# Patient Record
Sex: MALE | Race: OTHER | NOT HISPANIC OR LATINO | URBAN - METROPOLITAN AREA
[De-identification: names, ages, dates, MRNs, and addresses within clinical notes are randomized per-mention and may not be internally consistent; named-entity substitution may affect disease eponyms.]

---

## 2017-03-16 ENCOUNTER — INPATIENT (INPATIENT)
Facility: HOSPITAL | Age: 82
LOS: 0 days | Discharge: HOME | DRG: 153 | End: 2017-03-17
Attending: INTERNAL MEDICINE | Admitting: INTERNAL MEDICINE
Payer: COMMERCIAL

## 2017-03-16 VITALS
SYSTOLIC BLOOD PRESSURE: 168 MMHG | TEMPERATURE: 100 F | RESPIRATION RATE: 19 BRPM | HEART RATE: 70 BPM | DIASTOLIC BLOOD PRESSURE: 50 MMHG

## 2017-03-16 DIAGNOSIS — R63.8 OTHER SYMPTOMS AND SIGNS CONCERNING FOOD AND FLUID INTAKE: ICD-10-CM

## 2017-03-16 DIAGNOSIS — J10.1 INFLUENZA DUE TO OTHER IDENTIFIED INFLUENZA VIRUS WITH OTHER RESPIRATORY MANIFESTATIONS: ICD-10-CM

## 2017-03-16 DIAGNOSIS — E87.1 HYPO-OSMOLALITY AND HYPONATREMIA: ICD-10-CM

## 2017-03-16 DIAGNOSIS — R55 SYNCOPE AND COLLAPSE: ICD-10-CM

## 2017-03-16 DIAGNOSIS — D50.9 IRON DEFICIENCY ANEMIA, UNSPECIFIED: ICD-10-CM

## 2017-03-16 DIAGNOSIS — J11.1 INFLUENZA DUE TO UNIDENTIFIED INFLUENZA VIRUS WITH OTHER RESPIRATORY MANIFESTATIONS: ICD-10-CM

## 2017-03-16 DIAGNOSIS — I25.10 ATHEROSCLEROTIC HEART DISEASE OF NATIVE CORONARY ARTERY WITHOUT ANGINA PECTORIS: ICD-10-CM

## 2017-03-16 DIAGNOSIS — N17.9 ACUTE KIDNEY FAILURE, UNSPECIFIED: ICD-10-CM

## 2017-03-16 DIAGNOSIS — Z29.9 ENCOUNTER FOR PROPHYLACTIC MEASURES, UNSPECIFIED: ICD-10-CM

## 2017-03-16 DIAGNOSIS — I10 ESSENTIAL (PRIMARY) HYPERTENSION: ICD-10-CM

## 2017-03-16 LAB
ALBUMIN SERPL ELPH-MCNC: 3.7 G/DL — SIGNIFICANT CHANGE UP (ref 3.3–5)
ALP SERPL-CCNC: 83 U/L — SIGNIFICANT CHANGE UP (ref 40–120)
ALT FLD-CCNC: 14 U/L RC — SIGNIFICANT CHANGE UP (ref 10–45)
ANION GAP SERPL CALC-SCNC: 14 MMOL/L — SIGNIFICANT CHANGE UP (ref 5–17)
AST SERPL-CCNC: 26 U/L — SIGNIFICANT CHANGE UP (ref 10–40)
BASE EXCESS BLDV CALC-SCNC: -0.1 MMOL/L — SIGNIFICANT CHANGE UP (ref -2–2)
BASOPHILS # BLD AUTO: 0 K/UL — SIGNIFICANT CHANGE UP (ref 0–0.2)
BASOPHILS NFR BLD AUTO: 0.3 % — SIGNIFICANT CHANGE UP (ref 0–2)
BILIRUB SERPL-MCNC: 0.6 MG/DL — SIGNIFICANT CHANGE UP (ref 0.2–1.2)
BUN SERPL-MCNC: 26 MG/DL — HIGH (ref 7–23)
CA-I SERPL-SCNC: 1.12 MMOL/L — SIGNIFICANT CHANGE UP (ref 1.12–1.3)
CALCIUM SERPL-MCNC: 8.5 MG/DL — SIGNIFICANT CHANGE UP (ref 8.4–10.5)
CHLORIDE BLDV-SCNC: 96 MMOL/L — SIGNIFICANT CHANGE UP (ref 96–108)
CHLORIDE SERPL-SCNC: 91 MMOL/L — LOW (ref 96–108)
CK MB BLD-MCNC: 1.4 % — SIGNIFICANT CHANGE UP (ref 0–3.5)
CK MB CFR SERPL CALC: 2.3 NG/ML — SIGNIFICANT CHANGE UP (ref 0–6.7)
CK SERPL-CCNC: 163 U/L — SIGNIFICANT CHANGE UP (ref 30–200)
CO2 BLDV-SCNC: 25 MMOL/L — SIGNIFICANT CHANGE UP (ref 22–30)
CO2 SERPL-SCNC: 21 MMOL/L — LOW (ref 22–31)
CREAT SERPL-MCNC: 1.45 MG/DL — HIGH (ref 0.5–1.3)
DACRYOCYTES BLD QL SMEAR: SLIGHT — SIGNIFICANT CHANGE UP
ELLIPTOCYTES BLD QL SMEAR: SLIGHT — SIGNIFICANT CHANGE UP
EOSINOPHIL # BLD AUTO: 0.1 K/UL — SIGNIFICANT CHANGE UP (ref 0–0.5)
EOSINOPHIL NFR BLD AUTO: 1.3 % — SIGNIFICANT CHANGE UP (ref 0–6)
FLUBV RNA SPEC QL NAA+PROBE: DETECTED
GAS PNL BLDV: 126 MMOL/L — LOW (ref 136–145)
GAS PNL BLDV: SIGNIFICANT CHANGE UP
GLUCOSE BLDV-MCNC: 131 MG/DL — HIGH (ref 70–99)
GLUCOSE SERPL-MCNC: 132 MG/DL — HIGH (ref 70–99)
HCO3 BLDV-SCNC: 24 MMOL/L — SIGNIFICANT CHANGE UP (ref 21–29)
HCT VFR BLD CALC: 28.4 % — LOW (ref 39–50)
HCT VFR BLDA CALC: 28 % — LOW (ref 39–50)
HGB BLD CALC-MCNC: 9.1 G/DL — LOW (ref 13–17)
HGB BLD-MCNC: 9.5 G/DL — LOW (ref 13–17)
INR BLD: 1.25 RATIO — HIGH (ref 0.88–1.16)
LACTATE BLDV-MCNC: 1.6 MMOL/L — SIGNIFICANT CHANGE UP (ref 0.7–2)
LG PLATELETS BLD QL AUTO: SLIGHT — SIGNIFICANT CHANGE UP
LIDOCAIN IGE QN: 94 U/L — HIGH (ref 7–60)
LYMPHOCYTES # BLD AUTO: 0.6 K/UL — LOW (ref 1–3.3)
LYMPHOCYTES # BLD AUTO: 12 % — LOW (ref 13–44)
MCHC RBC-ENTMCNC: 21.6 PG — LOW (ref 27–34)
MCHC RBC-ENTMCNC: 33.6 GM/DL — SIGNIFICANT CHANGE UP (ref 32–36)
MCV RBC AUTO: 64.4 FL — LOW (ref 80–100)
MICROCYTES BLD QL: SIGNIFICANT CHANGE UP
MONOCYTES # BLD AUTO: 1 K/UL — HIGH (ref 0–0.9)
MONOCYTES NFR BLD AUTO: 20.1 % — HIGH (ref 2–14)
NEUTROPHILS # BLD AUTO: 3.4 K/UL — SIGNIFICANT CHANGE UP (ref 1.8–7.4)
NEUTROPHILS NFR BLD AUTO: 66.3 % — SIGNIFICANT CHANGE UP (ref 43–77)
NT-PROBNP SERPL-SCNC: 3569 PG/ML — HIGH (ref 0–300)
OTHER CELLS CSF MANUAL: 5 ML/DL — LOW (ref 18–22)
OVALOCYTES BLD QL SMEAR: SLIGHT — SIGNIFICANT CHANGE UP
PCO2 BLDV: 37 MMHG — SIGNIFICANT CHANGE UP (ref 35–50)
PH BLDV: 7.42 — SIGNIFICANT CHANGE UP (ref 7.35–7.45)
PLAT MORPH BLD: NORMAL — SIGNIFICANT CHANGE UP
PLATELET # BLD AUTO: 108 K/UL — LOW (ref 150–400)
PO2 BLDV: 26 MMHG — SIGNIFICANT CHANGE UP (ref 25–45)
POTASSIUM BLDV-SCNC: 3.7 MMOL/L — SIGNIFICANT CHANGE UP (ref 3.5–5)
POTASSIUM SERPL-MCNC: 3.9 MMOL/L — SIGNIFICANT CHANGE UP (ref 3.5–5.3)
POTASSIUM SERPL-SCNC: 3.9 MMOL/L — SIGNIFICANT CHANGE UP (ref 3.5–5.3)
PROT SERPL-MCNC: 7.8 G/DL — SIGNIFICANT CHANGE UP (ref 6–8.3)
PROTHROM AB SERPL-ACNC: 13.7 SEC — HIGH (ref 10–13.1)
RAPID RVP RESULT: DETECTED
RBC # BLD: 4.41 M/UL — SIGNIFICANT CHANGE UP (ref 4.2–5.8)
RBC # FLD: 14.7 % — HIGH (ref 10.3–14.5)
RBC BLD AUTO: ABNORMAL
SAO2 % BLDV: 39 % — LOW (ref 67–88)
SCHISTOCYTES BLD QL AUTO: SLIGHT — SIGNIFICANT CHANGE UP
SODIUM SERPL-SCNC: 126 MMOL/L — LOW (ref 135–145)
TARGETS BLD QL SMEAR: SLIGHT — SIGNIFICANT CHANGE UP
TROPONIN T SERPL-MCNC: <0.01 NG/ML — SIGNIFICANT CHANGE UP (ref 0–0.06)
WBC # BLD: 5.1 K/UL — SIGNIFICANT CHANGE UP (ref 3.8–10.5)
WBC # FLD AUTO: 5.1 K/UL — SIGNIFICANT CHANGE UP (ref 3.8–10.5)

## 2017-03-16 PROCEDURE — 93308 TTE F-UP OR LMTD: CPT | Mod: 26

## 2017-03-16 PROCEDURE — 99223 1ST HOSP IP/OBS HIGH 75: CPT

## 2017-03-16 PROCEDURE — 99285 EMERGENCY DEPT VISIT HI MDM: CPT

## 2017-03-16 PROCEDURE — 70450 CT HEAD/BRAIN W/O DYE: CPT | Mod: 26

## 2017-03-16 PROCEDURE — 71010: CPT | Mod: 26

## 2017-03-16 RX ORDER — SENNA PLUS 8.6 MG/1
2 TABLET ORAL AT BEDTIME
Qty: 0 | Refills: 0 | Status: DISCONTINUED | OUTPATIENT
Start: 2017-03-16 | End: 2017-03-17

## 2017-03-16 RX ORDER — METOPROLOL TARTRATE 50 MG
100 TABLET ORAL DAILY
Qty: 0 | Refills: 0 | Status: DISCONTINUED | OUTPATIENT
Start: 2017-03-16 | End: 2017-03-17

## 2017-03-16 RX ORDER — ASPIRIN/CALCIUM CARB/MAGNESIUM 324 MG
81 TABLET ORAL DAILY
Qty: 0 | Refills: 0 | Status: DISCONTINUED | OUTPATIENT
Start: 2017-03-16 | End: 2017-03-17

## 2017-03-16 RX ORDER — SODIUM CHLORIDE 9 MG/ML
1000 INJECTION INTRAMUSCULAR; INTRAVENOUS; SUBCUTANEOUS ONCE
Qty: 0 | Refills: 0 | Status: COMPLETED | OUTPATIENT
Start: 2017-03-16 | End: 2017-03-16

## 2017-03-16 RX ORDER — ACETAMINOPHEN 500 MG
1000 TABLET ORAL ONCE
Qty: 0 | Refills: 0 | Status: COMPLETED | OUTPATIENT
Start: 2017-03-16 | End: 2017-03-16

## 2017-03-16 RX ORDER — ACETAMINOPHEN 500 MG
650 TABLET ORAL EVERY 6 HOURS
Qty: 0 | Refills: 0 | Status: DISCONTINUED | OUTPATIENT
Start: 2017-03-16 | End: 2017-03-17

## 2017-03-16 RX ORDER — ENOXAPARIN SODIUM 100 MG/ML
40 INJECTION SUBCUTANEOUS EVERY 24 HOURS
Qty: 0 | Refills: 0 | Status: DISCONTINUED | OUTPATIENT
Start: 2017-03-16 | End: 2017-03-17

## 2017-03-16 RX ORDER — DOCUSATE SODIUM 100 MG
100 CAPSULE ORAL THREE TIMES A DAY
Qty: 0 | Refills: 0 | Status: DISCONTINUED | OUTPATIENT
Start: 2017-03-16 | End: 2017-03-17

## 2017-03-16 RX ORDER — SIMVASTATIN 20 MG/1
20 TABLET, FILM COATED ORAL AT BEDTIME
Qty: 0 | Refills: 0 | Status: DISCONTINUED | OUTPATIENT
Start: 2017-03-16 | End: 2017-03-17

## 2017-03-16 RX ORDER — SODIUM CHLORIDE 9 MG/ML
3 INJECTION INTRAMUSCULAR; INTRAVENOUS; SUBCUTANEOUS ONCE
Qty: 0 | Refills: 0 | Status: COMPLETED | OUTPATIENT
Start: 2017-03-16 | End: 2017-03-16

## 2017-03-16 RX ORDER — SODIUM CHLORIDE 9 MG/ML
1000 INJECTION INTRAMUSCULAR; INTRAVENOUS; SUBCUTANEOUS ONCE
Qty: 0 | Refills: 0 | Status: COMPLETED | OUTPATIENT
Start: 2017-03-16 | End: 2017-03-17

## 2017-03-16 RX ADMIN — Medication 30 MILLIGRAM(S): at 20:51

## 2017-03-16 RX ADMIN — Medication 400 MILLIGRAM(S): at 18:08

## 2017-03-16 RX ADMIN — SODIUM CHLORIDE 3 MILLILITER(S): 9 INJECTION INTRAMUSCULAR; INTRAVENOUS; SUBCUTANEOUS at 17:30

## 2017-03-16 NOTE — ED PROVIDER NOTE - PROGRESS NOTE DETAILS
Mireya PGY2: febrile in ED, rvp and cultures ordered, 500 bolus ordered, cardiac echo with b lines and equivocal IVC. will monitor. Attending Rios: flu positive, ct head wnl, pt wtihotu focal neuro deficitis

## 2017-03-16 NOTE — H&P ADULT. - PROBLEM SELECTOR PLAN 5
--hypovolemic hyponatremia  --patient s/p 1L NS in ED  --will give another liter overnight  --trend Na

## 2017-03-16 NOTE — ED ADULT NURSE NOTE - OBJECTIVE STATEMENT
83 year old male alert and oriented x 4 came to the ED accompanied by grandson for fall.  Patient was getting up to go to the bathroom in the middle of the night and said his head feels heavy and is unsure of what happened and said he ended up on the bathroom floor.  Patient states he felt dizzy and hit his head, but does not remember falling.  Patient reports feeling a heaviness in his head, but would not quantify the pain.  Patient denies; chest pain, shortness of breath, nausea, vomiting, blurry vision.  Patient l/s clear and equal b/l, S1, S2 heard, pupils 2mm equal and reactive to light, patient able to move all 4 extremities and pulses present in all 4 extremities. Patient's grandson reports that patient had an episode of incontinence a few hours after the incident.  Patient has +1 swelling in b/l ankles which is his baseline.  Patient placed on CM, IV line established in the left AC #20.  Safety maintained.

## 2017-03-16 NOTE — ED ADULT TRIAGE NOTE - CHIEF COMPLAINT QUOTE
py fell ~1600 while urinating, +head injury, +episode of incontinence, pt takes daily asa. pt now having AMS episodes.

## 2017-03-16 NOTE — H&P ADULT. - ASSESSMENT
This is a 79 year old gentleman with with history of CAD s/p CABG 2004, chronic anemia (on procrit q4-6w, last Hgb 10.3 one week PTA), HTN, HLD presenting with syncope and collapse, found to be influenza B positive

## 2017-03-16 NOTE — H&P ADULT. - HISTORY OF PRESENT ILLNESS
This is a 79 year old gentleman with with history of CAD s/p CABG, HTN, HLD presenting with syncope and collapse. Per patient and family, he got up in the middle of the night to urinate,  woke up on bathroom floor and went back to bed. He notes a "heavy head" over the past day. mild ecchymosis to left occiput with no pain in area. patient not on AC.     In ED, HR 60-70, /70, satting well on RA, initially afebrile but spiked to 102.5.  Labs notable for WBC 5.1 with normal diff, microcytic anemia to 9.5 with MCV 65, INR 1.25, Na 126, BUN 26, Cr 1.45, negative troponin, BNP 3600, lactate 1.6, RVP positive for influenza B.  CXR clear, no effusions, no edema.  Received oseltamavir, tylenol and 1L NS. This is a 79 year old gentleman with with history of CAD s/p CABG 2004, chronic anemia (on procrit q4-6w, last Hgb 10.3 one week PTA), HTN, HLD presenting with syncope and collapse.  Patient notes that he has had mild HA, non-productive cough Per patient and family, he got up in the middle of the night to urinate,  woke up on bathroom floor and went back to bed. He notes a "heavy head" over the past day. mild ecchymosis to left occiput with no pain in area. patient not on AC.     In ED, HR 60-70, /70, satting well on RA, initially afebrile but spiked to 102.5.  Labs notable for WBC 5.1 with normal diff, microcytic anemia to 9.5 with MCV 65, INR 1.25, Na 126, BUN 26, Cr 1.45, negative troponin, BNP 3600, lactate 1.6, RVP positive for influenza B.  CXR clear, no effusions, no edema.  Received oseltamavir, tylenol and 1L NS. This is a 79 year old gentleman with with history of CAD s/p CABG 2004, chronic anemia (on procrit q4-6w, last Hgb 10.3 one week PTA), HTN, HLD presenting with syncope and collapse.  Patient notes that he has had mild HA, non-productive cough, throat pain, rhinorrhea, subjective fevers, generalized aches and pains x 2 days.  Overnight PTA, patient went to urinate at 4 AM.  He notes that he felt dizzy upon rising from bed, then fell while urinating.  He denies loss of consciousness, though is unable to give details regarding fall.  He remembers being on the floor with blood around his head, then rising on his own and getting back into bed.  He denies chest pain, palpitations.  Denies incontinence, tongue bite, post-ictal confusion.  Stable 1 pillow orthopnea, denies weight gain, SOB, increased edema, per family TTE 1 year PTA showed diastolic failure. Patient notes frequent urination x years, was previously on BPH meds but found no difference in frequency and discontinued.  Notes that he drinks 5-6 glasses of water daily, has continued to do so over the past 2 days.  No sick contacts, no recent travel.      In ED, HR 60-70, /70, satting well on RA, initially afebrile but spiked to 102.5.  Labs notable for WBC 5.1 with normal diff, microcytic anemia to 9.5 with MCV 65, INR 1.25, Na 126, BUN 26, Cr 1.45, negative troponin, BNP 3600, lactate 1.6, RVP positive for influenza B.  CXR clear, no effusions, no edema.  CT head without bleed.  Received oseltamavir, tylenol and 1L NS.

## 2017-03-16 NOTE — H&P ADULT. - LAB RESULTS AND INTERPRETATION
Labs personally reviewed.   Labs notable for WBC 5.1 with normal diff, microcytic anemia to 9.5 with MCV 65, INR 1.25, Na 126, BUN 26, Cr 1.45, negative troponin, BNP 3600, lactate 1.6, RVP positive for influenza B.

## 2017-03-16 NOTE — ED PROCEDURE NOTE - PROCEDURE ADDITIONAL DETAILS
POCUS: b/l b lines, no sig pericaridal effusion, ivc neither flat nor plethoric, no pleural effusions

## 2017-03-16 NOTE — H&P ADULT. - PROBLEM SELECTOR PLAN 2
--continue oseltamivir 75  --continue fluids as above  --robitussin PRN for cough  --tylenol PRN for fevers

## 2017-03-16 NOTE — H&P ADULT. - PROBLEM SELECTOR PLAN 1
--suspect orthostatic 2/2 infection, poor POs vs. vasovagal in setting of micturation  --fall precautions  --patient s/p 1L NS, will plan another liter  --doubt cardiogenic, seizure --suspect orthostatic 2/2 infection, poor POs vs. vasovagal in setting of micturation  --fall precautions  --patient s/p 1L NS, will plan another liter  --doubt cardiogenic, seizure, stroke

## 2017-03-16 NOTE — ED PROVIDER NOTE - MEDICAL DECISION MAKING DETAILS
Attending Rios: 82 y/o male h/o cardiac disease presenting after syncopal event with dizziness and closed head trauma. upon arrival pt found to be febrile. pt did report increased weakness and chills last few days. no recent travel. on exam abd soft and nontender. no focal neurologic defiicts on exam making acute intracreanial process less likely however with closed head injury ct head ordered which was negative for acute pathology. with fever pt pan cultured. RVP positive for influenza which is likely causing his fever. pt given cautious hydration with concern for cardiac history. plan to admit

## 2017-03-16 NOTE — ED PROVIDER NOTE - PHYSICAL EXAMINATION
Mireya: A & O x 3, NAD, HEENT with ecchymosis on left posterior occiput with no pain on palpation, otherwise WNL and no facial asymmetry; lungs CTAB, heart with irreg rhythm without murmur; abdomen soft NTND; extremities with mild bilateral 1+ edema; skin with no rashes, neuro exam non focal with no motor or sensory deficits Mireya: A & O x 3, NAD, HEENT with ecchymosis on left posterior occiput with no pain on palpation, otherwise WNL and no facial asymmetry; lungs CTAB, heart with irreg rhythm without murmur; abdomen soft NTND; extremities with mild bilateral 1+ edema; skin with no rashes, neuro exam non focal with no motor or sensory deficits  Attending Gabriel: exam above

## 2017-03-16 NOTE — H&P ADULT. - PROBLEM SELECTOR PLAN 3
--suspect that this is chronic, may be 2/2 CKD  --should get collateral for this in AM  --trend CBC  --iron studies in AM

## 2017-03-16 NOTE — H&P ADULT. - RADIOLOGY RESULTS AND INTERPRETATION
Imaging personally reviewed.  CXR clear, no effusions, no edema.  CT head without bleed.  Received oseltamavir, tylenol and 1L NS.

## 2017-03-16 NOTE — ED PROVIDER NOTE - OBJECTIVE STATEMENT
83 year old, presenting after fall/syncope while urinating in the middle of the night and woke up on bathroom floor and went back to bead. patient complaining of "heavy head" over the past day.     PMD: yonny parham 83 year old, history of CAD with CABG in 2004, presenting after fall/syncope while urinating in the middle of the night and woke up on bathroom floor and went back to bed. patient complaining of "heavy head" over the past day. had numerous episodes of incontinence today but that is unchanged from baseline. mild ecchymosis to left occiput with no pain in area    PMD: yonny parham (HIP) 83 year old, history of CAD with CABG in 2004, presenting after fall/syncope while urinating in the middle of the night and woke up on bathroom floor and went back to bed. patient complaining of "heavy head" over the past day. had numerous episodes of incontinence today but that is unchanged from baseline. mild ecchymosis to left occiput with no pain in area. patient not on AC.     PMD: yonny parham (HIP)

## 2017-03-17 VITALS
SYSTOLIC BLOOD PRESSURE: 134 MMHG | RESPIRATION RATE: 18 BRPM | OXYGEN SATURATION: 98 % | TEMPERATURE: 99 F | DIASTOLIC BLOOD PRESSURE: 71 MMHG | HEART RATE: 80 BPM

## 2017-03-17 LAB
ANION GAP SERPL CALC-SCNC: 12 MMOL/L — SIGNIFICANT CHANGE UP (ref 5–17)
APPEARANCE UR: CLEAR — SIGNIFICANT CHANGE UP
BASOPHILS # BLD AUTO: 0.04 K/UL — SIGNIFICANT CHANGE UP (ref 0–0.2)
BASOPHILS NFR BLD AUTO: 1 % — SIGNIFICANT CHANGE UP (ref 0–2)
BILIRUB UR-MCNC: NEGATIVE — SIGNIFICANT CHANGE UP
BUN SERPL-MCNC: 27 MG/DL — HIGH (ref 7–23)
CALCIUM SERPL-MCNC: 8.1 MG/DL — LOW (ref 8.4–10.5)
CHLORIDE SERPL-SCNC: 96 MMOL/L — SIGNIFICANT CHANGE UP (ref 96–108)
CO2 SERPL-SCNC: 22 MMOL/L — SIGNIFICANT CHANGE UP (ref 22–31)
COLOR SPEC: YELLOW — SIGNIFICANT CHANGE UP
CREAT ?TM UR-MCNC: 101 MG/DL — SIGNIFICANT CHANGE UP
CREAT SERPL-MCNC: 1.42 MG/DL — HIGH (ref 0.5–1.3)
DIFF PNL FLD: NEGATIVE — SIGNIFICANT CHANGE UP
EOSINOPHIL # BLD AUTO: 0.04 K/UL — SIGNIFICANT CHANGE UP (ref 0–0.5)
EOSINOPHIL NFR BLD AUTO: 1 % — SIGNIFICANT CHANGE UP (ref 0–6)
EPI CELLS # UR: SIGNIFICANT CHANGE UP /HPF
FERRITIN SERPL-MCNC: 145 NG/ML — SIGNIFICANT CHANGE UP (ref 30–400)
GLUCOSE SERPL-MCNC: 109 MG/DL — HIGH (ref 70–99)
GLUCOSE UR QL: NEGATIVE — SIGNIFICANT CHANGE UP
HCT VFR BLD CALC: 27.3 % — LOW (ref 39–50)
HGB BLD-MCNC: 8.9 G/DL — LOW (ref 13–17)
IRON SATN MFR SERPL: 38 UG/DL — LOW (ref 45–165)
KETONES UR-MCNC: NEGATIVE — SIGNIFICANT CHANGE UP
LEUKOCYTE ESTERASE UR-ACNC: NEGATIVE — SIGNIFICANT CHANGE UP
LYMPHOCYTES # BLD AUTO: 0.56 K/UL — LOW (ref 1–3.3)
LYMPHOCYTES # BLD AUTO: 14 % — SIGNIFICANT CHANGE UP (ref 13–44)
MCHC RBC-ENTMCNC: 20.5 PG — LOW (ref 27–34)
MCHC RBC-ENTMCNC: 32.6 GM/DL — SIGNIFICANT CHANGE UP (ref 32–36)
MCV RBC AUTO: 62.8 FL — LOW (ref 80–100)
MONOCYTES # BLD AUTO: 0.77 K/UL — SIGNIFICANT CHANGE UP (ref 0–0.9)
MONOCYTES NFR BLD AUTO: 19 % — HIGH (ref 2–14)
NEUTROPHILS # BLD AUTO: 2.62 K/UL — SIGNIFICANT CHANGE UP (ref 1.8–7.4)
NEUTROPHILS NFR BLD AUTO: 65 % — SIGNIFICANT CHANGE UP (ref 43–77)
NITRITE UR-MCNC: NEGATIVE — SIGNIFICANT CHANGE UP
OSMOLALITY UR: 484 MOS/KG — SIGNIFICANT CHANGE UP (ref 50–1200)
PH UR: 6 — SIGNIFICANT CHANGE UP (ref 4.8–8)
PLATELET # BLD AUTO: 95 K/UL — LOW (ref 150–400)
POTASSIUM SERPL-MCNC: 4.4 MMOL/L — SIGNIFICANT CHANGE UP (ref 3.5–5.3)
POTASSIUM SERPL-SCNC: 4.4 MMOL/L — SIGNIFICANT CHANGE UP (ref 3.5–5.3)
PROT UR-MCNC: SIGNIFICANT CHANGE UP
RBC # BLD: 4.35 M/UL — SIGNIFICANT CHANGE UP (ref 4.2–5.8)
RBC # FLD: 15.8 % — HIGH (ref 10.3–14.5)
RBC CASTS # UR COMP ASSIST: SIGNIFICANT CHANGE UP /HPF (ref 0–2)
SODIUM SERPL-SCNC: 130 MMOL/L — LOW (ref 135–145)
SODIUM UR-SCNC: 31 MMOL/L — SIGNIFICANT CHANGE UP
SP GR SPEC: 1.02 — SIGNIFICANT CHANGE UP (ref 1.01–1.02)
UROBILINOGEN FLD QL: NEGATIVE — SIGNIFICANT CHANGE UP
WBC # BLD: 4.03 K/UL — SIGNIFICANT CHANGE UP (ref 3.8–10.5)
WBC # FLD AUTO: 4.03 K/UL — SIGNIFICANT CHANGE UP (ref 3.8–10.5)
WBC UR QL: SIGNIFICANT CHANGE UP /HPF (ref 0–5)

## 2017-03-17 PROCEDURE — 83605 ASSAY OF LACTIC ACID: CPT

## 2017-03-17 PROCEDURE — 82435 ASSAY OF BLOOD CHLORIDE: CPT

## 2017-03-17 PROCEDURE — 71045 X-RAY EXAM CHEST 1 VIEW: CPT

## 2017-03-17 PROCEDURE — 87040 BLOOD CULTURE FOR BACTERIA: CPT

## 2017-03-17 PROCEDURE — 85014 HEMATOCRIT: CPT

## 2017-03-17 PROCEDURE — 82947 ASSAY GLUCOSE BLOOD QUANT: CPT

## 2017-03-17 PROCEDURE — 84295 ASSAY OF SERUM SODIUM: CPT

## 2017-03-17 PROCEDURE — 80048 BASIC METABOLIC PNL TOTAL CA: CPT

## 2017-03-17 PROCEDURE — 82330 ASSAY OF CALCIUM: CPT

## 2017-03-17 PROCEDURE — 70450 CT HEAD/BRAIN W/O DYE: CPT

## 2017-03-17 PROCEDURE — 82553 CREATINE MB FRACTION: CPT

## 2017-03-17 PROCEDURE — 82803 BLOOD GASES ANY COMBINATION: CPT

## 2017-03-17 PROCEDURE — 99285 EMERGENCY DEPT VISIT HI MDM: CPT | Mod: 25

## 2017-03-17 PROCEDURE — 82550 ASSAY OF CK (CPK): CPT

## 2017-03-17 PROCEDURE — 96374 THER/PROPH/DIAG INJ IV PUSH: CPT

## 2017-03-17 PROCEDURE — 87581 M.PNEUMON DNA AMP PROBE: CPT

## 2017-03-17 PROCEDURE — 85027 COMPLETE CBC AUTOMATED: CPT

## 2017-03-17 PROCEDURE — 93005 ELECTROCARDIOGRAM TRACING: CPT

## 2017-03-17 PROCEDURE — 83880 ASSAY OF NATRIURETIC PEPTIDE: CPT

## 2017-03-17 PROCEDURE — 83690 ASSAY OF LIPASE: CPT

## 2017-03-17 PROCEDURE — 84132 ASSAY OF SERUM POTASSIUM: CPT

## 2017-03-17 PROCEDURE — 84484 ASSAY OF TROPONIN QUANT: CPT

## 2017-03-17 PROCEDURE — 84300 ASSAY OF URINE SODIUM: CPT

## 2017-03-17 PROCEDURE — 87798 DETECT AGENT NOS DNA AMP: CPT

## 2017-03-17 PROCEDURE — 81001 URINALYSIS AUTO W/SCOPE: CPT

## 2017-03-17 PROCEDURE — 82728 ASSAY OF FERRITIN: CPT

## 2017-03-17 PROCEDURE — 83935 ASSAY OF URINE OSMOLALITY: CPT

## 2017-03-17 PROCEDURE — 93308 TTE F-UP OR LMTD: CPT

## 2017-03-17 PROCEDURE — 82570 ASSAY OF URINE CREATININE: CPT

## 2017-03-17 PROCEDURE — 80053 COMPREHEN METABOLIC PANEL: CPT

## 2017-03-17 PROCEDURE — 85610 PROTHROMBIN TIME: CPT

## 2017-03-17 PROCEDURE — 87633 RESP VIRUS 12-25 TARGETS: CPT

## 2017-03-17 PROCEDURE — 87486 CHLMYD PNEUM DNA AMP PROBE: CPT

## 2017-03-17 PROCEDURE — 83540 ASSAY OF IRON: CPT

## 2017-03-17 RX ORDER — FLUTICASONE PROPIONATE 50 MCG
1 SPRAY, SUSPENSION NASAL
Qty: 1 | Refills: 0 | OUTPATIENT
Start: 2017-03-17 | End: 2017-03-31

## 2017-03-17 RX ORDER — SIMVASTATIN 20 MG/1
1 TABLET, FILM COATED ORAL
Qty: 0 | Refills: 0 | COMMUNITY
Start: 2017-03-17

## 2017-03-17 RX ORDER — SIMVASTATIN 20 MG/1
1 TABLET, FILM COATED ORAL
Qty: 0 | Refills: 0 | COMMUNITY

## 2017-03-17 RX ORDER — ACETAMINOPHEN 500 MG
1 TABLET ORAL
Qty: 0 | Refills: 0 | COMMUNITY

## 2017-03-17 RX ADMIN — Medication 100 MILLIGRAM(S): at 06:20

## 2017-03-17 RX ADMIN — Medication 650 MILLIGRAM(S): at 12:12

## 2017-03-17 RX ADMIN — ENOXAPARIN SODIUM 40 MILLIGRAM(S): 100 INJECTION SUBCUTANEOUS at 06:20

## 2017-03-17 RX ADMIN — SIMVASTATIN 20 MILLIGRAM(S): 20 TABLET, FILM COATED ORAL at 00:00

## 2017-03-17 RX ADMIN — Medication 30 MILLIGRAM(S): at 16:49

## 2017-03-17 RX ADMIN — SODIUM CHLORIDE 125 MILLILITER(S): 9 INJECTION INTRAMUSCULAR; INTRAVENOUS; SUBCUTANEOUS at 00:02

## 2017-03-17 RX ADMIN — Medication 100 MILLIGRAM(S): at 00:01

## 2017-03-17 RX ADMIN — Medication 81 MILLIGRAM(S): at 12:13

## 2017-03-17 RX ADMIN — Medication 100 MILLIGRAM(S): at 14:54

## 2017-03-17 RX ADMIN — Medication 30 MILLIGRAM(S): at 06:20

## 2017-03-17 NOTE — DISCHARGE NOTE ADULT - PATIENT PORTAL LINK FT
“You can access the [facility] Patient Portal, offered by [facility], by registering with the following website: [email or web address]”

## 2017-03-17 NOTE — DISCHARGE NOTE ADULT - PLAN OF CARE
Completion of therapy, outpatient management Outpatient management Improvement in serum sodium You had a respiratory viral panel which showed Influenza B positive. You were started on Tamiflu, which you should continue for 4 additional days. Please follow up with your PMD after discharge. Please continue to take aspirin, Toprol, and zocor as prescribed by your PMD. Please continue to take Hydrochlorothiazide and Metoprolol a prescribed by your PMD. Your serum sodium was reduced at admission and improved to 130 over the course of admission after you received intravenous fluids. Please follow up with your PMD about continued management.

## 2017-03-17 NOTE — DISCHARGE NOTE ADULT - MEDICATION SUMMARY - MEDICATIONS TO TAKE
I will START or STAY ON the medications listed below when I get home from the hospital:    aspirin 81 mg oral tablet  -- 1 tab(s) by mouth once a day (at bedtime)  -- Indication: For CAD (coronary artery disease)    Zocor 20 mg oral tablet  -- 1 tab(s) by mouth once a day (at bedtime)  -- Indication: For CAD (coronary artery disease)    Tamiflu 30 mg oral capsule  -- 1 cap(s) by mouth 2 times a day  -- Indication: For Influenza    metoprolol succinate 100 mg oral tablet, extended release  -- 1 tab(s) by mouth once a day  -- Indication: For CAD (coronary artery disease)    hydroCHLOROthiazide 25 mg oral tablet  -- 1 tab(s) by mouth once a day  -- Indication: For HTN (hypertension)    guaiFENesin 100 mg/5 mL oral liquid  -- 5 milliliter(s) by mouth every 6 hours, As needed, Cough  -- Indication: For Influenza    Flonase 50 mcg/inh nasal spray  -- 1 spray(s) into nose once a day  -- Indication: For Influenza

## 2017-03-17 NOTE — DISCHARGE NOTE ADULT - CARE PROVIDER_API CALL
Judy Hoang (PIPER), Internal Medicine  [address] [place] [id number]  Phone: [phone]  Fax: [phone]

## 2017-03-17 NOTE — DISCHARGE NOTE ADULT - HOSPITAL COURSE
79 year old gentleman with with history of CAD s/p CABG 2004, chronic anemia (on procrit q4-6w, last Hgb 10.3 one week PTA), HTN, HLD presenting with syncope and collapse, found to be influenza B positive.   He was started on Tamiflu in the hospital. He was treated with intravenous fluids and home medications were resumed.  Lab work showed a stable anemia and chronic kidney disease. Chest xray was clear of focal opacities. Limited echo was nonrevealing. CT head was negative for any acute event.   The patient improved symptomatically during this admission. He was discharged on flonase and with instructions to complete a course of Tamiflu. He was advised to follow up closely with his PMD after discharge.   His vital signs. blood cell counts, and chemistries were monitored during this admission. He was discharged in stable medical condition to home.

## 2017-03-17 NOTE — DISCHARGE NOTE ADULT - CONDITIONS AT DISCHARGE
Alert and oriented X 4. Skin color good warm and dry to touch. Respirations regular and unlabored, occasional nonproductive cough noted.  Denies pain or discomfort. Appetite good at meals. Occasional incontinence of urine noted. Ambulating to bathroom with standby assist. No distress noted at time of discharge.

## 2017-03-17 NOTE — DISCHARGE NOTE ADULT - CARE PLAN
Principal Discharge DX:	Influenza  Goal:	Completion of therapy, outpatient management  Instructions for follow-up, activity and diet:	You had a respiratory viral panel which showed Influenza B positive. You were started on Tamiflu, which you should continue for 4 additional days. Please follow up with your PMD after discharge.  Secondary Diagnosis:	CAD (coronary artery disease)  Goal:	Outpatient management  Instructions for follow-up, activity and diet:	Please continue to take aspirin, Toprol, and zocor as prescribed by your PMD.  Secondary Diagnosis:	HTN (hypertension)  Goal:	Outpatient management  Instructions for follow-up, activity and diet:	Please continue to take Hydrochlorothiazide and Metoprolol a prescribed by your PMD.  Secondary Diagnosis:	Hyponatremia  Goal:	Improvement in serum sodium  Instructions for follow-up, activity and diet:	Your serum sodium was reduced at admission and improved to 130 over the course of admission after you received intravenous fluids.  Secondary Diagnosis:	CKD (chronic kidney disease)  Goal:	Outpatient management  Instructions for follow-up, activity and diet:	Please follow up with your PMD about continued management.

## 2017-03-18 ENCOUNTER — INPATIENT (INPATIENT)
Facility: HOSPITAL | Age: 82
LOS: 7 days | Discharge: HOME HEALTH | End: 2017-03-26
Attending: HOSPITALIST | Admitting: HOSPITALIST
Payer: MEDICARE

## 2017-03-18 VITALS
RESPIRATION RATE: 18 BRPM | HEART RATE: 64 BPM | SYSTOLIC BLOOD PRESSURE: 138 MMHG | TEMPERATURE: 98 F | OXYGEN SATURATION: 100 % | DIASTOLIC BLOOD PRESSURE: 45 MMHG

## 2017-03-18 DIAGNOSIS — J10.1 INFLUENZA DUE TO OTHER IDENTIFIED INFLUENZA VIRUS WITH OTHER RESPIRATORY MANIFESTATIONS: ICD-10-CM

## 2017-03-18 DIAGNOSIS — I10 ESSENTIAL (PRIMARY) HYPERTENSION: ICD-10-CM

## 2017-03-18 DIAGNOSIS — R55 SYNCOPE AND COLLAPSE: ICD-10-CM

## 2017-03-18 DIAGNOSIS — E87.1 HYPO-OSMOLALITY AND HYPONATREMIA: ICD-10-CM

## 2017-03-18 DIAGNOSIS — I25.10 ATHEROSCLEROTIC HEART DISEASE OF NATIVE CORONARY ARTERY WITHOUT ANGINA PECTORIS: ICD-10-CM

## 2017-03-18 LAB
ALBUMIN SERPL ELPH-MCNC: 3.9 G/DL — SIGNIFICANT CHANGE UP (ref 3.3–5)
ALP SERPL-CCNC: 62 U/L — SIGNIFICANT CHANGE UP (ref 40–120)
ALT FLD-CCNC: 18 U/L — SIGNIFICANT CHANGE UP (ref 4–41)
ANISOCYTOSIS BLD QL: SLIGHT — SIGNIFICANT CHANGE UP
APPEARANCE UR: CLEAR — SIGNIFICANT CHANGE UP
AST SERPL-CCNC: 39 U/L — SIGNIFICANT CHANGE UP (ref 4–40)
BASE EXCESS BLDV CALC-SCNC: 0.1 MMOL/L — SIGNIFICANT CHANGE UP
BASOPHILS # BLD AUTO: 0.02 K/UL — SIGNIFICANT CHANGE UP (ref 0–0.2)
BASOPHILS NFR BLD AUTO: 0.4 % — SIGNIFICANT CHANGE UP (ref 0–2)
BASOPHILS NFR SPEC: 0 % — SIGNIFICANT CHANGE UP (ref 0–2)
BILIRUB SERPL-MCNC: 0.7 MG/DL — SIGNIFICANT CHANGE UP (ref 0.2–1.2)
BILIRUB UR-MCNC: NEGATIVE — SIGNIFICANT CHANGE UP
BLASTS # FLD: 0 % — SIGNIFICANT CHANGE UP (ref 0–0)
BLOOD GAS VENOUS - CREATININE: 1.32 MG/DL — HIGH (ref 0.5–1.3)
BLOOD UR QL VISUAL: HIGH
BUN SERPL-MCNC: 28 MG/DL — HIGH (ref 7–23)
CALCIUM SERPL-MCNC: 8.8 MG/DL — SIGNIFICANT CHANGE UP (ref 8.4–10.5)
CHLORIDE BLDV-SCNC: 100 MMOL/L — SIGNIFICANT CHANGE UP (ref 96–108)
CHLORIDE SERPL-SCNC: 93 MMOL/L — LOW (ref 98–107)
CK MB BLD-MCNC: 1.5 — SIGNIFICANT CHANGE UP (ref 0–2.5)
CK MB BLD-MCNC: 4.41 NG/ML — SIGNIFICANT CHANGE UP (ref 1–6.6)
CK SERPL-CCNC: 283 U/L — HIGH (ref 30–200)
CK SERPL-CCNC: 302 U/L — HIGH (ref 30–200)
CO2 SERPL-SCNC: 21 MMOL/L — LOW (ref 22–31)
COLOR SPEC: SIGNIFICANT CHANGE UP
CREAT SERPL-MCNC: 1.35 MG/DL — HIGH (ref 0.5–1.3)
EOSINOPHIL # BLD AUTO: 0.01 K/UL — SIGNIFICANT CHANGE UP (ref 0–0.5)
EOSINOPHIL NFR BLD AUTO: 0.2 % — SIGNIFICANT CHANGE UP (ref 0–6)
EOSINOPHIL NFR FLD: 0 % — SIGNIFICANT CHANGE UP (ref 0–6)
GAS PNL BLDV: 128 MMOL/L — LOW (ref 136–146)
GLUCOSE BLDV-MCNC: 123 — HIGH (ref 70–99)
GLUCOSE SERPL-MCNC: 122 MG/DL — HIGH (ref 70–99)
GLUCOSE UR-MCNC: NEGATIVE — SIGNIFICANT CHANGE UP
HCO3 BLDV-SCNC: 23 MMOL/L — SIGNIFICANT CHANGE UP (ref 20–27)
HCT VFR BLD CALC: 29.8 % — LOW (ref 39–50)
HCT VFR BLDV CALC: 30.9 % — LOW (ref 39–51)
HGB BLD-MCNC: 10 G/DL — LOW (ref 13–17)
HGB BLDV-MCNC: 10 G/DL — LOW (ref 13–17)
HYPOCHROMIA BLD QL: SLIGHT — SIGNIFICANT CHANGE UP
IMM GRANULOCYTES NFR BLD AUTO: 0.4 % — SIGNIFICANT CHANGE UP (ref 0–1.5)
KETONES UR-MCNC: NEGATIVE — SIGNIFICANT CHANGE UP
LACTATE BLDV-MCNC: 3 MMOL/L — HIGH (ref 0.5–2)
LEUKOCYTE ESTERASE UR-ACNC: NEGATIVE — SIGNIFICANT CHANGE UP
LYMPHOCYTES # BLD AUTO: 0.55 K/UL — LOW (ref 1–3.3)
LYMPHOCYTES # BLD AUTO: 11.8 % — LOW (ref 13–44)
LYMPHOCYTES NFR SPEC AUTO: 10.9 % — LOW (ref 13–44)
MCHC RBC-ENTMCNC: 20.9 PG — LOW (ref 27–34)
MCHC RBC-ENTMCNC: 33.6 % — SIGNIFICANT CHANGE UP (ref 32–36)
MCV RBC AUTO: 62.2 FL — LOW (ref 80–100)
METAMYELOCYTES # FLD: 0 % — SIGNIFICANT CHANGE UP (ref 0–1)
MICROCYTES BLD QL: SLIGHT — SIGNIFICANT CHANGE UP
MONOCYTES # BLD AUTO: 0.42 K/UL — SIGNIFICANT CHANGE UP (ref 0–0.9)
MONOCYTES NFR BLD AUTO: 9 % — SIGNIFICANT CHANGE UP (ref 2–14)
MONOCYTES NFR BLD: 10.9 % — HIGH (ref 2–9)
MUCOUS THREADS # UR AUTO: SIGNIFICANT CHANGE UP
MYELOCYTES NFR BLD: 0 % — SIGNIFICANT CHANGE UP (ref 0–0)
NEUTROPHIL AB SER-ACNC: 68.9 % — SIGNIFICANT CHANGE UP (ref 43–77)
NEUTROPHILS # BLD AUTO: 3.66 K/UL — SIGNIFICANT CHANGE UP (ref 1.8–7.4)
NEUTROPHILS NFR BLD AUTO: 78.2 % — HIGH (ref 43–77)
NEUTS BAND # BLD: 6.7 % — HIGH (ref 0–6)
NITRITE UR-MCNC: NEGATIVE — SIGNIFICANT CHANGE UP
OTHER - HEMATOLOGY %: 0 — SIGNIFICANT CHANGE UP
OVALOCYTES BLD QL SMEAR: SLIGHT — SIGNIFICANT CHANGE UP
PCO2 BLDV: 47 MMHG — SIGNIFICANT CHANGE UP (ref 41–51)
PH BLDV: 7.34 PH — SIGNIFICANT CHANGE UP (ref 7.32–7.43)
PH UR: 6.5 — SIGNIFICANT CHANGE UP (ref 4.6–8)
PLATELET # BLD AUTO: 104 K/UL — LOW (ref 150–400)
PLATELET COUNT - ESTIMATE: SIGNIFICANT CHANGE UP
PMV BLD: SIGNIFICANT CHANGE UP FL (ref 7–13)
PO2 BLDV: < 24 MMHG — LOW (ref 35–40)
POIKILOCYTOSIS BLD QL AUTO: SLIGHT — SIGNIFICANT CHANGE UP
POTASSIUM BLDV-SCNC: 4 MMOL/L — SIGNIFICANT CHANGE UP (ref 3.4–4.5)
POTASSIUM SERPL-MCNC: 4.3 MMOL/L — SIGNIFICANT CHANGE UP (ref 3.5–5.3)
POTASSIUM SERPL-SCNC: 4.3 MMOL/L — SIGNIFICANT CHANGE UP (ref 3.5–5.3)
PROMYELOCYTES # FLD: 0 % — SIGNIFICANT CHANGE UP (ref 0–0)
PROT SERPL-MCNC: 8.1 G/DL — SIGNIFICANT CHANGE UP (ref 6–8.3)
PROT UR-MCNC: 30 — SIGNIFICANT CHANGE UP
RBC # BLD: 4.79 M/UL — SIGNIFICANT CHANGE UP (ref 4.2–5.8)
RBC # FLD: 15.7 % — HIGH (ref 10.3–14.5)
RBC CASTS # UR COMP ASSIST: SIGNIFICANT CHANGE UP (ref 0–?)
SAO2 % BLDV: 18.4 % — LOW (ref 60–85)
SODIUM SERPL-SCNC: 130 MMOL/L — LOW (ref 135–145)
SP GR SPEC: 1.01 — SIGNIFICANT CHANGE UP (ref 1–1.03)
SQUAMOUS # UR AUTO: SIGNIFICANT CHANGE UP
TROPONIN T SERPL-MCNC: < 0.06 NG/ML — SIGNIFICANT CHANGE UP (ref 0–0.06)
TROPONIN T SERPL-MCNC: < 0.06 NG/ML — SIGNIFICANT CHANGE UP (ref 0–0.06)
UROBILINOGEN FLD QL: NORMAL E.U. — SIGNIFICANT CHANGE UP (ref 0.1–0.2)
VARIANT LYMPHS # BLD: 2.5 % — SIGNIFICANT CHANGE UP
WBC # BLD: 4.68 K/UL — SIGNIFICANT CHANGE UP (ref 3.8–10.5)
WBC # FLD AUTO: 4.68 K/UL — SIGNIFICANT CHANGE UP (ref 3.8–10.5)

## 2017-03-18 PROCEDURE — 70450 CT HEAD/BRAIN W/O DYE: CPT | Mod: 26

## 2017-03-18 PROCEDURE — 71010: CPT | Mod: 26

## 2017-03-18 RX ORDER — SODIUM CHLORIDE 9 MG/ML
1000 INJECTION INTRAMUSCULAR; INTRAVENOUS; SUBCUTANEOUS ONCE
Qty: 0 | Refills: 0 | Status: COMPLETED | OUTPATIENT
Start: 2017-03-18 | End: 2017-03-18

## 2017-03-18 RX ORDER — FLUTICASONE PROPIONATE 50 MCG
1 SPRAY, SUSPENSION NASAL DAILY
Qty: 0 | Refills: 0 | Status: DISCONTINUED | OUTPATIENT
Start: 2017-03-18 | End: 2017-03-26

## 2017-03-18 RX ORDER — ACETAMINOPHEN 500 MG
650 TABLET ORAL ONCE
Qty: 0 | Refills: 0 | Status: COMPLETED | OUTPATIENT
Start: 2017-03-18 | End: 2017-03-18

## 2017-03-18 RX ORDER — SIMVASTATIN 20 MG/1
20 TABLET, FILM COATED ORAL AT BEDTIME
Qty: 0 | Refills: 0 | Status: DISCONTINUED | OUTPATIENT
Start: 2017-03-18 | End: 2017-03-26

## 2017-03-18 RX ORDER — TETANUS TOXOID, REDUCED DIPHTHERIA TOXOID AND ACELLULAR PERTUSSIS VACCINE, ADSORBED 5; 2.5; 8; 8; 2.5 [IU]/.5ML; [IU]/.5ML; UG/.5ML; UG/.5ML; UG/.5ML
0.5 SUSPENSION INTRAMUSCULAR ONCE
Qty: 0 | Refills: 0 | Status: COMPLETED | OUTPATIENT
Start: 2017-03-18 | End: 2017-03-18

## 2017-03-18 RX ORDER — SODIUM CHLORIDE 9 MG/ML
1000 INJECTION INTRAMUSCULAR; INTRAVENOUS; SUBCUTANEOUS ONCE
Qty: 0 | Refills: 0 | Status: DISCONTINUED | OUTPATIENT
Start: 2017-03-18 | End: 2017-03-18

## 2017-03-18 RX ORDER — METOPROLOL TARTRATE 50 MG
100 TABLET ORAL DAILY
Qty: 0 | Refills: 0 | Status: DISCONTINUED | OUTPATIENT
Start: 2017-03-18 | End: 2017-03-26

## 2017-03-18 RX ORDER — ACETAMINOPHEN 500 MG
650 TABLET ORAL EVERY 6 HOURS
Qty: 0 | Refills: 0 | Status: DISCONTINUED | OUTPATIENT
Start: 2017-03-18 | End: 2017-03-26

## 2017-03-18 RX ORDER — METOPROLOL TARTRATE 50 MG
50 TABLET ORAL ONCE
Qty: 0 | Refills: 0 | Status: COMPLETED | OUTPATIENT
Start: 2017-03-18 | End: 2017-03-18

## 2017-03-18 RX ORDER — TAMSULOSIN HYDROCHLORIDE 0.4 MG/1
0.4 CAPSULE ORAL AT BEDTIME
Qty: 0 | Refills: 0 | Status: DISCONTINUED | OUTPATIENT
Start: 2017-03-18 | End: 2017-03-26

## 2017-03-18 RX ORDER — METOPROLOL TARTRATE 50 MG
100 TABLET ORAL ONCE
Qty: 0 | Refills: 0 | Status: DISCONTINUED | OUTPATIENT
Start: 2017-03-18 | End: 2017-03-18

## 2017-03-18 RX ORDER — HEPARIN SODIUM 5000 [USP'U]/ML
5000 INJECTION INTRAVENOUS; SUBCUTANEOUS EVERY 8 HOURS
Qty: 0 | Refills: 0 | Status: DISCONTINUED | OUTPATIENT
Start: 2017-03-18 | End: 2017-03-26

## 2017-03-18 RX ORDER — ASPIRIN/CALCIUM CARB/MAGNESIUM 324 MG
81 TABLET ORAL DAILY
Qty: 0 | Refills: 0 | Status: DISCONTINUED | OUTPATIENT
Start: 2017-03-18 | End: 2017-03-26

## 2017-03-18 RX ORDER — SODIUM CHLORIDE 9 MG/ML
1000 INJECTION INTRAMUSCULAR; INTRAVENOUS; SUBCUTANEOUS
Qty: 0 | Refills: 0 | Status: DISCONTINUED | OUTPATIENT
Start: 2017-03-18 | End: 2017-03-20

## 2017-03-18 RX ADMIN — SODIUM CHLORIDE 75 MILLILITER(S): 9 INJECTION INTRAMUSCULAR; INTRAVENOUS; SUBCUTANEOUS at 19:15

## 2017-03-18 RX ADMIN — TETANUS TOXOID, REDUCED DIPHTHERIA TOXOID AND ACELLULAR PERTUSSIS VACCINE, ADSORBED 0.5 MILLILITER(S): 5; 2.5; 8; 8; 2.5 SUSPENSION INTRAMUSCULAR at 12:18

## 2017-03-18 RX ADMIN — Medication 650 MILLIGRAM(S): at 12:18

## 2017-03-18 RX ADMIN — SIMVASTATIN 20 MILLIGRAM(S): 20 TABLET, FILM COATED ORAL at 23:06

## 2017-03-18 RX ADMIN — SODIUM CHLORIDE 500 MILLILITER(S): 9 INJECTION INTRAMUSCULAR; INTRAVENOUS; SUBCUTANEOUS at 12:18

## 2017-03-18 RX ADMIN — Medication 50 MILLIGRAM(S): at 12:20

## 2017-03-18 RX ADMIN — TAMSULOSIN HYDROCHLORIDE 0.4 MILLIGRAM(S): 0.4 CAPSULE ORAL at 23:06

## 2017-03-18 RX ADMIN — Medication 650 MILLIGRAM(S): at 23:06

## 2017-03-18 RX ADMIN — Medication 30 MILLIGRAM(S): at 12:18

## 2017-03-18 RX ADMIN — Medication 650 MILLIGRAM(S): at 12:42

## 2017-03-18 RX ADMIN — HEPARIN SODIUM 5000 UNIT(S): 5000 INJECTION INTRAVENOUS; SUBCUTANEOUS at 23:06

## 2017-03-18 NOTE — H&P ADULT. - RS GEN PE MLT RESP DETAILS PC
breath sounds equal/airway patent/good air movement/clear to auscultation bilaterally/respirations non-labored

## 2017-03-18 NOTE — ED ADULT NURSE NOTE - CHIEF COMPLAINT QUOTE
pt brought in after falling in bathroom lac to occipital area blood noted thru dressing  no blood thinners other then asa/ pt has fallen two other times this week a nd seen at [facility]. dx as flu  denies cp or diaphoresis prior to falling. states got dizzy and fell after peeing  fs    dx flu na low 129 cat scan head thursday neg as per oren

## 2017-03-18 NOTE — ED ADULT TRIAGE NOTE - CHIEF COMPLAINT QUOTE
pt brought in after falling in bathroom lac to occiptal area no bleeding no blood thinners other than asa/ pt has fallen two other times this weeka nd seen at [facility]. dx as flu  denies cp or diarphoresis prior to falling. states got dizzy and fell after peeing pt brought in after falling in bathroom lac to occipital area blood noted thru dressing  no blood thinners other then asa/ pt has fallen two other times this week a nd seen at [facility]. dx as flu  denies cp or diaphoresis prior to falling. states got dizzy and fell after peeing pt brought in after falling in bathroom lac to occipital area blood noted thru dressing  no blood thinners other then asa/ pt has fallen two other times this week a nd seen at [facility]. dx as flu  denies cp or diaphoresis prior to falling. states got dizzy and fell after peeing  fs    dx flu na low 129 cat scan head thursday neg as per oren

## 2017-03-18 NOTE — ED PROVIDER NOTE - MEDICAL DECISION MAKING DETAILS
83M with recent admission for flu and hyponatremia, now with recurrent falls from home, possible syncope though denies LOC. Will need assessment for electrolyte abnormality, intracranial bleed given fall with hitting head and laceration, infection evaluation, cardiac evaluation, readmission.

## 2017-03-18 NOTE — ED ADULT NURSE NOTE - OBJECTIVE STATEMENT
Pt is axox3; states Pt is axox3; states that he fell today when he was going to the bathroom to urinate. Pt denies feeling dizzy before falling'; he stated that he fell on the right posterior side of his skull where there is currently a laceration showing mild active bleeding. On the posterior left side of the pt's skull is a healing scab and ecchymosis from a fall from 2 days ago his daughter reports. Pt states he has no dizziness, CP or SOB. Pt states he has a hx of a bypass and takes aspirin daily for it. Left AC # 20 inserted and labs sent. Normal sinus on cardiac monitor. 2/10 pain on left hip per pt but reports he is comfortable right now. Will continue to monitor.

## 2017-03-18 NOTE — H&P ADULT. - HISTORY OF PRESENT ILLNESS
84 yo M seen yesterday at [facility] after falling ? syncopizing at home pt found to be influenza B+ and hyponatremic. Pt states he fell down again last night, does not recall what happened but remembers being on the floor. Denies feeling any dizziness, CP, SOB, diaphoresis, or palp's. Pt was noted to have head trauma, lac to occipital area. Pt does admit to fevers of 101 at home, chills, sorethroat, rhinorrhea, and dry cough for the last 2-3 days was started on Tamiflu at [facility]. Pt denies any prior h/o falls or LOC prior to this weeks events.

## 2017-03-18 NOTE — ED PROVIDER NOTE - OBJECTIVE STATEMENT
83M PMH HTN, HLD, CAD, MI presents after unwitnessed fall at home. Pt reports he went to the bathroom, urinated, then fell. Denies LOC but unsure how he fell. Pt then called for his family to help him get up, when daughter turned around to put something down, pt fell again, again unsure how. This time pt hit his head, +large lac to posterior head. Denies LOC. No neck pain. No weakness. No NV. Pt d/c'd yesterday from [facility] after admission for fall with flu and hyponatremia. Prior to 2 days ago, pt did not have a hx of frequent falls, no hx of unsteadiness or difficulty ambulating. No further fever, cough, SOB or chest pain at home since discharge.

## 2017-03-18 NOTE — ED PROVIDER NOTE - ATTENDING CONTRIBUTION TO CARE
Dr. Smith:  I have personally performed a face to face bedside history and physical examination of this patient. I have discussed the history, examination, review of systems, assessment and plan of management with the resident. I have reviewed the electronic medical record and amended it to reflect my history, review of systems, physical exam, assessment and plan.    83M h/o CAD s/p discharge from hospital yesterday after a fall/syncope workup and found to have hyponatremia & influenza presents today after two falls at home in the setting of urination.  Per daughter, he does not have history of falls, only started a couple days ago.  Pt noted to have more unsteady gait over past couple days.  No other acute complaints.    Exam:  - NAD  - +U-shaped laceration to occiput  - PERRL  - RRR  - CTAB  - abd soft, NTND  - no focal neuro deficits, unsteady on feet when standing    A/P  - fall, head trauma, unsteady gait   - CT head  - tetanus vaccine  - cbc, cmp, trop  - urinalysis  - ekg  - admission

## 2017-03-19 LAB
ALBUMIN SERPL ELPH-MCNC: 2.9 G/DL — LOW (ref 3.3–5)
ALBUMIN SERPL ELPH-MCNC: 3.1 G/DL — LOW (ref 3.3–5)
ALP SERPL-CCNC: 49 U/L — SIGNIFICANT CHANGE UP (ref 40–120)
ALP SERPL-CCNC: 61 U/L — SIGNIFICANT CHANGE UP (ref 40–120)
ALT FLD-CCNC: 14 U/L — SIGNIFICANT CHANGE UP (ref 4–41)
ALT FLD-CCNC: 15 U/L — SIGNIFICANT CHANGE UP (ref 4–41)
AST SERPL-CCNC: 34 U/L — SIGNIFICANT CHANGE UP (ref 4–40)
AST SERPL-CCNC: 36 U/L — SIGNIFICANT CHANGE UP (ref 4–40)
B PERT DNA SPEC QL NAA+PROBE: SIGNIFICANT CHANGE UP
BASOPHILS # BLD AUTO: 0.01 K/UL — SIGNIFICANT CHANGE UP (ref 0–0.2)
BASOPHILS # BLD AUTO: 0.01 K/UL — SIGNIFICANT CHANGE UP (ref 0–0.2)
BASOPHILS NFR BLD AUTO: 0.3 % — SIGNIFICANT CHANGE UP (ref 0–2)
BASOPHILS NFR BLD AUTO: 0.3 % — SIGNIFICANT CHANGE UP (ref 0–2)
BILIRUB SERPL-MCNC: 0.6 MG/DL — SIGNIFICANT CHANGE UP (ref 0.2–1.2)
BILIRUB SERPL-MCNC: 0.6 MG/DL — SIGNIFICANT CHANGE UP (ref 0.2–1.2)
BUN SERPL-MCNC: 24 MG/DL — HIGH (ref 7–23)
BUN SERPL-MCNC: 24 MG/DL — HIGH (ref 7–23)
C PNEUM DNA SPEC QL NAA+PROBE: NOT DETECTED — SIGNIFICANT CHANGE UP
CALCIUM SERPL-MCNC: 7.7 MG/DL — LOW (ref 8.4–10.5)
CALCIUM SERPL-MCNC: 8.1 MG/DL — LOW (ref 8.4–10.5)
CHLORIDE SERPL-SCNC: 94 MMOL/L — LOW (ref 98–107)
CHLORIDE SERPL-SCNC: 96 MMOL/L — LOW (ref 98–107)
CHOLEST SERPL-MCNC: 106 MG/DL — LOW (ref 120–199)
CK SERPL-CCNC: 318 U/L — HIGH (ref 30–200)
CO2 SERPL-SCNC: 19 MMOL/L — LOW (ref 22–31)
CO2 SERPL-SCNC: 20 MMOL/L — LOW (ref 22–31)
CREAT SERPL-MCNC: 1.22 MG/DL — SIGNIFICANT CHANGE UP (ref 0.5–1.3)
CREAT SERPL-MCNC: 1.25 MG/DL — SIGNIFICANT CHANGE UP (ref 0.5–1.3)
EOSINOPHIL # BLD AUTO: 0.01 K/UL — SIGNIFICANT CHANGE UP (ref 0–0.5)
EOSINOPHIL # BLD AUTO: 0.01 K/UL — SIGNIFICANT CHANGE UP (ref 0–0.5)
EOSINOPHIL NFR BLD AUTO: 0.3 % — SIGNIFICANT CHANGE UP (ref 0–6)
EOSINOPHIL NFR BLD AUTO: 0.3 % — SIGNIFICANT CHANGE UP (ref 0–6)
FLUAV H1 2009 PAND RNA SPEC QL NAA+PROBE: NOT DETECTED — SIGNIFICANT CHANGE UP
FLUAV H1 RNA SPEC QL NAA+PROBE: NOT DETECTED — SIGNIFICANT CHANGE UP
FLUAV H3 RNA SPEC QL NAA+PROBE: NOT DETECTED — SIGNIFICANT CHANGE UP
FLUAV SUBTYP SPEC NAA+PROBE: SIGNIFICANT CHANGE UP
FLUBV RNA SPEC QL NAA+PROBE: POSITIVE — HIGH
GLUCOSE SERPL-MCNC: 111 MG/DL — HIGH (ref 70–99)
GLUCOSE SERPL-MCNC: 141 MG/DL — HIGH (ref 70–99)
HADV DNA SPEC QL NAA+PROBE: NOT DETECTED — SIGNIFICANT CHANGE UP
HCOV 229E RNA SPEC QL NAA+PROBE: NOT DETECTED — SIGNIFICANT CHANGE UP
HCOV HKU1 RNA SPEC QL NAA+PROBE: NOT DETECTED — SIGNIFICANT CHANGE UP
HCOV NL63 RNA SPEC QL NAA+PROBE: NOT DETECTED — SIGNIFICANT CHANGE UP
HCOV OC43 RNA SPEC QL NAA+PROBE: NOT DETECTED — SIGNIFICANT CHANGE UP
HCT VFR BLD CALC: 22.9 % — LOW (ref 39–50)
HCT VFR BLD CALC: 24.7 % — LOW (ref 39–50)
HDLC SERPL-MCNC: 34 MG/DL — LOW (ref 35–55)
HGB BLD-MCNC: 7.5 G/DL — LOW (ref 13–17)
HGB BLD-MCNC: 8.2 G/DL — LOW (ref 13–17)
HMPV RNA SPEC QL NAA+PROBE: NOT DETECTED — SIGNIFICANT CHANGE UP
HPIV1 RNA SPEC QL NAA+PROBE: NOT DETECTED — SIGNIFICANT CHANGE UP
HPIV2 RNA SPEC QL NAA+PROBE: NOT DETECTED — SIGNIFICANT CHANGE UP
HPIV3 RNA SPEC QL NAA+PROBE: NOT DETECTED — SIGNIFICANT CHANGE UP
HPIV4 RNA SPEC QL NAA+PROBE: NOT DETECTED — SIGNIFICANT CHANGE UP
IMM GRANULOCYTES NFR BLD AUTO: 0.3 % — SIGNIFICANT CHANGE UP (ref 0–1.5)
IMM GRANULOCYTES NFR BLD AUTO: 0.3 % — SIGNIFICANT CHANGE UP (ref 0–1.5)
LIPID PNL WITH DIRECT LDL SERPL: 51 MG/DL — SIGNIFICANT CHANGE UP
LYMPHOCYTES # BLD AUTO: 0.53 K/UL — LOW (ref 1–3.3)
LYMPHOCYTES # BLD AUTO: 0.73 K/UL — LOW (ref 1–3.3)
LYMPHOCYTES # BLD AUTO: 13.5 % — SIGNIFICANT CHANGE UP (ref 13–44)
LYMPHOCYTES # BLD AUTO: 21.5 % — SIGNIFICANT CHANGE UP (ref 13–44)
M PNEUMO DNA SPEC QL NAA+PROBE: NOT DETECTED — SIGNIFICANT CHANGE UP
MAGNESIUM SERPL-MCNC: 1.6 MG/DL — SIGNIFICANT CHANGE UP (ref 1.6–2.6)
MCHC RBC-ENTMCNC: 20.1 PG — LOW (ref 27–34)
MCHC RBC-ENTMCNC: 20.5 PG — LOW (ref 27–34)
MCHC RBC-ENTMCNC: 32.8 % — SIGNIFICANT CHANGE UP (ref 32–36)
MCHC RBC-ENTMCNC: 33.2 % — SIGNIFICANT CHANGE UP (ref 32–36)
MCV RBC AUTO: 61.2 FL — LOW (ref 80–100)
MCV RBC AUTO: 61.8 FL — LOW (ref 80–100)
MONOCYTES # BLD AUTO: 0.32 K/UL — SIGNIFICANT CHANGE UP (ref 0–0.9)
MONOCYTES # BLD AUTO: 0.4 K/UL — SIGNIFICANT CHANGE UP (ref 0–0.9)
MONOCYTES NFR BLD AUTO: 11.8 % — SIGNIFICANT CHANGE UP (ref 2–14)
MONOCYTES NFR BLD AUTO: 8.2 % — SIGNIFICANT CHANGE UP (ref 2–14)
NEUTROPHILS # BLD AUTO: 2.24 K/UL — SIGNIFICANT CHANGE UP (ref 1.8–7.4)
NEUTROPHILS # BLD AUTO: 3.04 K/UL — SIGNIFICANT CHANGE UP (ref 1.8–7.4)
NEUTROPHILS NFR BLD AUTO: 65.8 % — SIGNIFICANT CHANGE UP (ref 43–77)
NEUTROPHILS NFR BLD AUTO: 77.4 % — HIGH (ref 43–77)
OB PNL STL: NEGATIVE — SIGNIFICANT CHANGE UP
OSMOLALITY SERPL: 267 MOSMO/KG — LOW (ref 275–295)
PHOSPHATE SERPL-MCNC: 2.5 MG/DL — SIGNIFICANT CHANGE UP (ref 2.5–4.5)
PLATELET # BLD AUTO: 84 K/UL — LOW (ref 150–400)
PLATELET # BLD AUTO: 92 K/UL — LOW (ref 150–400)
PMV BLD: SIGNIFICANT CHANGE UP FL (ref 7–13)
PMV BLD: SIGNIFICANT CHANGE UP FL (ref 7–13)
POTASSIUM SERPL-MCNC: 3.7 MMOL/L — SIGNIFICANT CHANGE UP (ref 3.5–5.3)
POTASSIUM SERPL-MCNC: 3.7 MMOL/L — SIGNIFICANT CHANGE UP (ref 3.5–5.3)
POTASSIUM SERPL-SCNC: 3.7 MMOL/L — SIGNIFICANT CHANGE UP (ref 3.5–5.3)
POTASSIUM SERPL-SCNC: 3.7 MMOL/L — SIGNIFICANT CHANGE UP (ref 3.5–5.3)
PROT SERPL-MCNC: 6.2 G/DL — SIGNIFICANT CHANGE UP (ref 6–8.3)
PROT SERPL-MCNC: 6.8 G/DL — SIGNIFICANT CHANGE UP (ref 6–8.3)
RBC # BLD: 3.74 M/UL — LOW (ref 4.2–5.8)
RBC # BLD: 4 M/UL — LOW (ref 4.2–5.8)
RBC # FLD: 15.6 % — HIGH (ref 10.3–14.5)
RBC # FLD: 15.6 % — HIGH (ref 10.3–14.5)
RSV RNA SPEC QL NAA+PROBE: NOT DETECTED — SIGNIFICANT CHANGE UP
RV+EV RNA SPEC QL NAA+PROBE: NOT DETECTED — SIGNIFICANT CHANGE UP
SODIUM SERPL-SCNC: 132 MMOL/L — LOW (ref 135–145)
SODIUM SERPL-SCNC: 134 MMOL/L — LOW (ref 135–145)
SPECIMEN SOURCE: SIGNIFICANT CHANGE UP
TRIGL SERPL-MCNC: 90 MG/DL — SIGNIFICANT CHANGE UP (ref 10–149)
TROPONIN T SERPL-MCNC: < 0.06 NG/ML — SIGNIFICANT CHANGE UP (ref 0–0.06)
TSH SERPL-MCNC: 3.58 UIU/ML — SIGNIFICANT CHANGE UP (ref 0.27–4.2)
WBC # BLD: 3.4 K/UL — LOW (ref 3.8–10.5)
WBC # BLD: 3.92 K/UL — SIGNIFICANT CHANGE UP (ref 3.8–10.5)
WBC # FLD AUTO: 3.4 K/UL — LOW (ref 3.8–10.5)
WBC # FLD AUTO: 3.92 K/UL — SIGNIFICANT CHANGE UP (ref 3.8–10.5)

## 2017-03-19 PROCEDURE — 99232 SBSQ HOSP IP/OBS MODERATE 35: CPT

## 2017-03-19 RX ADMIN — Medication 650 MILLIGRAM(S): at 17:19

## 2017-03-19 RX ADMIN — TAMSULOSIN HYDROCHLORIDE 0.4 MILLIGRAM(S): 0.4 CAPSULE ORAL at 22:56

## 2017-03-19 RX ADMIN — Medication 30 MILLIGRAM(S): at 17:19

## 2017-03-19 RX ADMIN — HEPARIN SODIUM 5000 UNIT(S): 5000 INJECTION INTRAVENOUS; SUBCUTANEOUS at 15:00

## 2017-03-19 RX ADMIN — SIMVASTATIN 20 MILLIGRAM(S): 20 TABLET, FILM COATED ORAL at 22:56

## 2017-03-19 RX ADMIN — HEPARIN SODIUM 5000 UNIT(S): 5000 INJECTION INTRAVENOUS; SUBCUTANEOUS at 22:55

## 2017-03-19 RX ADMIN — Medication 200 MILLIGRAM(S): at 11:14

## 2017-03-19 RX ADMIN — Medication 200 MILLIGRAM(S): at 22:55

## 2017-03-19 RX ADMIN — Medication 81 MILLIGRAM(S): at 11:14

## 2017-03-19 RX ADMIN — Medication 30 MILLIGRAM(S): at 06:36

## 2017-03-19 RX ADMIN — HEPARIN SODIUM 5000 UNIT(S): 5000 INJECTION INTRAVENOUS; SUBCUTANEOUS at 06:36

## 2017-03-19 RX ADMIN — Medication 100 MILLIGRAM(S): at 06:36

## 2017-03-19 NOTE — PROVIDER CONTACT NOTE (CRITICAL VALUE NOTIFICATION) - ASSESSMENT
A&Ox4, VSS, denies chest pain or SOB. 101.5 degrees rectal temp. Tele DANIE Russell made aware. Pt asymptomatic.

## 2017-03-20 LAB
-  AMIKACIN: SIGNIFICANT CHANGE UP
-  AMPICILLIN/SULBACTAM: SIGNIFICANT CHANGE UP
-  AMPICILLIN: SIGNIFICANT CHANGE UP
-  AZTREONAM: SIGNIFICANT CHANGE UP
-  CEFAZOLIN: SIGNIFICANT CHANGE UP
-  CEFEPIME: SIGNIFICANT CHANGE UP
-  CEFOXITIN: SIGNIFICANT CHANGE UP
-  CEFTAZIDIME: SIGNIFICANT CHANGE UP
-  CEFTRIAXONE: SIGNIFICANT CHANGE UP
-  CIPROFLOXACIN: SIGNIFICANT CHANGE UP
-  ERTAPENEM: SIGNIFICANT CHANGE UP
-  GENTAMICIN: SIGNIFICANT CHANGE UP
-  IMIPENEM: SIGNIFICANT CHANGE UP
-  LEVOFLOXACIN: SIGNIFICANT CHANGE UP
-  MEROPENEM: SIGNIFICANT CHANGE UP
-  NITROFURANTOIN: SIGNIFICANT CHANGE UP
-  PIPERACILLIN/TAZOBACTAM: SIGNIFICANT CHANGE UP
-  TIGECYCLINE: SIGNIFICANT CHANGE UP
-  TOBRAMYCIN: SIGNIFICANT CHANGE UP
-  TRIMETHOPRIM/SULFAMETHOXAZOLE: SIGNIFICANT CHANGE UP
BACTERIA UR CULT: SIGNIFICANT CHANGE UP
BASOPHILS # BLD AUTO: 0.03 K/UL — SIGNIFICANT CHANGE UP (ref 0–0.2)
BASOPHILS NFR BLD AUTO: 0.9 % — SIGNIFICANT CHANGE UP (ref 0–2)
BLD GP AB SCN SERPL QL: NEGATIVE — SIGNIFICANT CHANGE UP
BUN SERPL-MCNC: 21 MG/DL — SIGNIFICANT CHANGE UP (ref 7–23)
CALCIUM SERPL-MCNC: 8.2 MG/DL — LOW (ref 8.4–10.5)
CHLORIDE SERPL-SCNC: 99 MMOL/L — SIGNIFICANT CHANGE UP (ref 98–107)
CO2 SERPL-SCNC: 20 MMOL/L — LOW (ref 22–31)
CREAT SERPL-MCNC: 1.31 MG/DL — HIGH (ref 0.5–1.3)
EOSINOPHIL # BLD AUTO: 0.03 K/UL — SIGNIFICANT CHANGE UP (ref 0–0.5)
EOSINOPHIL NFR BLD AUTO: 0.9 % — SIGNIFICANT CHANGE UP (ref 0–6)
FERRITIN SERPL-MCNC: 236.2 NG/ML — SIGNIFICANT CHANGE UP (ref 30–400)
GLUCOSE SERPL-MCNC: 119 MG/DL — HIGH (ref 70–99)
HAPTOGLOB SERPL-MCNC: 173 MG/DL — SIGNIFICANT CHANGE UP (ref 34–200)
HCT VFR BLD CALC: 22.4 % — LOW (ref 39–50)
HGB BLD-MCNC: 7.5 G/DL — LOW (ref 13–17)
IMM GRANULOCYTES NFR BLD AUTO: 0.3 % — SIGNIFICANT CHANGE UP (ref 0–1.5)
IRON SATN MFR SERPL: 17 UG/DL — LOW (ref 45–165)
IRON SATN MFR SERPL: 191 UG/DL — SIGNIFICANT CHANGE UP (ref 155–535)
LDH SERPL L TO P-CCNC: 266 U/L — HIGH (ref 135–225)
LYMPHOCYTES # BLD AUTO: 0.85 K/UL — LOW (ref 1–3.3)
LYMPHOCYTES # BLD AUTO: 24.4 % — SIGNIFICANT CHANGE UP (ref 13–44)
MCHC RBC-ENTMCNC: 20.7 PG — LOW (ref 27–34)
MCHC RBC-ENTMCNC: 33.5 % — SIGNIFICANT CHANGE UP (ref 32–36)
MCV RBC AUTO: 61.7 FL — LOW (ref 80–100)
METHOD TYPE: SIGNIFICANT CHANGE UP
MONOCYTES # BLD AUTO: 0.48 K/UL — SIGNIFICANT CHANGE UP (ref 0–0.9)
MONOCYTES NFR BLD AUTO: 13.8 % — SIGNIFICANT CHANGE UP (ref 2–14)
NEUTROPHILS # BLD AUTO: 2.09 K/UL — SIGNIFICANT CHANGE UP (ref 1.8–7.4)
NEUTROPHILS NFR BLD AUTO: 59.7 % — SIGNIFICANT CHANGE UP (ref 43–77)
ORGANISM # SPEC MICROSCOPIC CNT: SIGNIFICANT CHANGE UP
ORGANISM # SPEC MICROSCOPIC CNT: SIGNIFICANT CHANGE UP
PLATELET # BLD AUTO: 93 K/UL — LOW (ref 150–400)
PMV BLD: SIGNIFICANT CHANGE UP FL (ref 7–13)
POTASSIUM SERPL-MCNC: 4.1 MMOL/L — SIGNIFICANT CHANGE UP (ref 3.5–5.3)
POTASSIUM SERPL-SCNC: 4.1 MMOL/L — SIGNIFICANT CHANGE UP (ref 3.5–5.3)
RBC # BLD: 3.63 M/UL — LOW (ref 4.2–5.8)
RBC # FLD: 15.8 % — HIGH (ref 10.3–14.5)
RH IG SCN BLD-IMP: POSITIVE — SIGNIFICANT CHANGE UP
RHEUMATOID FACT SERPL-ACNC: 8.7 IU/ML — SIGNIFICANT CHANGE UP
SODIUM SERPL-SCNC: 134 MMOL/L — LOW (ref 135–145)
SPECIMEN SOURCE: SIGNIFICANT CHANGE UP
SPECIMEN SOURCE: SIGNIFICANT CHANGE UP
UIBC SERPL-MCNC: 174 UG/DL — SIGNIFICANT CHANGE UP (ref 110–370)
WBC # BLD: 3.49 K/UL — LOW (ref 3.8–10.5)
WBC # FLD AUTO: 3.49 K/UL — LOW (ref 3.8–10.5)

## 2017-03-20 PROCEDURE — 93306 TTE W/DOPPLER COMPLETE: CPT | Mod: 26

## 2017-03-20 PROCEDURE — 71250 CT THORAX DX C-: CPT | Mod: 26

## 2017-03-20 PROCEDURE — 71010: CPT | Mod: 26

## 2017-03-20 RX ORDER — SODIUM CHLORIDE 9 MG/ML
1000 INJECTION INTRAMUSCULAR; INTRAVENOUS; SUBCUTANEOUS
Qty: 0 | Refills: 0 | Status: DISCONTINUED | OUTPATIENT
Start: 2017-03-20 | End: 2017-03-22

## 2017-03-20 RX ORDER — PIPERACILLIN AND TAZOBACTAM 4; .5 G/20ML; G/20ML
3.38 INJECTION, POWDER, LYOPHILIZED, FOR SOLUTION INTRAVENOUS ONCE
Qty: 0 | Refills: 0 | Status: COMPLETED | OUTPATIENT
Start: 2017-03-20 | End: 2017-03-20

## 2017-03-20 RX ORDER — VANCOMYCIN HCL 1 G
1000 VIAL (EA) INTRAVENOUS ONCE
Qty: 0 | Refills: 0 | Status: COMPLETED | OUTPATIENT
Start: 2017-03-20 | End: 2017-03-20

## 2017-03-20 RX ORDER — VANCOMYCIN HCL 1 G
1000 VIAL (EA) INTRAVENOUS EVERY 12 HOURS
Qty: 0 | Refills: 0 | Status: DISCONTINUED | OUTPATIENT
Start: 2017-03-20 | End: 2017-03-20

## 2017-03-20 RX ORDER — ERYTHROPOIETIN 10000 [IU]/ML
10000 INJECTION, SOLUTION INTRAVENOUS; SUBCUTANEOUS
Qty: 0 | Refills: 0 | Status: DISCONTINUED | OUTPATIENT
Start: 2017-03-20 | End: 2017-03-26

## 2017-03-20 RX ORDER — VANCOMYCIN HCL 1 G
VIAL (EA) INTRAVENOUS
Qty: 0 | Refills: 0 | Status: DISCONTINUED | OUTPATIENT
Start: 2017-03-20 | End: 2017-03-22

## 2017-03-20 RX ORDER — VANCOMYCIN HCL 1 G
1000 VIAL (EA) INTRAVENOUS EVERY 24 HOURS
Qty: 0 | Refills: 0 | Status: DISCONTINUED | OUTPATIENT
Start: 2017-03-21 | End: 2017-03-22

## 2017-03-20 RX ORDER — PIPERACILLIN AND TAZOBACTAM 4; .5 G/20ML; G/20ML
3.38 INJECTION, POWDER, LYOPHILIZED, FOR SOLUTION INTRAVENOUS EVERY 8 HOURS
Qty: 0 | Refills: 0 | Status: DISCONTINUED | OUTPATIENT
Start: 2017-03-20 | End: 2017-03-22

## 2017-03-20 RX ADMIN — Medication 250 MILLIGRAM(S): at 16:28

## 2017-03-20 RX ADMIN — Medication 100 MILLIGRAM(S): at 06:23

## 2017-03-20 RX ADMIN — Medication 650 MILLIGRAM(S): at 06:23

## 2017-03-20 RX ADMIN — HEPARIN SODIUM 5000 UNIT(S): 5000 INJECTION INTRAVENOUS; SUBCUTANEOUS at 13:55

## 2017-03-20 RX ADMIN — SODIUM CHLORIDE 75 MILLILITER(S): 9 INJECTION INTRAMUSCULAR; INTRAVENOUS; SUBCUTANEOUS at 20:08

## 2017-03-20 RX ADMIN — PIPERACILLIN AND TAZOBACTAM 25 GRAM(S): 4; .5 INJECTION, POWDER, LYOPHILIZED, FOR SOLUTION INTRAVENOUS at 21:40

## 2017-03-20 RX ADMIN — SIMVASTATIN 20 MILLIGRAM(S): 20 TABLET, FILM COATED ORAL at 21:41

## 2017-03-20 RX ADMIN — Medication 30 MILLIGRAM(S): at 06:23

## 2017-03-20 RX ADMIN — Medication 81 MILLIGRAM(S): at 13:55

## 2017-03-20 RX ADMIN — TAMSULOSIN HYDROCHLORIDE 0.4 MILLIGRAM(S): 0.4 CAPSULE ORAL at 21:41

## 2017-03-20 RX ADMIN — HEPARIN SODIUM 5000 UNIT(S): 5000 INJECTION INTRAVENOUS; SUBCUTANEOUS at 21:40

## 2017-03-20 RX ADMIN — HEPARIN SODIUM 5000 UNIT(S): 5000 INJECTION INTRAVENOUS; SUBCUTANEOUS at 06:23

## 2017-03-20 RX ADMIN — Medication 650 MILLIGRAM(S): at 20:07

## 2017-03-20 RX ADMIN — Medication 100 MILLIGRAM(S): at 20:07

## 2017-03-20 RX ADMIN — PIPERACILLIN AND TAZOBACTAM 200 GRAM(S): 4; .5 INJECTION, POWDER, LYOPHILIZED, FOR SOLUTION INTRAVENOUS at 18:16

## 2017-03-20 RX ADMIN — Medication 30 MILLIGRAM(S): at 16:28

## 2017-03-21 LAB
ANA TITR SER: NEGATIVE — SIGNIFICANT CHANGE UP
BUN SERPL-MCNC: 21 MG/DL — SIGNIFICANT CHANGE UP (ref 7–23)
CALCIUM SERPL-MCNC: 7.9 MG/DL — LOW (ref 8.4–10.5)
CHLORIDE SERPL-SCNC: 95 MMOL/L — LOW (ref 98–107)
CO2 SERPL-SCNC: 19 MMOL/L — LOW (ref 22–31)
CREAT SERPL-MCNC: 1.27 MG/DL — SIGNIFICANT CHANGE UP (ref 0.5–1.3)
CULTURE RESULTS: SIGNIFICANT CHANGE UP
CULTURE RESULTS: SIGNIFICANT CHANGE UP
GAS PNL BLDMV: SIGNIFICANT CHANGE UP
GAS PNL BLDMV: SIGNIFICANT CHANGE UP
GLUCOSE SERPL-MCNC: 121 MG/DL — HIGH (ref 70–99)
HAV IGM SER-ACNC: NONREACTIVE — SIGNIFICANT CHANGE UP
HBV CORE IGM SER-ACNC: NONREACTIVE — SIGNIFICANT CHANGE UP
HBV SURFACE AG SER-ACNC: NONREACTIVE — SIGNIFICANT CHANGE UP
HCT VFR BLD CALC: 23.2 % — LOW (ref 39–50)
HCV AB S/CO SERPL IA: 0.3 S/CO — SIGNIFICANT CHANGE UP
HCV AB SERPL-IMP: SIGNIFICANT CHANGE UP
HGB BLD-MCNC: 7.6 G/DL — LOW (ref 13–17)
IGA FLD-MCNC: 248 MG/DL — SIGNIFICANT CHANGE UP (ref 70–400)
IGG FLD-MCNC: 1478 MG/DL — SIGNIFICANT CHANGE UP (ref 700–1600)
IGM SERPL-MCNC: 38 MG/DL — LOW (ref 40–230)
KAPPA FREE LIGHT CHAINS, SERUM: 18.4 MG/DL — HIGH (ref 0.33–1.94)
L PNEUMO AG UR QL: NEGATIVE — SIGNIFICANT CHANGE UP
LAMBDA FREE LIGHT CHAINS, SERUM: 7.61 MG/DL — HIGH (ref 0.57–2.63)
MAGNESIUM SERPL-MCNC: 1.5 MG/DL — LOW (ref 1.6–2.6)
MCHC RBC-ENTMCNC: 20 PG — LOW (ref 27–34)
MCHC RBC-ENTMCNC: 32.8 % — SIGNIFICANT CHANGE UP (ref 32–36)
MCV RBC AUTO: 61.1 FL — LOW (ref 80–100)
PLATELET # BLD AUTO: 87 K/UL — LOW (ref 150–400)
PMV BLD: SIGNIFICANT CHANGE UP FL (ref 7–13)
POTASSIUM SERPL-MCNC: 3.9 MMOL/L — SIGNIFICANT CHANGE UP (ref 3.5–5.3)
POTASSIUM SERPL-SCNC: 3.9 MMOL/L — SIGNIFICANT CHANGE UP (ref 3.5–5.3)
RBC # BLD: 3.8 M/UL — LOW (ref 4.2–5.8)
RBC # FLD: 15.5 % — HIGH (ref 10.3–14.5)
RH IG SCN BLD-IMP: POSITIVE — SIGNIFICANT CHANGE UP
SODIUM SERPL-SCNC: 130 MMOL/L — LOW (ref 135–145)
SPECIMEN SOURCE: SIGNIFICANT CHANGE UP
SPECIMEN SOURCE: SIGNIFICANT CHANGE UP
WBC # BLD: 4.17 K/UL — SIGNIFICANT CHANGE UP (ref 3.8–10.5)
WBC # FLD AUTO: 4.17 K/UL — SIGNIFICANT CHANGE UP (ref 3.8–10.5)

## 2017-03-21 PROCEDURE — 86334 IMMUNOFIX E-PHORESIS SERUM: CPT | Mod: 26

## 2017-03-21 PROCEDURE — 86335 IMMUNFIX E-PHORSIS/URINE/CSF: CPT | Mod: 26

## 2017-03-21 RX ORDER — SODIUM CHLORIDE 9 MG/ML
1 INJECTION INTRAMUSCULAR; INTRAVENOUS; SUBCUTANEOUS
Qty: 0 | Refills: 0 | Status: DISCONTINUED | OUTPATIENT
Start: 2017-03-21 | End: 2017-03-26

## 2017-03-21 RX ORDER — MAGNESIUM SULFATE 500 MG/ML
1 VIAL (ML) INJECTION ONCE
Qty: 0 | Refills: 0 | Status: COMPLETED | OUTPATIENT
Start: 2017-03-21 | End: 2017-03-21

## 2017-03-21 RX ORDER — BUDESONIDE AND FORMOTEROL FUMARATE DIHYDRATE 160; 4.5 UG/1; UG/1
1 AEROSOL RESPIRATORY (INHALATION)
Qty: 0 | Refills: 0 | Status: DISCONTINUED | OUTPATIENT
Start: 2017-03-21 | End: 2017-03-21

## 2017-03-21 RX ORDER — BUDESONIDE AND FORMOTEROL FUMARATE DIHYDRATE 160; 4.5 UG/1; UG/1
2 AEROSOL RESPIRATORY (INHALATION)
Qty: 0 | Refills: 0 | Status: DISCONTINUED | OUTPATIENT
Start: 2017-03-21 | End: 2017-03-26

## 2017-03-21 RX ORDER — SODIUM CHLORIDE 9 MG/ML
500 INJECTION INTRAMUSCULAR; INTRAVENOUS; SUBCUTANEOUS ONCE
Qty: 0 | Refills: 0 | Status: COMPLETED | OUTPATIENT
Start: 2017-03-21 | End: 2017-03-21

## 2017-03-21 RX ORDER — DIPHENHYDRAMINE HCL 50 MG
25 CAPSULE ORAL ONCE
Qty: 0 | Refills: 0 | Status: COMPLETED | OUTPATIENT
Start: 2017-03-21 | End: 2017-03-21

## 2017-03-21 RX ADMIN — Medication 200 MILLIGRAM(S): at 21:58

## 2017-03-21 RX ADMIN — ERYTHROPOIETIN 10000 UNIT(S): 10000 INJECTION, SOLUTION INTRAVENOUS; SUBCUTANEOUS at 12:39

## 2017-03-21 RX ADMIN — PIPERACILLIN AND TAZOBACTAM 25 GRAM(S): 4; .5 INJECTION, POWDER, LYOPHILIZED, FOR SOLUTION INTRAVENOUS at 15:25

## 2017-03-21 RX ADMIN — Medication 650 MILLIGRAM(S): at 12:39

## 2017-03-21 RX ADMIN — SODIUM CHLORIDE 1000 MILLILITER(S): 9 INJECTION INTRAMUSCULAR; INTRAVENOUS; SUBCUTANEOUS at 06:10

## 2017-03-21 RX ADMIN — HEPARIN SODIUM 5000 UNIT(S): 5000 INJECTION INTRAVENOUS; SUBCUTANEOUS at 21:58

## 2017-03-21 RX ADMIN — Medication 81 MILLIGRAM(S): at 11:34

## 2017-03-21 RX ADMIN — Medication 250 MILLIGRAM(S): at 15:30

## 2017-03-21 RX ADMIN — SODIUM CHLORIDE 1 GRAM(S): 9 INJECTION INTRAMUSCULAR; INTRAVENOUS; SUBCUTANEOUS at 19:18

## 2017-03-21 RX ADMIN — PIPERACILLIN AND TAZOBACTAM 25 GRAM(S): 4; .5 INJECTION, POWDER, LYOPHILIZED, FOR SOLUTION INTRAVENOUS at 06:08

## 2017-03-21 RX ADMIN — Medication 100 MILLIGRAM(S): at 11:34

## 2017-03-21 RX ADMIN — BUDESONIDE AND FORMOTEROL FUMARATE DIHYDRATE 2 PUFF(S): 160; 4.5 AEROSOL RESPIRATORY (INHALATION) at 23:41

## 2017-03-21 RX ADMIN — Medication 100 MILLIGRAM(S): at 06:09

## 2017-03-21 RX ADMIN — Medication 25 MILLIGRAM(S): at 12:39

## 2017-03-21 RX ADMIN — Medication 100 GRAM(S): at 12:02

## 2017-03-21 RX ADMIN — SIMVASTATIN 20 MILLIGRAM(S): 20 TABLET, FILM COATED ORAL at 21:58

## 2017-03-21 RX ADMIN — HEPARIN SODIUM 5000 UNIT(S): 5000 INJECTION INTRAVENOUS; SUBCUTANEOUS at 15:25

## 2017-03-21 RX ADMIN — HEPARIN SODIUM 5000 UNIT(S): 5000 INJECTION INTRAVENOUS; SUBCUTANEOUS at 05:47

## 2017-03-21 RX ADMIN — TAMSULOSIN HYDROCHLORIDE 0.4 MILLIGRAM(S): 0.4 CAPSULE ORAL at 21:58

## 2017-03-21 RX ADMIN — Medication 30 MILLIGRAM(S): at 19:18

## 2017-03-21 RX ADMIN — Medication 30 MILLIGRAM(S): at 05:47

## 2017-03-21 RX ADMIN — PIPERACILLIN AND TAZOBACTAM 25 GRAM(S): 4; .5 INJECTION, POWDER, LYOPHILIZED, FOR SOLUTION INTRAVENOUS at 23:41

## 2017-03-22 LAB
ANISOCYTOSIS BLD QL: SLIGHT — SIGNIFICANT CHANGE UP
BASOPHILS # BLD AUTO: 0.02 K/UL — SIGNIFICANT CHANGE UP (ref 0–0.2)
BASOPHILS NFR BLD AUTO: 0.3 % — SIGNIFICANT CHANGE UP (ref 0–2)
BASOPHILS NFR SPEC: 0 % — SIGNIFICANT CHANGE UP (ref 0–2)
BILIRUB SERPL-MCNC: 1.2 MG/DL — SIGNIFICANT CHANGE UP (ref 0.2–1.2)
BLASTS # FLD: 0 % — SIGNIFICANT CHANGE UP (ref 0–0)
BUN SERPL-MCNC: 15 MG/DL — SIGNIFICANT CHANGE UP (ref 7–23)
CALCIUM SERPL-MCNC: 7.8 MG/DL — LOW (ref 8.4–10.5)
CHLORIDE SERPL-SCNC: 100 MMOL/L — SIGNIFICANT CHANGE UP (ref 98–107)
CO2 SERPL-SCNC: 19 MMOL/L — LOW (ref 22–31)
CREAT SERPL-MCNC: 1.21 MG/DL — SIGNIFICANT CHANGE UP (ref 0.5–1.3)
EOSINOPHIL # BLD AUTO: 0.17 K/UL — SIGNIFICANT CHANGE UP (ref 0–0.5)
EOSINOPHIL NFR BLD AUTO: 2.7 % — SIGNIFICANT CHANGE UP (ref 0–6)
EOSINOPHIL NFR FLD: 2.6 % — SIGNIFICANT CHANGE UP (ref 0–6)
GIANT PLATELETS BLD QL SMEAR: PRESENT — SIGNIFICANT CHANGE UP
GLUCOSE SERPL-MCNC: 116 MG/DL — HIGH (ref 70–99)
HAPTOGLOB SERPL-MCNC: 168 MG/DL — SIGNIFICANT CHANGE UP (ref 34–200)
HCT VFR BLD CALC: 22.4 % — LOW (ref 39–50)
HCT VFR BLD CALC: 23.7 % — LOW (ref 39–50)
HGB BLD-MCNC: 7.6 G/DL — LOW (ref 13–17)
HGB BLD-MCNC: 7.9 G/DL — LOW (ref 13–17)
HYPOCHROMIA BLD QL: SLIGHT — SIGNIFICANT CHANGE UP
IMM GRANULOCYTES NFR BLD AUTO: 1 % — SIGNIFICANT CHANGE UP (ref 0–1.5)
KAPPA/LAMBDA FREE LIGHT CHAIN RATIO, SERUM: SIGNIFICANT CHANGE UP
LDH SERPL L TO P-CCNC: 456 U/L — HIGH (ref 135–225)
LYMPHOCYTES # BLD AUTO: 1.18 K/UL — SIGNIFICANT CHANGE UP (ref 1–3.3)
LYMPHOCYTES # BLD AUTO: 18.8 % — SIGNIFICANT CHANGE UP (ref 13–44)
LYMPHOCYTES NFR SPEC AUTO: 10.4 % — LOW (ref 13–44)
MAGNESIUM SERPL-MCNC: 1.6 MG/DL — SIGNIFICANT CHANGE UP (ref 1.6–2.6)
MCHC RBC-ENTMCNC: 21.5 PG — LOW (ref 27–34)
MCHC RBC-ENTMCNC: 21.7 PG — LOW (ref 27–34)
MCHC RBC-ENTMCNC: 33.3 % — SIGNIFICANT CHANGE UP (ref 32–36)
MCHC RBC-ENTMCNC: 33.9 % — SIGNIFICANT CHANGE UP (ref 32–36)
MCV RBC AUTO: 63.8 FL — LOW (ref 80–100)
MCV RBC AUTO: 64.4 FL — LOW (ref 80–100)
METAMYELOCYTES # FLD: 0 % — SIGNIFICANT CHANGE UP (ref 0–1)
MICROCYTES BLD QL: SLIGHT — SIGNIFICANT CHANGE UP
MONOCYTES # BLD AUTO: 0.61 K/UL — SIGNIFICANT CHANGE UP (ref 0–0.9)
MONOCYTES NFR BLD AUTO: 9.7 % — SIGNIFICANT CHANGE UP (ref 2–14)
MONOCYTES NFR BLD: 7.8 % — SIGNIFICANT CHANGE UP (ref 2–9)
MYELOCYTES NFR BLD: 0 % — SIGNIFICANT CHANGE UP (ref 0–0)
NEUTROPHIL AB SER-ACNC: 77.4 % — HIGH (ref 43–77)
NEUTROPHILS # BLD AUTO: 4.24 K/UL — SIGNIFICANT CHANGE UP (ref 1.8–7.4)
NEUTROPHILS NFR BLD AUTO: 67.5 % — SIGNIFICANT CHANGE UP (ref 43–77)
NEUTS BAND # BLD: 1.7 % — SIGNIFICANT CHANGE UP (ref 0–6)
OTHER - HEMATOLOGY %: 0 — SIGNIFICANT CHANGE UP
OVALOCYTES BLD QL SMEAR: SIGNIFICANT CHANGE UP
PLATELET # BLD AUTO: 101 K/UL — LOW (ref 150–400)
PLATELET # BLD AUTO: 115 K/UL — LOW (ref 150–400)
PLATELET COUNT - ESTIMATE: SIGNIFICANT CHANGE UP
PMV BLD: 10.4 FL — SIGNIFICANT CHANGE UP (ref 7–13)
PMV BLD: 9.7 FL — SIGNIFICANT CHANGE UP (ref 7–13)
POIKILOCYTOSIS BLD QL AUTO: SIGNIFICANT CHANGE UP
POTASSIUM SERPL-MCNC: 4.3 MMOL/L — SIGNIFICANT CHANGE UP (ref 3.5–5.3)
POTASSIUM SERPL-SCNC: 4.3 MMOL/L — SIGNIFICANT CHANGE UP (ref 3.5–5.3)
PROMYELOCYTES # FLD: 0 % — SIGNIFICANT CHANGE UP (ref 0–0)
RBC # BLD: 3.51 M/UL — LOW (ref 4.2–5.8)
RBC # BLD: 3.68 M/UL — LOW (ref 4.2–5.8)
RBC # FLD: 17.4 % — HIGH (ref 10.3–14.5)
RBC # FLD: 17.7 % — HIGH (ref 10.3–14.5)
RETICS #: 44.5 10X3/UL — SIGNIFICANT CHANGE UP (ref 17–73)
RETICS/RBC NFR: 1.2 % — SIGNIFICANT CHANGE UP (ref 0.5–2.5)
SODIUM SERPL-SCNC: 132 MMOL/L — LOW (ref 135–145)
SPECIMEN SOURCE: SIGNIFICANT CHANGE UP
VARIANT LYMPHS # BLD: 0 % — SIGNIFICANT CHANGE UP
WBC # BLD: 6.28 K/UL — SIGNIFICANT CHANGE UP (ref 3.8–10.5)
WBC # BLD: 6.31 K/UL — SIGNIFICANT CHANGE UP (ref 3.8–10.5)
WBC # FLD AUTO: 6.28 K/UL — SIGNIFICANT CHANGE UP (ref 3.8–10.5)
WBC # FLD AUTO: 6.31 K/UL — SIGNIFICANT CHANGE UP (ref 3.8–10.5)

## 2017-03-22 PROCEDURE — 76770 US EXAM ABDO BACK WALL COMP: CPT | Mod: 26

## 2017-03-22 PROCEDURE — 76705 ECHO EXAM OF ABDOMEN: CPT | Mod: 26

## 2017-03-22 PROCEDURE — 76775 US EXAM ABDO BACK WALL LIM: CPT | Mod: 26

## 2017-03-22 RX ORDER — ALBUTEROL 90 UG/1
2.5 AEROSOL, METERED ORAL ONCE
Qty: 0 | Refills: 0 | Status: COMPLETED | OUTPATIENT
Start: 2017-03-22 | End: 2017-03-22

## 2017-03-22 RX ORDER — SODIUM CHLORIDE 0.65 %
1 AEROSOL, SPRAY (ML) NASAL THREE TIMES A DAY
Qty: 0 | Refills: 0 | Status: DISCONTINUED | OUTPATIENT
Start: 2017-03-22 | End: 2017-03-26

## 2017-03-22 RX ORDER — PETROLATUM,WHITE
1 JELLY (GRAM) TOPICAL DAILY
Qty: 0 | Refills: 0 | Status: DISCONTINUED | OUTPATIENT
Start: 2017-03-22 | End: 2017-03-26

## 2017-03-22 RX ORDER — SODIUM CHLORIDE 9 MG/ML
1000 INJECTION INTRAMUSCULAR; INTRAVENOUS; SUBCUTANEOUS
Qty: 0 | Refills: 0 | Status: DISCONTINUED | OUTPATIENT
Start: 2017-03-22 | End: 2017-03-23

## 2017-03-22 RX ADMIN — Medication 200 MILLIGRAM(S): at 05:28

## 2017-03-22 RX ADMIN — Medication 30 MILLIGRAM(S): at 18:44

## 2017-03-22 RX ADMIN — BUDESONIDE AND FORMOTEROL FUMARATE DIHYDRATE 2 PUFF(S): 160; 4.5 AEROSOL RESPIRATORY (INHALATION) at 21:49

## 2017-03-22 RX ADMIN — Medication 1 SPRAY(S): at 02:10

## 2017-03-22 RX ADMIN — Medication 200 MILLIGRAM(S): at 21:48

## 2017-03-22 RX ADMIN — SIMVASTATIN 20 MILLIGRAM(S): 20 TABLET, FILM COATED ORAL at 21:48

## 2017-03-22 RX ADMIN — HEPARIN SODIUM 5000 UNIT(S): 5000 INJECTION INTRAVENOUS; SUBCUTANEOUS at 05:27

## 2017-03-22 RX ADMIN — PIPERACILLIN AND TAZOBACTAM 25 GRAM(S): 4; .5 INJECTION, POWDER, LYOPHILIZED, FOR SOLUTION INTRAVENOUS at 05:27

## 2017-03-22 RX ADMIN — Medication 1 SPRAY(S): at 05:28

## 2017-03-22 RX ADMIN — HEPARIN SODIUM 5000 UNIT(S): 5000 INJECTION INTRAVENOUS; SUBCUTANEOUS at 21:48

## 2017-03-22 RX ADMIN — Medication 100 MILLIGRAM(S): at 05:27

## 2017-03-22 RX ADMIN — SODIUM CHLORIDE 50 MILLILITER(S): 9 INJECTION INTRAMUSCULAR; INTRAVENOUS; SUBCUTANEOUS at 13:35

## 2017-03-22 RX ADMIN — ALBUTEROL 2.5 MILLIGRAM(S): 90 AEROSOL, METERED ORAL at 17:33

## 2017-03-22 RX ADMIN — PIPERACILLIN AND TAZOBACTAM 25 GRAM(S): 4; .5 INJECTION, POWDER, LYOPHILIZED, FOR SOLUTION INTRAVENOUS at 13:34

## 2017-03-22 RX ADMIN — BUDESONIDE AND FORMOTEROL FUMARATE DIHYDRATE 2 PUFF(S): 160; 4.5 AEROSOL RESPIRATORY (INHALATION) at 10:53

## 2017-03-22 RX ADMIN — Medication 100 MILLIGRAM(S): at 10:53

## 2017-03-22 RX ADMIN — Medication 200 MILLIGRAM(S): at 13:35

## 2017-03-22 RX ADMIN — SODIUM CHLORIDE 75 MILLILITER(S): 9 INJECTION INTRAMUSCULAR; INTRAVENOUS; SUBCUTANEOUS at 05:28

## 2017-03-22 RX ADMIN — Medication 81 MILLIGRAM(S): at 13:34

## 2017-03-22 RX ADMIN — HEPARIN SODIUM 5000 UNIT(S): 5000 INJECTION INTRAVENOUS; SUBCUTANEOUS at 13:34

## 2017-03-22 RX ADMIN — Medication 30 MILLIGRAM(S): at 05:27

## 2017-03-22 RX ADMIN — Medication 1 SPRAY(S): at 21:48

## 2017-03-22 RX ADMIN — SODIUM CHLORIDE 1 GRAM(S): 9 INJECTION INTRAMUSCULAR; INTRAVENOUS; SUBCUTANEOUS at 10:05

## 2017-03-22 RX ADMIN — TAMSULOSIN HYDROCHLORIDE 0.4 MILLIGRAM(S): 0.4 CAPSULE ORAL at 21:48

## 2017-03-22 RX ADMIN — SODIUM CHLORIDE 1 GRAM(S): 9 INJECTION INTRAMUSCULAR; INTRAVENOUS; SUBCUTANEOUS at 18:44

## 2017-03-23 LAB
BASOPHILS # BLD AUTO: 0.02 K/UL — SIGNIFICANT CHANGE UP (ref 0–0.2)
BASOPHILS NFR BLD AUTO: 0.3 % — SIGNIFICANT CHANGE UP (ref 0–2)
BUN SERPL-MCNC: 12 MG/DL — SIGNIFICANT CHANGE UP (ref 7–23)
CALCIUM SERPL-MCNC: 8.3 MG/DL — LOW (ref 8.4–10.5)
CHLORIDE SERPL-SCNC: 100 MMOL/L — SIGNIFICANT CHANGE UP (ref 98–107)
CO2 SERPL-SCNC: 21 MMOL/L — LOW (ref 22–31)
CREAT SERPL-MCNC: 1.02 MG/DL — SIGNIFICANT CHANGE UP (ref 0.5–1.3)
DEPRECATED KAPPA LC FREE/LAMBDA SER: 12.67 — HIGH (ref 2.04–10.37)
EOSINOPHIL # BLD AUTO: 0.13 K/UL — SIGNIFICANT CHANGE UP (ref 0–0.5)
EOSINOPHIL # BLD AUTO: 0.16 K/UL — SIGNIFICANT CHANGE UP (ref 0–0.5)
EOSINOPHIL # BLD AUTO: 0.19 K/UL — SIGNIFICANT CHANGE UP (ref 0–0.5)
EOSINOPHIL NFR BLD AUTO: 1.9 % — SIGNIFICANT CHANGE UP (ref 0–6)
EOSINOPHIL NFR BLD AUTO: 2.6 % — SIGNIFICANT CHANGE UP (ref 0–6)
EOSINOPHIL NFR BLD AUTO: 2.9 % — SIGNIFICANT CHANGE UP (ref 0–6)
GLUCOSE SERPL-MCNC: 124 MG/DL — HIGH (ref 70–99)
HCT VFR BLD CALC: 20.6 % — CRITICAL LOW (ref 39–50)
HCT VFR BLD CALC: 21.2 % — LOW (ref 39–50)
HCT VFR BLD CALC: 26.4 % — LOW (ref 39–50)
HGB BLD-MCNC: 6.9 G/DL — CRITICAL LOW (ref 13–17)
HGB BLD-MCNC: 7.1 G/DL — LOW (ref 13–17)
HGB BLD-MCNC: 9 G/DL — LOW (ref 13–17)
IMM GRANULOCYTES NFR BLD AUTO: 1.3 % — SIGNIFICANT CHANGE UP (ref 0–1.5)
IMM GRANULOCYTES NFR BLD AUTO: 1.5 % — SIGNIFICANT CHANGE UP (ref 0–1.5)
IMM GRANULOCYTES NFR BLD AUTO: 2.1 % — HIGH (ref 0–1.5)
KAPPA LC UR-MCNC: 659 MG/L — HIGH (ref 1.35–24.19)
LAMBDA LC UR-MCNC: 52 MG/L — HIGH (ref 0.24–6.66)
LYMPHOCYTES # BLD AUTO: 1.05 K/UL — SIGNIFICANT CHANGE UP (ref 1–3.3)
LYMPHOCYTES # BLD AUTO: 1.05 K/UL — SIGNIFICANT CHANGE UP (ref 1–3.3)
LYMPHOCYTES # BLD AUTO: 1.11 K/UL — SIGNIFICANT CHANGE UP (ref 1–3.3)
LYMPHOCYTES # BLD AUTO: 15.4 % — SIGNIFICANT CHANGE UP (ref 13–44)
LYMPHOCYTES # BLD AUTO: 17 % — SIGNIFICANT CHANGE UP (ref 13–44)
LYMPHOCYTES # BLD AUTO: 17 % — SIGNIFICANT CHANGE UP (ref 13–44)
MAGNESIUM SERPL-MCNC: 1.5 MG/DL — LOW (ref 1.6–2.6)
MANUAL SMEAR VERIFICATION: SIGNIFICANT CHANGE UP
MANUAL SMEAR VERIFICATION: SIGNIFICANT CHANGE UP
MCHC RBC-ENTMCNC: 21.3 PG — LOW (ref 27–34)
MCHC RBC-ENTMCNC: 21.4 PG — LOW (ref 27–34)
MCHC RBC-ENTMCNC: 22.6 PG — LOW (ref 27–34)
MCHC RBC-ENTMCNC: 33.5 % — SIGNIFICANT CHANGE UP (ref 32–36)
MCHC RBC-ENTMCNC: 33.5 % — SIGNIFICANT CHANGE UP (ref 32–36)
MCHC RBC-ENTMCNC: 34.1 % — SIGNIFICANT CHANGE UP (ref 32–36)
MCV RBC AUTO: 63.5 FL — LOW (ref 80–100)
MCV RBC AUTO: 63.8 FL — LOW (ref 80–100)
MCV RBC AUTO: 66.2 FL — LOW (ref 80–100)
MONOCYTES # BLD AUTO: 0.49 K/UL — SIGNIFICANT CHANGE UP (ref 0–0.9)
MONOCYTES # BLD AUTO: 0.55 K/UL — SIGNIFICANT CHANGE UP (ref 0–0.9)
MONOCYTES # BLD AUTO: 0.7 K/UL — SIGNIFICANT CHANGE UP (ref 0–0.9)
MONOCYTES NFR BLD AUTO: 10.3 % — SIGNIFICANT CHANGE UP (ref 2–14)
MONOCYTES NFR BLD AUTO: 7.9 % — SIGNIFICANT CHANGE UP (ref 2–14)
MONOCYTES NFR BLD AUTO: 8.4 % — SIGNIFICANT CHANGE UP (ref 2–14)
NEUTROPHILS # BLD AUTO: 4.38 K/UL — SIGNIFICANT CHANGE UP (ref 1.8–7.4)
NEUTROPHILS # BLD AUTO: 4.53 K/UL — SIGNIFICANT CHANGE UP (ref 1.8–7.4)
NEUTROPHILS # BLD AUTO: 4.82 K/UL — SIGNIFICANT CHANGE UP (ref 1.8–7.4)
NEUTROPHILS NFR BLD AUTO: 69.3 % — SIGNIFICANT CHANGE UP (ref 43–77)
NEUTROPHILS NFR BLD AUTO: 70.7 % — SIGNIFICANT CHANGE UP (ref 43–77)
NEUTROPHILS NFR BLD AUTO: 70.8 % — SIGNIFICANT CHANGE UP (ref 43–77)
PLATELET # BLD AUTO: 112 K/UL — LOW (ref 150–400)
PLATELET # BLD AUTO: 118 K/UL — LOW (ref 150–400)
PLATELET # BLD AUTO: 133 K/UL — LOW (ref 150–400)
PMV BLD: 9.2 FL — SIGNIFICANT CHANGE UP (ref 7–13)
PMV BLD: 9.5 FL — SIGNIFICANT CHANGE UP (ref 7–13)
PMV BLD: 9.8 FL — SIGNIFICANT CHANGE UP (ref 7–13)
POTASSIUM SERPL-MCNC: 3.8 MMOL/L — SIGNIFICANT CHANGE UP (ref 3.5–5.3)
POTASSIUM SERPL-SCNC: 3.8 MMOL/L — SIGNIFICANT CHANGE UP (ref 3.5–5.3)
RBC # BLD: 3.23 M/UL — LOW (ref 4.2–5.8)
RBC # BLD: 3.34 M/UL — LOW (ref 4.2–5.8)
RBC # BLD: 3.99 M/UL — LOW (ref 4.2–5.8)
RBC # FLD: 17.6 % — HIGH (ref 10.3–14.5)
RBC # FLD: 17.8 % — HIGH (ref 10.3–14.5)
RBC # FLD: 19.6 % — HIGH (ref 10.3–14.5)
SODIUM SERPL-SCNC: 134 MMOL/L — LOW (ref 135–145)
WBC # BLD: 6.19 K/UL — SIGNIFICANT CHANGE UP (ref 3.8–10.5)
WBC # BLD: 6.54 K/UL — SIGNIFICANT CHANGE UP (ref 3.8–10.5)
WBC # BLD: 6.81 K/UL — SIGNIFICANT CHANGE UP (ref 3.8–10.5)
WBC # FLD AUTO: 6.19 K/UL — SIGNIFICANT CHANGE UP (ref 3.8–10.5)
WBC # FLD AUTO: 6.54 K/UL — SIGNIFICANT CHANGE UP (ref 3.8–10.5)
WBC # FLD AUTO: 6.81 K/UL — SIGNIFICANT CHANGE UP (ref 3.8–10.5)

## 2017-03-23 PROCEDURE — 74176 CT ABD & PELVIS W/O CONTRAST: CPT | Mod: 26

## 2017-03-23 RX ORDER — MAGNESIUM SULFATE 500 MG/ML
1 VIAL (ML) INJECTION ONCE
Qty: 0 | Refills: 0 | Status: COMPLETED | OUTPATIENT
Start: 2017-03-23 | End: 2017-03-23

## 2017-03-23 RX ORDER — PANTOPRAZOLE SODIUM 20 MG/1
40 TABLET, DELAYED RELEASE ORAL
Qty: 0 | Refills: 0 | Status: DISCONTINUED | OUTPATIENT
Start: 2017-03-23 | End: 2017-03-26

## 2017-03-23 RX ADMIN — Medication 30 MILLIGRAM(S): at 07:05

## 2017-03-23 RX ADMIN — Medication 100 GRAM(S): at 10:03

## 2017-03-23 RX ADMIN — Medication 650 MILLIGRAM(S): at 18:00

## 2017-03-23 RX ADMIN — SODIUM CHLORIDE 1 GRAM(S): 9 INJECTION INTRAMUSCULAR; INTRAVENOUS; SUBCUTANEOUS at 17:09

## 2017-03-23 RX ADMIN — BUDESONIDE AND FORMOTEROL FUMARATE DIHYDRATE 2 PUFF(S): 160; 4.5 AEROSOL RESPIRATORY (INHALATION) at 21:37

## 2017-03-23 RX ADMIN — SODIUM CHLORIDE 1 GRAM(S): 9 INJECTION INTRAMUSCULAR; INTRAVENOUS; SUBCUTANEOUS at 07:05

## 2017-03-23 RX ADMIN — Medication 200 MILLIGRAM(S): at 21:37

## 2017-03-23 RX ADMIN — Medication 81 MILLIGRAM(S): at 12:31

## 2017-03-23 RX ADMIN — HEPARIN SODIUM 5000 UNIT(S): 5000 INJECTION INTRAVENOUS; SUBCUTANEOUS at 21:37

## 2017-03-23 RX ADMIN — TAMSULOSIN HYDROCHLORIDE 0.4 MILLIGRAM(S): 0.4 CAPSULE ORAL at 21:37

## 2017-03-23 RX ADMIN — HEPARIN SODIUM 5000 UNIT(S): 5000 INJECTION INTRAVENOUS; SUBCUTANEOUS at 13:55

## 2017-03-23 RX ADMIN — BUDESONIDE AND FORMOTEROL FUMARATE DIHYDRATE 2 PUFF(S): 160; 4.5 AEROSOL RESPIRATORY (INHALATION) at 10:03

## 2017-03-23 RX ADMIN — Medication 100 MILLIGRAM(S): at 07:05

## 2017-03-23 RX ADMIN — HEPARIN SODIUM 5000 UNIT(S): 5000 INJECTION INTRAVENOUS; SUBCUTANEOUS at 07:05

## 2017-03-23 RX ADMIN — Medication 650 MILLIGRAM(S): at 17:10

## 2017-03-23 RX ADMIN — Medication 1 SPRAY(S): at 07:05

## 2017-03-23 RX ADMIN — Medication 200 MILLIGRAM(S): at 07:05

## 2017-03-23 RX ADMIN — Medication 1 APPLICATION(S): at 13:29

## 2017-03-23 RX ADMIN — SIMVASTATIN 20 MILLIGRAM(S): 20 TABLET, FILM COATED ORAL at 21:37

## 2017-03-24 LAB
BACTERIA BLD CULT: SIGNIFICANT CHANGE UP
BACTERIA BLD CULT: SIGNIFICANT CHANGE UP
BASOPHILS # BLD AUTO: 0.04 K/UL — SIGNIFICANT CHANGE UP (ref 0–0.2)
BASOPHILS NFR BLD AUTO: 0.5 % — SIGNIFICANT CHANGE UP (ref 0–2)
BUN SERPL-MCNC: 14 MG/DL — SIGNIFICANT CHANGE UP (ref 7–23)
CALCIUM SERPL-MCNC: 8.6 MG/DL — SIGNIFICANT CHANGE UP (ref 8.4–10.5)
CHLORIDE SERPL-SCNC: 99 MMOL/L — SIGNIFICANT CHANGE UP (ref 98–107)
CO2 SERPL-SCNC: 21 MMOL/L — LOW (ref 22–31)
CREAT SERPL-MCNC: 0.94 MG/DL — SIGNIFICANT CHANGE UP (ref 0.5–1.3)
CREAT UR-MCNC: 49 MG/DL — SIGNIFICANT CHANGE UP
EOSINOPHIL # BLD AUTO: 0.37 K/UL — SIGNIFICANT CHANGE UP (ref 0–0.5)
EOSINOPHIL NFR BLD AUTO: 4.9 % — SIGNIFICANT CHANGE UP (ref 0–6)
GLUCOSE SERPL-MCNC: 111 MG/DL — HIGH (ref 70–99)
HBA1C BLD-MCNC: 6.5 % — HIGH (ref 4–5.6)
HCT VFR BLD CALC: 22.6 % — LOW (ref 39–50)
HCT VFR BLD CALC: 24.1 % — LOW (ref 39–50)
HGB BLD-MCNC: 7.8 G/DL — LOW (ref 13–17)
HGB BLD-MCNC: 8.1 G/DL — LOW (ref 13–17)
IMM GRANULOCYTES NFR BLD AUTO: 2.5 % — HIGH (ref 0–1.5)
LYMPHOCYTES # BLD AUTO: 1.15 K/UL — SIGNIFICANT CHANGE UP (ref 1–3.3)
LYMPHOCYTES # BLD AUTO: 15.3 % — SIGNIFICANT CHANGE UP (ref 13–44)
MAGNESIUM SERPL-MCNC: 1.7 MG/DL — SIGNIFICANT CHANGE UP (ref 1.6–2.6)
MANUAL SMEAR VERIFICATION: SIGNIFICANT CHANGE UP
MCHC RBC-ENTMCNC: 22 PG — LOW (ref 27–34)
MCHC RBC-ENTMCNC: 22.9 PG — LOW (ref 27–34)
MCHC RBC-ENTMCNC: 33.6 % — SIGNIFICANT CHANGE UP (ref 32–36)
MCHC RBC-ENTMCNC: 34.5 % — SIGNIFICANT CHANGE UP (ref 32–36)
MCV RBC AUTO: 65.5 FL — LOW (ref 80–100)
MCV RBC AUTO: 66.3 FL — LOW (ref 80–100)
MICROALBUMIN UR-MCNC: 18.9 MG/DL — SIGNIFICANT CHANGE UP
MICROALBUMIN/CREAT UR-RTO: 386 UG/MG — HIGH (ref 0–30)
MONOCYTES # BLD AUTO: 0.81 K/UL — SIGNIFICANT CHANGE UP (ref 0–0.9)
MONOCYTES NFR BLD AUTO: 10.7 % — SIGNIFICANT CHANGE UP (ref 2–14)
NEUTROPHILS # BLD AUTO: 4.98 K/UL — SIGNIFICANT CHANGE UP (ref 1.8–7.4)
NEUTROPHILS NFR BLD AUTO: 66.1 % — SIGNIFICANT CHANGE UP (ref 43–77)
OB PNL STL: NEGATIVE — SIGNIFICANT CHANGE UP
PLATELET # BLD AUTO: 139 K/UL — LOW (ref 150–400)
PLATELET # BLD AUTO: 149 K/UL — LOW (ref 150–400)
PMV BLD: 9.4 FL — SIGNIFICANT CHANGE UP (ref 7–13)
PMV BLD: 9.6 FL — SIGNIFICANT CHANGE UP (ref 7–13)
POTASSIUM SERPL-MCNC: 4.5 MMOL/L — SIGNIFICANT CHANGE UP (ref 3.5–5.3)
POTASSIUM SERPL-SCNC: 4.5 MMOL/L — SIGNIFICANT CHANGE UP (ref 3.5–5.3)
RBC # BLD: 3.41 M/UL — LOW (ref 4.2–5.8)
RBC # BLD: 3.68 M/UL — LOW (ref 4.2–5.8)
RBC # FLD: 19.6 % — HIGH (ref 10.3–14.5)
RBC # FLD: 19.6 % — HIGH (ref 10.3–14.5)
SODIUM SERPL-SCNC: 136 MMOL/L — SIGNIFICANT CHANGE UP (ref 135–145)
WBC # BLD: 7.24 K/UL — SIGNIFICANT CHANGE UP (ref 3.8–10.5)
WBC # BLD: 7.54 K/UL — SIGNIFICANT CHANGE UP (ref 3.8–10.5)
WBC # FLD AUTO: 7.24 K/UL — SIGNIFICANT CHANGE UP (ref 3.8–10.5)
WBC # FLD AUTO: 7.54 K/UL — SIGNIFICANT CHANGE UP (ref 3.8–10.5)

## 2017-03-24 RX ADMIN — Medication 650 MILLIGRAM(S): at 18:22

## 2017-03-24 RX ADMIN — Medication 650 MILLIGRAM(S): at 17:35

## 2017-03-24 RX ADMIN — Medication 1 APPLICATION(S): at 12:35

## 2017-03-24 RX ADMIN — HEPARIN SODIUM 5000 UNIT(S): 5000 INJECTION INTRAVENOUS; SUBCUTANEOUS at 21:52

## 2017-03-24 RX ADMIN — BUDESONIDE AND FORMOTEROL FUMARATE DIHYDRATE 2 PUFF(S): 160; 4.5 AEROSOL RESPIRATORY (INHALATION) at 09:25

## 2017-03-24 RX ADMIN — Medication 100 MILLIGRAM(S): at 06:12

## 2017-03-24 RX ADMIN — SODIUM CHLORIDE 1 GRAM(S): 9 INJECTION INTRAMUSCULAR; INTRAVENOUS; SUBCUTANEOUS at 06:12

## 2017-03-24 RX ADMIN — Medication 200 MILLIGRAM(S): at 21:53

## 2017-03-24 RX ADMIN — SIMVASTATIN 20 MILLIGRAM(S): 20 TABLET, FILM COATED ORAL at 21:52

## 2017-03-24 RX ADMIN — HEPARIN SODIUM 5000 UNIT(S): 5000 INJECTION INTRAVENOUS; SUBCUTANEOUS at 12:35

## 2017-03-24 RX ADMIN — TAMSULOSIN HYDROCHLORIDE 0.4 MILLIGRAM(S): 0.4 CAPSULE ORAL at 21:52

## 2017-03-24 RX ADMIN — ERYTHROPOIETIN 10000 UNIT(S): 10000 INJECTION, SOLUTION INTRAVENOUS; SUBCUTANEOUS at 12:34

## 2017-03-24 RX ADMIN — Medication 81 MILLIGRAM(S): at 12:34

## 2017-03-24 RX ADMIN — BUDESONIDE AND FORMOTEROL FUMARATE DIHYDRATE 2 PUFF(S): 160; 4.5 AEROSOL RESPIRATORY (INHALATION) at 21:52

## 2017-03-24 RX ADMIN — PANTOPRAZOLE SODIUM 40 MILLIGRAM(S): 20 TABLET, DELAYED RELEASE ORAL at 06:12

## 2017-03-24 RX ADMIN — HEPARIN SODIUM 5000 UNIT(S): 5000 INJECTION INTRAVENOUS; SUBCUTANEOUS at 06:12

## 2017-03-24 NOTE — DISCHARGE NOTE ADULT - MEDICATION SUMMARY - MEDICATIONS TO TAKE
I will START or STAY ON the medications listed below when I get home from the hospital:    aspirin 81 mg oral tablet  -- 1 tab(s) by mouth once a day (at bedtime)  -- Indication: For CAD (coronary artery disease)    tamsulosin 0.4 mg oral capsule  -- 1 cap(s) by mouth once a day (at bedtime)  -- Indication: For BPH    Zocor 20 mg oral tablet  -- 1 tab(s) by mouth once a day (at bedtime)  -- Indication: For HLD    metoprolol succinate 100 mg oral tablet, extended release  -- 1 tab(s) by mouth once a day  -- Indication: For CAD (coronary artery disease)    budesonide-formoterol 160 mcg-4.5 mcg/inh inhalation aerosol  -- 2 puff(s) inhaled 2 times a day  -- Indication: For  pulmonary fibrosis     guaiFENesin 100 mg/5 mL oral liquid  -- 5 milliliter(s) by mouth every 6 hours, As needed, Cough  -- Indication: For Couph    ferrous sulfate 325 mg (65 mg elemental iron) oral tablet  -- 1 tab(s) by mouth 3 times a day  -- Indication: For anemia     sodium chloride 1 g oral tablet  -- 1 tab(s) by mouth 2 times a day (before meals)  -- Indication: For Hyponatremia    Flonase 50 mcg/inh nasal spray  -- 1 spray(s) into nose once a day  -- Indication: For nasal congestion     pantoprazole 40 mg oral delayed release tablet  -- 1 tab(s) by mouth once a day (before a meal)  -- Indication: For prophylaxis I will START or STAY ON the medications listed below when I get home from the hospital:    aspirin 81 mg oral tablet  -- 1 tab(s) by mouth once a day (at bedtime)  -- Indication: For CAD (coronary artery disease)    lisinopril 2.5 mg oral tablet  -- 1 tab(s) by mouth once a day  -- Indication: For HTN (hypertension)    tamsulosin 0.4 mg oral capsule  -- 1 cap(s) by mouth once a day (at bedtime)  -- Indication: For BPH    Zocor 20 mg oral tablet  -- 1 tab(s) by mouth once a day (at bedtime)  -- Indication: For HLD    metoprolol succinate 100 mg oral tablet, extended release  -- 1 tab(s) by mouth once a day  -- Indication: For CAD (coronary artery disease)    budesonide-formoterol 160 mcg-4.5 mcg/inh inhalation aerosol  -- 2 puff(s) inhaled 2 times a day  -- Indication: For Pneumonia    guaiFENesin 100 mg/5 mL oral liquid  -- 5 milliliter(s) by mouth every 6 hours, As needed, Cough  -- Indication: For Cough     ferrous sulfate 325 mg (65 mg elemental iron) oral tablet  -- 1 tab(s) by mouth 3 times a day  -- Indication: For anemia     sodium chloride 1 g oral tablet  -- 1 tab(s) by mouth 2 times a day (before meals)  -- Indication: For Hyponatremia    Flonase 50 mcg/inh nasal spray  -- 1 spray(s) into nose once a day  -- Indication: For nasal congestion     pantoprazole 40 mg oral delayed release tablet  -- 1 tab(s) by mouth once a day (before a meal)  -- Indication: For Prophylaxis I will START or STAY ON the medications listed below when I get home from the hospital:    Rolling walker, Felice R55, E87.1, J10.1  -- Indication: For Safety     aspirin 81 mg oral tablet  -- 1 tab(s) by mouth once a day (at bedtime)  -- Indication: For CAD (coronary artery disease)    lisinopril 2.5 mg oral tablet  -- 1 tab(s) by mouth once a day  -- Indication: For HTN (hypertension)    tamsulosin 0.4 mg oral capsule  -- 1 cap(s) by mouth once a day (at bedtime)  -- Indication: For BPH    Zocor 20 mg oral tablet  -- 1 tab(s) by mouth once a day (at bedtime)  -- Indication: For HLD    metoprolol succinate 100 mg oral tablet, extended release  -- 1 tab(s) by mouth once a day  -- Indication: For CAD (coronary artery disease)    budesonide-formoterol 160 mcg-4.5 mcg/inh inhalation aerosol  -- 2 puff(s) inhaled 2 times a day  -- Indication: For Pneumonia    guaiFENesin 100 mg/5 mL oral liquid  -- 5 milliliter(s) by mouth every 6 hours, As needed, Cough  -- Indication: For Cough     ferrous sulfate 325 mg (65 mg elemental iron) oral tablet  -- 1 tab(s) by mouth 3 times a day  -- Indication: For anemia     sodium chloride 1 g oral tablet  -- 1 tab(s) by mouth 2 times a day (before meals)  -- Indication: For Hyponatremia    Flonase 50 mcg/inh nasal spray  -- 1 spray(s) into nose once a day  -- Indication: For nasal congestion     pantoprazole 40 mg oral delayed release tablet  -- 1 tab(s) by mouth once a day (before a meal)  -- Indication: For Prophylaxis

## 2017-03-24 NOTE — DISCHARGE NOTE ADULT - CARE PLAN
Principal Discharge DX:	Influenza  Goal:	resolved  Instructions for follow-up, activity and diet:	Completed Tamiflu  Secondary Diagnosis:	Pneumonia  Goal:	resolved  Instructions for follow-up, activity and diet:	Completed IV antibiotics Zosyn/Vanco. CT showed  pulmonary fibrosis, continue Symbicort and follow up with PCP  Secondary Diagnosis:	Fall  Goal:	safety/fall precautions  Instructions for follow-up, activity and diet:	CT Head showed Left parietal scalp soft tissue laceration is demonstrated with increasing hyperdense soft tissue compared with the prior exam suggesting  an enlarging subgaleal hematoma. There is no evidence for acute intracranial hemorrhage or territorial infarct.  Secondary Diagnosis:	CAD (coronary artery disease)  Goal:	symptoms control  Instructions for follow-up, activity and diet:	Echo showed Left ventricular remodeling.  Normal left ventricular systolic function. moderate pulmonary hypertension.  Secondary Diagnosis:	HTN (hypertension)  Goal:	BP control  Instructions for follow-up, activity and diet:	hold hydrochlorothiazide for now and follow up with PCP in 1 week. Continue Metoprolol  Secondary Diagnosis:	Hyponatremia  Goal:	resolved  Instructions for follow-up, activity and diet:	Follow up with PCP in 1 week to repeat lab work (BMP/sodium level). Continue salt tablets and follow up with PCP in 1 week for further management  Secondary Diagnosis:	Anemia  Goal:	stable H/H  Instructions for follow-up, activity and diet:	s/p total of 2 units of PRBC transfusion, occult negative. Please call your PCP to refer to GI for outpatient follow up and evaluation for EGD/colonoscopy Principal Discharge DX:	Influenza  Goal:	resolved  Instructions for follow-up, activity and diet:	Completed Tamiflu  Secondary Diagnosis:	Pneumonia  Goal:	resolved  Instructions for follow-up, activity and diet:	Completed IV antibiotics Zosyn/Vanco. CT showed  pulmonary fibrosis, continue Symbicort and follow up with PCP  Secondary Diagnosis:	Fall  Goal:	safety/fall precautions  Instructions for follow-up, activity and diet:	CT Head showed Left parietal scalp soft tissue laceration is demonstrated with increasing hyperdense soft tissue compared with the prior exam suggesting  an enlarging subgaleal hematoma. There is no evidence for acute intracranial hemorrhage or territorial infarct.  DO NOT CHANGE POSITIONS QUICKLY FROM THE BED TO CHAIR OR FROM CHAIR TO STANDING  Secondary Diagnosis:	CAD (coronary artery disease)  Goal:	symptoms control  Instructions for follow-up, activity and diet:	Echo showed Left ventricular remodeling.  Normal left ventricular systolic function. moderate pulmonary hypertension.  Secondary Diagnosis:	HTN (hypertension)  Goal:	BP control  Instructions for follow-up, activity and diet:	hold hydrochlorothiazide for now and follow up with PCP in 1 week. Continue Metoprolol  Secondary Diagnosis:	Hyponatremia  Goal:	resolved  Instructions for follow-up, activity and diet:	Follow up with PCP in 1 week to repeat lab work (BMP/sodium level). Continue salt tablets and follow up with PCP in 1 week for further management  Secondary Diagnosis:	Anemia  Goal:	stable H/H  Instructions for follow-up, activity and diet:	s/p total of 3 units of PRBC transfusion, occult negative. Please call your PCP to refer to GI for outpatient follow up and evaluation for EGD/colonoscopy

## 2017-03-24 NOTE — DISCHARGE NOTE ADULT - HOSPITAL COURSE
82 yo M p/w Syncopal episode (third one), recently at [facility] for falls where he was found to be hyponatremic    + Influenza B s/p  Tamiflu   + Orthostatic s/p  IVF (Improving)   + hyponatremia--resolved   +Neutropenic fever /  S/P PNA on Zosyn/Vanco  + Had mild epistaxis on 3/21 overnight  + Anemia Likely Bone Marrow suppression due to  Viral/Vs Underlying suspected MDS,     3/23 s/p One more units of PRBC [total of 2 units of PRBC]  3/24- HGB 7.8- No transfusion tonight  3/24- rectal exam is negative  EKG- NSR @ 66 PAC's old RBBB, TWI v1-4, 1mm ST depression v2-4  CXR- clear lungs  CT Head- Left parietal scalp soft tissue laceration is demonstrated with increasing hyperdense soft tissue compared with the prior exam suggesting  an enlarging subgaleal hematoma. There is no evidence for acute intracranial hemorrhage or territorial infarct.  CE neg x1  Na 130  UA neg leuks/ nitrite, small blood  Influenza B +  ED Echo- No Pericardial Effusion.  Cardiology c/s in chart  RVP + Influenza B  CXR: clear lungs   3/20 repeat CXR:IMPRESSION:  Previous interstitial pulmonary edema is improved.Patchy opacity in both lower lungs, greater on the left which could be due to residual alveolar pulmonary edema, subsegmental atelectasis and/or pneumonia in the appropriate clinical context.  echo: 1. Increased relative wall thickness with normal left  ventricular mass index, consistent with concentric left  ventricular remodeling.2. Endocardium not well visualized; grossly normal leftventricular systolic function. Endocardial visualization  enhanced with intravenous injection of echo contrast  (Definity).3. Mild right atrial enlargement.4. The right ventricle is not well visualized. Unable toevaluate size or systolic function.5. Estimated pulmonary artery systolic pressure equals 52mm Hg, assuming right atrial pressure equals 10  mm Hg,consistent with moderate pulmonary hypertension.  Heme cs- Dr. Schwartz - following for pancytopenia, procrit & labs ordered     ID c/s Dr. Singh - Obtain a sputum culture, MRSA PCR, continue Tamiflu X days, add Vanco & Zosyn, Keep Vanco level Between 15-20,     3/20 CT chest: Pulmonary fibrosis.  3/20 urine legionella negative    3/21 Attending: check Blood culture,  Renal US to r/o hydronephrosis.  Syncopal events likely 2/2 orthostatics, c/w IVF, no diuretics.  Hyponatremia, salt tabs.  3/21 Cardio: would not further diurese, needs to be encouraged PO intake with treatment and optimization of pulmonary fibrosis, which can explain many of his symptoms.  3/21 ID: f/u cultures, cont Tamiflu.  Zosyn and Vanco, if all culures negative can d/c Abx.  REnal US : NormalSpleen US: Normal  3/22 Med: Hold Off on antibiotics, Monitor, F/U cards, May need TILT table test, epistaxis likely due to dryness, anemia: Unclear etiology, Bili, Retic Haptog, Retic Ct, Repeat CBC  3/23 Med:  anemia s/p Transfusion, GI imput appreciated, Plan for Scope as outpt, Likely Bone Marrow suppression due to  Viral/Vs Underlying suspected MDS, Suspect automonic dysfunction, Check A1C, B 12  3/23 Hem: kep hgb above 8, stool guiaic again, protonix 40  3/23 GI: outpatient egd, colon if needed. PPIQD,  3/23 ID: continue tamiflu until 3/23. pulm follow outpatient.  3/24- hgb 7.7- No transfusion tonight. 82 yo M p/w Syncopal episode (third one), recently at [facility] for falls where he was found to be hyponatremic  CT Head- Left parietal scalp soft tissue laceration->an enlarging subgaleal hematoma. No evidence for acute intracranial hemorrhage   Echo -Normal LV. Moderate pulmonary hypertension. No Pericardial Effusion. CE neg    Influenza B + completed Tamiflu 3/23  CT chest: Pulmonary fibrosis- pulm outpt follow up  -Legionella negative  - sputum culture, MRSA , ->Vanco & Zosyn.  Blood Cx Neg X 48 hrs ->  Hold Off on antibiotics as of 3/23    Pancytopenia/ anemia   s/p Transfusions  - GI- Plan for Scope as outpt, Likely Bone Marrow suppression d/t  Viral/suspected MDS,     Hyponatremia, started on NaCl tabs, diuretics on hold   Renal US : Normal     3/21 Syncopal events likely 2/2 orthostatics, treated with IVF, no diuretics.  Hyponatremia, salt tabs.  - Cardio: would not further diurese, needs to be encouraged PO intake with treatment and optimization of pulmonary fibrosis which can explain many of his symptoms.    Pt eval rec home w/ home PT 84 yo M p/w Syncopal episode (third one), recently at [facility] for falls where he was found to be hyponatremic  CT Head showed Left parietal scalp soft tissue laceration->an enlarging subgaleal hematoma. No evidence for acute intracranial hemorrhage   Echo -Normal LV. Moderate pulmonary hypertension. No Pericardial Effusion. CE neg  Pt also found to have Influenza B complicated w/ PNA. Pt completed Tamiflu last day 3/23. Sputum culture, MRSA, treated w/Vanco & Zosyn.  Blood Cx Neg X 48 hrs ->   antibiotics stopped as of 3/23. Legionella negative. CT chest: Pulmonary fibrosis- pulm outpt follow up  Pancytopenia/ anemia, s/p Transfusions. GI evaluated Plan for Scope as outpt, Likely Bone Marrow suppression d/t  Viral/suspected MDS,   Hyponatremia, started on NaCl tabs, diuretics on hold. Renal US : Normal   Pt eval rec home w/ home PT

## 2017-03-24 NOTE — DISCHARGE NOTE ADULT - PLAN OF CARE
resolved safety/fall precautions symptoms control BP control CT Head showed Left parietal scalp soft tissue laceration is demonstrated with increasing hyperdense soft tissue compared with the prior exam suggesting  an enlarging subgaleal hematoma. There is no evidence for acute intracranial hemorrhage or territorial infarct. Completed IV antibiotics Zosyn/Vanco. CT showed  pulmonary fibrosis, continue Symbicort and follow up with PCP Completed Tamiflu stable H/H s/p total of 2 units of PRBC transfusion, occult negative. Please call your PCP to refer to GI for outpatient follow up and evaluation for EGD/colonoscopy Echo showed Left ventricular remodeling.  Normal left ventricular systolic function. moderate pulmonary hypertension. Follow up with PCP in 1 week to repeat lab work (BMP/sodium level). Continue salt tablets and follow up with PCP in 1 week for further management hold hydrochlorothiazide for now and follow up with PCP in 1 week. Continue Metoprolol CT Head showed Left parietal scalp soft tissue laceration is demonstrated with increasing hyperdense soft tissue compared with the prior exam suggesting  an enlarging subgaleal hematoma. There is no evidence for acute intracranial hemorrhage or territorial infarct.  DO NOT CHANGE POSITIONS QUICKLY FROM THE BED TO CHAIR OR FROM CHAIR TO STANDING s/p total of 3 units of PRBC transfusion, occult negative. Please call your PCP to refer to GI for outpatient follow up and evaluation for EGD/colonoscopy

## 2017-03-24 NOTE — DISCHARGE NOTE ADULT - HOME CARE AGENCY
[facility] [phone]. Initial visit will be day after discharge home. A nurse will call prior to home visit

## 2017-03-24 NOTE — DISCHARGE NOTE ADULT - MEDICATION SUMMARY - MEDICATIONS TO STOP TAKING
I will STOP taking the medications listed below when I get home from the hospital:    hydroCHLOROthiazide 25 mg oral tablet  -- 1 tab(s) by mouth once a day    Tamiflu 30 mg oral capsule  -- 1 cap(s) by mouth 2 times a day

## 2017-03-24 NOTE — DISCHARGE NOTE ADULT - PATIENT PORTAL LINK FT
“You can access the Follow[employer] Patient Portal, offered by [facility], by registering with the following website: [email or web address]”

## 2017-03-25 LAB
ANISOCYTOSIS BLD QL: SIGNIFICANT CHANGE UP
BASOPHILS # BLD AUTO: 0.03 K/UL — SIGNIFICANT CHANGE UP (ref 0–0.2)
BASOPHILS NFR BLD AUTO: 0.5 % — SIGNIFICANT CHANGE UP (ref 0–2)
BASOPHILS NFR SPEC: 0 % — SIGNIFICANT CHANGE UP (ref 0–2)
BLASTS # FLD: 0 % — SIGNIFICANT CHANGE UP (ref 0–0)
BUN SERPL-MCNC: 17 MG/DL — SIGNIFICANT CHANGE UP (ref 7–23)
CALCIUM SERPL-MCNC: 8.8 MG/DL — SIGNIFICANT CHANGE UP (ref 8.4–10.5)
CHLORIDE SERPL-SCNC: 98 MMOL/L — SIGNIFICANT CHANGE UP (ref 98–107)
CO2 SERPL-SCNC: 23 MMOL/L — SIGNIFICANT CHANGE UP (ref 22–31)
CREAT SERPL-MCNC: 1.08 MG/DL — SIGNIFICANT CHANGE UP (ref 0.5–1.3)
ELLIPTOCYTES BLD QL SMEAR: SLIGHT — SIGNIFICANT CHANGE UP
EOSINOPHIL # BLD AUTO: 0.39 K/UL — SIGNIFICANT CHANGE UP (ref 0–0.5)
EOSINOPHIL NFR BLD AUTO: 5.9 % — SIGNIFICANT CHANGE UP (ref 0–6)
EOSINOPHIL NFR FLD: 5.2 % — SIGNIFICANT CHANGE UP (ref 0–6)
GIANT PLATELETS BLD QL SMEAR: PRESENT — SIGNIFICANT CHANGE UP
GLUCOSE SERPL-MCNC: 100 MG/DL — HIGH (ref 70–99)
HCT VFR BLD CALC: 23.2 % — LOW (ref 39–50)
HGB BLD-MCNC: 7.9 G/DL — LOW (ref 13–17)
IMM GRANULOCYTES NFR BLD AUTO: 3.9 % — HIGH (ref 0–1.5)
LYMPHOCYTES # BLD AUTO: 1.28 K/UL — SIGNIFICANT CHANGE UP (ref 1–3.3)
LYMPHOCYTES # BLD AUTO: 19.4 % — SIGNIFICANT CHANGE UP (ref 13–44)
LYMPHOCYTES NFR SPEC AUTO: 8.7 % — LOW (ref 13–44)
MAGNESIUM SERPL-MCNC: 1.6 MG/DL — SIGNIFICANT CHANGE UP (ref 1.6–2.6)
MCHC RBC-ENTMCNC: 22.6 PG — LOW (ref 27–34)
MCHC RBC-ENTMCNC: 34.1 % — SIGNIFICANT CHANGE UP (ref 32–36)
MCV RBC AUTO: 66.5 FL — LOW (ref 80–100)
METAMYELOCYTES # FLD: 1.7 % — HIGH (ref 0–1)
MICROCYTES BLD QL: SIGNIFICANT CHANGE UP
MONOCYTES # BLD AUTO: 0.56 K/UL — SIGNIFICANT CHANGE UP (ref 0–0.9)
MONOCYTES NFR BLD AUTO: 8.5 % — SIGNIFICANT CHANGE UP (ref 2–14)
MONOCYTES NFR BLD: 8.7 % — SIGNIFICANT CHANGE UP (ref 2–9)
MYELOCYTES NFR BLD: 1.7 % — HIGH (ref 0–0)
NEUTROPHIL AB SER-ACNC: 70.4 % — SIGNIFICANT CHANGE UP (ref 43–77)
NEUTROPHILS # BLD AUTO: 4.09 K/UL — SIGNIFICANT CHANGE UP (ref 1.8–7.4)
NEUTROPHILS NFR BLD AUTO: 61.8 % — SIGNIFICANT CHANGE UP (ref 43–77)
NEUTS BAND # BLD: 2.6 % — SIGNIFICANT CHANGE UP (ref 0–6)
OTHER - HEMATOLOGY %: 0 — SIGNIFICANT CHANGE UP
PLATELET # BLD AUTO: 149 K/UL — LOW (ref 150–400)
PLATELET COUNT - ESTIMATE: NORMAL — SIGNIFICANT CHANGE UP
PMV BLD: 10 FL — SIGNIFICANT CHANGE UP (ref 7–13)
POIKILOCYTOSIS BLD QL AUTO: SLIGHT — SIGNIFICANT CHANGE UP
POLYCHROMASIA BLD QL SMEAR: SLIGHT — SIGNIFICANT CHANGE UP
POTASSIUM SERPL-MCNC: 4.4 MMOL/L — SIGNIFICANT CHANGE UP (ref 3.5–5.3)
POTASSIUM SERPL-SCNC: 4.4 MMOL/L — SIGNIFICANT CHANGE UP (ref 3.5–5.3)
PROMYELOCYTES # FLD: 0 % — SIGNIFICANT CHANGE UP (ref 0–0)
RBC # BLD: 3.49 M/UL — LOW (ref 4.2–5.8)
RBC # FLD: 20.5 % — HIGH (ref 10.3–14.5)
SODIUM SERPL-SCNC: 134 MMOL/L — LOW (ref 135–145)
VARIANT LYMPHS # BLD: 0 % — SIGNIFICANT CHANGE UP
WBC # BLD: 6.61 K/UL — SIGNIFICANT CHANGE UP (ref 3.8–10.5)
WBC # FLD AUTO: 6.61 K/UL — SIGNIFICANT CHANGE UP (ref 3.8–10.5)

## 2017-03-25 RX ORDER — FERROUS SULFATE 325(65) MG
325 TABLET ORAL
Qty: 0 | Refills: 0 | Status: DISCONTINUED | OUTPATIENT
Start: 2017-03-25 | End: 2017-03-26

## 2017-03-25 RX ADMIN — Medication 325 MILLIGRAM(S): at 17:00

## 2017-03-25 RX ADMIN — SODIUM CHLORIDE 1 GRAM(S): 9 INJECTION INTRAMUSCULAR; INTRAVENOUS; SUBCUTANEOUS at 15:58

## 2017-03-25 RX ADMIN — Medication 650 MILLIGRAM(S): at 16:00

## 2017-03-25 RX ADMIN — BUDESONIDE AND FORMOTEROL FUMARATE DIHYDRATE 2 PUFF(S): 160; 4.5 AEROSOL RESPIRATORY (INHALATION) at 08:56

## 2017-03-25 RX ADMIN — Medication 200 MILLIGRAM(S): at 14:13

## 2017-03-25 RX ADMIN — TAMSULOSIN HYDROCHLORIDE 0.4 MILLIGRAM(S): 0.4 CAPSULE ORAL at 21:52

## 2017-03-25 RX ADMIN — Medication 1 APPLICATION(S): at 11:39

## 2017-03-25 RX ADMIN — Medication 650 MILLIGRAM(S): at 22:00

## 2017-03-25 RX ADMIN — HEPARIN SODIUM 5000 UNIT(S): 5000 INJECTION INTRAVENOUS; SUBCUTANEOUS at 06:11

## 2017-03-25 RX ADMIN — Medication 650 MILLIGRAM(S): at 22:45

## 2017-03-25 RX ADMIN — PANTOPRAZOLE SODIUM 40 MILLIGRAM(S): 20 TABLET, DELAYED RELEASE ORAL at 06:12

## 2017-03-25 RX ADMIN — Medication 200 MILLIGRAM(S): at 21:52

## 2017-03-25 RX ADMIN — Medication 650 MILLIGRAM(S): at 17:00

## 2017-03-25 RX ADMIN — SIMVASTATIN 20 MILLIGRAM(S): 20 TABLET, FILM COATED ORAL at 21:52

## 2017-03-25 RX ADMIN — BUDESONIDE AND FORMOTEROL FUMARATE DIHYDRATE 2 PUFF(S): 160; 4.5 AEROSOL RESPIRATORY (INHALATION) at 21:52

## 2017-03-25 RX ADMIN — HEPARIN SODIUM 5000 UNIT(S): 5000 INJECTION INTRAVENOUS; SUBCUTANEOUS at 21:52

## 2017-03-25 RX ADMIN — Medication 100 MILLIGRAM(S): at 06:12

## 2017-03-26 VITALS
TEMPERATURE: 98 F | DIASTOLIC BLOOD PRESSURE: 74 MMHG | RESPIRATION RATE: 18 BRPM | OXYGEN SATURATION: 100 % | HEART RATE: 89 BPM | SYSTOLIC BLOOD PRESSURE: 169 MMHG

## 2017-03-26 LAB
BACTERIA BLD CULT: SIGNIFICANT CHANGE UP
BLD GP AB SCN SERPL QL: NEGATIVE — SIGNIFICANT CHANGE UP
HCT VFR BLD CALC: 23.3 % — LOW (ref 39–50)
HGB BLD-MCNC: 7.7 G/DL — LOW (ref 13–17)
MCHC RBC-ENTMCNC: 22 PG — LOW (ref 27–34)
MCHC RBC-ENTMCNC: 33 % — SIGNIFICANT CHANGE UP (ref 32–36)
MCV RBC AUTO: 66.6 FL — LOW (ref 80–100)
PLATELET # BLD AUTO: 193 K/UL — SIGNIFICANT CHANGE UP (ref 150–400)
PMV BLD: 9.7 FL — SIGNIFICANT CHANGE UP (ref 7–13)
RBC # BLD: 3.5 M/UL — LOW (ref 4.2–5.8)
RBC # FLD: 20.8 % — HIGH (ref 10.3–14.5)
RETICS #: 122.5 10X3/UL — HIGH (ref 17–73)
RETICS/RBC NFR: 3.1 % — HIGH (ref 0.5–2.5)
RH IG SCN BLD-IMP: POSITIVE — SIGNIFICANT CHANGE UP
WBC # BLD: 6.86 K/UL — SIGNIFICANT CHANGE UP (ref 3.8–10.5)
WBC # FLD AUTO: 6.86 K/UL — SIGNIFICANT CHANGE UP (ref 3.8–10.5)

## 2017-03-26 RX ORDER — PANTOPRAZOLE SODIUM 20 MG/1
1 TABLET, DELAYED RELEASE ORAL
Qty: 30 | Refills: 0 | OUTPATIENT
Start: 2017-03-26 | End: 2017-04-25

## 2017-03-26 RX ORDER — LISINOPRIL 2.5 MG/1
1 TABLET ORAL
Qty: 30 | Refills: 0 | OUTPATIENT
Start: 2017-03-26 | End: 2017-04-25

## 2017-03-26 RX ORDER — LISINOPRIL 2.5 MG/1
2.5 TABLET ORAL
Qty: 0 | Refills: 0 | Status: DISCONTINUED | OUTPATIENT
Start: 2017-03-26 | End: 2017-03-26

## 2017-03-26 RX ORDER — BUDESONIDE AND FORMOTEROL FUMARATE DIHYDRATE 160; 4.5 UG/1; UG/1
2 AEROSOL RESPIRATORY (INHALATION)
Qty: 1 | Refills: 0 | OUTPATIENT
Start: 2017-03-26 | End: 2017-04-25

## 2017-03-26 RX ORDER — FERROUS SULFATE 325(65) MG
1 TABLET ORAL
Qty: 90 | Refills: 0 | OUTPATIENT
Start: 2017-03-26 | End: 2017-04-25

## 2017-03-26 RX ORDER — BUDESONIDE AND FORMOTEROL FUMARATE DIHYDRATE 160; 4.5 UG/1; UG/1
2 AEROSOL RESPIRATORY (INHALATION)
Qty: 0 | Refills: 0 | COMMUNITY
Start: 2017-03-26

## 2017-03-26 RX ORDER — SODIUM CHLORIDE 9 MG/ML
1 INJECTION INTRAMUSCULAR; INTRAVENOUS; SUBCUTANEOUS
Qty: 0 | Refills: 0 | COMMUNITY
Start: 2017-03-26

## 2017-03-26 RX ORDER — TAMSULOSIN HYDROCHLORIDE 0.4 MG/1
1 CAPSULE ORAL
Qty: 0 | Refills: 0 | COMMUNITY
Start: 2017-03-26

## 2017-03-26 RX ORDER — PANTOPRAZOLE SODIUM 20 MG/1
1 TABLET, DELAYED RELEASE ORAL
Qty: 0 | Refills: 0 | COMMUNITY
Start: 2017-03-26

## 2017-03-26 RX ADMIN — HEPARIN SODIUM 5000 UNIT(S): 5000 INJECTION INTRAVENOUS; SUBCUTANEOUS at 05:20

## 2017-03-26 RX ADMIN — PANTOPRAZOLE SODIUM 40 MILLIGRAM(S): 20 TABLET, DELAYED RELEASE ORAL at 05:20

## 2017-03-26 RX ADMIN — Medication 650 MILLIGRAM(S): at 17:41

## 2017-03-26 RX ADMIN — Medication 200 MILLIGRAM(S): at 13:00

## 2017-03-26 RX ADMIN — SODIUM CHLORIDE 1 GRAM(S): 9 INJECTION INTRAMUSCULAR; INTRAVENOUS; SUBCUTANEOUS at 05:20

## 2017-03-26 RX ADMIN — HEPARIN SODIUM 5000 UNIT(S): 5000 INJECTION INTRAVENOUS; SUBCUTANEOUS at 13:06

## 2017-03-26 RX ADMIN — Medication 100 MILLIGRAM(S): at 08:33

## 2017-03-26 RX ADMIN — Medication 325 MILLIGRAM(S): at 17:31

## 2017-03-26 RX ADMIN — BUDESONIDE AND FORMOTEROL FUMARATE DIHYDRATE 2 PUFF(S): 160; 4.5 AEROSOL RESPIRATORY (INHALATION) at 08:33

## 2017-03-26 RX ADMIN — Medication 81 MILLIGRAM(S): at 13:00

## 2017-03-26 RX ADMIN — Medication 100 MILLIGRAM(S): at 05:20

## 2017-03-26 RX ADMIN — SODIUM CHLORIDE 1 GRAM(S): 9 INJECTION INTRAMUSCULAR; INTRAVENOUS; SUBCUTANEOUS at 17:31

## 2017-03-26 RX ADMIN — Medication 200 MILLIGRAM(S): at 05:20

## 2017-03-26 RX ADMIN — Medication 325 MILLIGRAM(S): at 08:39

## 2017-03-27 ENCOUNTER — INPATIENT (INPATIENT)
Facility: HOSPITAL | Age: 82
LOS: 6 days | Discharge: NURSING, REHAB OR LONG-TERM CARE | End: 2017-04-03
Attending: LEGAL MEDICINE | Admitting: LEGAL MEDICINE
Payer: MEDICARE

## 2017-03-27 VITALS
OXYGEN SATURATION: 99 % | SYSTOLIC BLOOD PRESSURE: 189 MMHG | HEART RATE: 104 BPM | DIASTOLIC BLOOD PRESSURE: 80 MMHG | RESPIRATION RATE: 16 BRPM | TEMPERATURE: 99 F

## 2017-03-27 DIAGNOSIS — E87.1 HYPO-OSMOLALITY AND HYPONATREMIA: ICD-10-CM

## 2017-03-27 DIAGNOSIS — Z29.9 ENCOUNTER FOR PROPHYLACTIC MEASURES, UNSPECIFIED: ICD-10-CM

## 2017-03-27 DIAGNOSIS — N40.1 BENIGN PROSTATIC HYPERPLASIA WITH LOWER URINARY TRACT SYMPTOMS: ICD-10-CM

## 2017-03-27 DIAGNOSIS — E78.5 HYPERLIPIDEMIA, UNSPECIFIED: ICD-10-CM

## 2017-03-27 DIAGNOSIS — D64.9 ANEMIA, UNSPECIFIED: ICD-10-CM

## 2017-03-27 DIAGNOSIS — M25.552 PAIN IN LEFT HIP: ICD-10-CM

## 2017-03-27 DIAGNOSIS — J84.10 PULMONARY FIBROSIS, UNSPECIFIED: ICD-10-CM

## 2017-03-27 DIAGNOSIS — M25.559 PAIN IN UNSPECIFIED HIP: ICD-10-CM

## 2017-03-27 DIAGNOSIS — I10 ESSENTIAL (PRIMARY) HYPERTENSION: ICD-10-CM

## 2017-03-27 DIAGNOSIS — R58 HEMORRHAGE, NOT ELSEWHERE CLASSIFIED: ICD-10-CM

## 2017-03-27 DIAGNOSIS — I25.810 ATHEROSCLEROSIS OF CORONARY ARTERY BYPASS GRAFT(S) WITHOUT ANGINA PECTORIS: ICD-10-CM

## 2017-03-27 LAB
ALBUMIN SERPL ELPH-MCNC: 3.3 G/DL — SIGNIFICANT CHANGE UP (ref 3.3–5)
ALP SERPL-CCNC: 68 U/L — SIGNIFICANT CHANGE UP (ref 40–120)
ALT FLD-CCNC: 31 U/L — SIGNIFICANT CHANGE UP (ref 4–41)
ANISOCYTOSIS BLD QL: SLIGHT — SIGNIFICANT CHANGE UP
APPEARANCE UR: CLEAR — SIGNIFICANT CHANGE UP
AST SERPL-CCNC: 38 U/L — SIGNIFICANT CHANGE UP (ref 4–40)
BACTERIA # UR AUTO: SIGNIFICANT CHANGE UP
BASOPHILS # BLD AUTO: 0.02 K/UL — SIGNIFICANT CHANGE UP (ref 0–0.2)
BASOPHILS NFR BLD AUTO: 0.2 % — SIGNIFICANT CHANGE UP (ref 0–2)
BASOPHILS NFR SPEC: 0 % — SIGNIFICANT CHANGE UP (ref 0–2)
BILIRUB SERPL-MCNC: 1.8 MG/DL — HIGH (ref 0.2–1.2)
BILIRUB UR-MCNC: NEGATIVE — SIGNIFICANT CHANGE UP
BLASTS # FLD: 0 % — SIGNIFICANT CHANGE UP (ref 0–0)
BLOOD UR QL VISUAL: HIGH
BUN SERPL-MCNC: 23 MG/DL — SIGNIFICANT CHANGE UP (ref 7–23)
CALCIUM SERPL-MCNC: 9.3 MG/DL — SIGNIFICANT CHANGE UP (ref 8.4–10.5)
CHLORIDE SERPL-SCNC: 94 MMOL/L — LOW (ref 98–107)
CO2 SERPL-SCNC: 19 MMOL/L — LOW (ref 22–31)
COLOR SPEC: YELLOW — SIGNIFICANT CHANGE UP
CREAT SERPL-MCNC: 1.08 MG/DL — SIGNIFICANT CHANGE UP (ref 0.5–1.3)
CRP SERPL-MCNC: 38.1 MG/L — HIGH (ref 0.3–5)
EOSINOPHIL # BLD AUTO: 0.01 K/UL — SIGNIFICANT CHANGE UP (ref 0–0.5)
EOSINOPHIL NFR BLD AUTO: 0.1 % — SIGNIFICANT CHANGE UP (ref 0–6)
EOSINOPHIL NFR FLD: 0 % — SIGNIFICANT CHANGE UP (ref 0–6)
GIANT PLATELETS BLD QL SMEAR: PRESENT — SIGNIFICANT CHANGE UP
GLUCOSE SERPL-MCNC: 237 MG/DL — HIGH (ref 70–99)
GLUCOSE UR-MCNC: 250 — SIGNIFICANT CHANGE UP
HCT VFR BLD CALC: 25.3 % — LOW (ref 39–50)
HCT VFR BLD CALC: 26.9 % — LOW (ref 39–50)
HGB BLD-MCNC: 8.4 G/DL — LOW (ref 13–17)
HGB BLD-MCNC: 9 G/DL — LOW (ref 13–17)
IMM GRANULOCYTES NFR BLD AUTO: 5.3 % — HIGH (ref 0–1.5)
KETONES UR-MCNC: SIGNIFICANT CHANGE UP
LEUKOCYTE ESTERASE UR-ACNC: NEGATIVE — SIGNIFICANT CHANGE UP
LYMPHOCYTES # BLD AUTO: 0.42 K/UL — LOW (ref 1–3.3)
LYMPHOCYTES # BLD AUTO: 4.3 % — LOW (ref 13–44)
LYMPHOCYTES NFR SPEC AUTO: 0 % — LOW (ref 13–44)
MACROCYTES BLD QL: SLIGHT — SIGNIFICANT CHANGE UP
MCHC RBC-ENTMCNC: 22.6 PG — LOW (ref 27–34)
MCHC RBC-ENTMCNC: 22.6 PG — LOW (ref 27–34)
MCHC RBC-ENTMCNC: 33.2 % — SIGNIFICANT CHANGE UP (ref 32–36)
MCHC RBC-ENTMCNC: 33.5 % — SIGNIFICANT CHANGE UP (ref 32–36)
MCV RBC AUTO: 67.4 FL — LOW (ref 80–100)
MCV RBC AUTO: 68.2 FL — LOW (ref 80–100)
METAMYELOCYTES # FLD: 0 % — SIGNIFICANT CHANGE UP (ref 0–1)
MONOCYTES # BLD AUTO: 0.18 K/UL — SIGNIFICANT CHANGE UP (ref 0–0.9)
MONOCYTES NFR BLD AUTO: 1.9 % — LOW (ref 2–14)
MONOCYTES NFR BLD: 0.9 % — LOW (ref 2–9)
MUCOUS THREADS # UR AUTO: SIGNIFICANT CHANGE UP
MYELOCYTES NFR BLD: 5.3 % — HIGH (ref 0–0)
NEUTROPHIL AB SER-ACNC: 92.1 % — HIGH (ref 43–77)
NEUTROPHILS # BLD AUTO: 8.57 K/UL — HIGH (ref 1.8–7.4)
NEUTROPHILS NFR BLD AUTO: 88.2 % — HIGH (ref 43–77)
NEUTS BAND # BLD: 1.8 % — SIGNIFICANT CHANGE UP (ref 0–6)
NITRITE UR-MCNC: NEGATIVE — SIGNIFICANT CHANGE UP
NRBC FLD-RTO: 1 — SIGNIFICANT CHANGE UP
OTHER - HEMATOLOGY %: 0 — SIGNIFICANT CHANGE UP
OVALOCYTES BLD QL SMEAR: SLIGHT — SIGNIFICANT CHANGE UP
PH UR: 6 — SIGNIFICANT CHANGE UP (ref 4.6–8)
PLATELET # BLD AUTO: 203 K/UL — SIGNIFICANT CHANGE UP (ref 150–400)
PLATELET # BLD AUTO: 235 K/UL — SIGNIFICANT CHANGE UP (ref 150–400)
PLATELET COUNT - ESTIMATE: NORMAL — SIGNIFICANT CHANGE UP
PMV BLD: 9.1 FL — SIGNIFICANT CHANGE UP (ref 7–13)
PMV BLD: 9.6 FL — SIGNIFICANT CHANGE UP (ref 7–13)
POIKILOCYTOSIS BLD QL AUTO: SLIGHT — SIGNIFICANT CHANGE UP
POLYCHROMASIA BLD QL SMEAR: SLIGHT — SIGNIFICANT CHANGE UP
POTASSIUM SERPL-MCNC: 4.8 MMOL/L — SIGNIFICANT CHANGE UP (ref 3.5–5.3)
POTASSIUM SERPL-SCNC: 4.8 MMOL/L — SIGNIFICANT CHANGE UP (ref 3.5–5.3)
PROMYELOCYTES # FLD: 0 % — SIGNIFICANT CHANGE UP (ref 0–0)
PROT SERPL-MCNC: 8.1 G/DL — SIGNIFICANT CHANGE UP (ref 6–8.3)
PROT UR-MCNC: 100 — SIGNIFICANT CHANGE UP
RBC # BLD: 3.71 M/UL — LOW (ref 4.2–5.8)
RBC # BLD: 3.99 M/UL — LOW (ref 4.2–5.8)
RBC # FLD: 20.7 % — HIGH (ref 10.3–14.5)
RBC # FLD: 21.1 % — HIGH (ref 10.3–14.5)
RBC CASTS # UR COMP ASSIST: SIGNIFICANT CHANGE UP (ref 0–?)
SODIUM SERPL-SCNC: 133 MMOL/L — LOW (ref 135–145)
SP GR SPEC: 1.02 — SIGNIFICANT CHANGE UP (ref 1–1.03)
SQUAMOUS # UR AUTO: SIGNIFICANT CHANGE UP
UROBILINOGEN FLD QL: NORMAL E.U. — SIGNIFICANT CHANGE UP (ref 0.1–0.2)
VARIANT LYMPHS # BLD: 0 % — SIGNIFICANT CHANGE UP
WBC # BLD: 8.01 K/UL — SIGNIFICANT CHANGE UP (ref 3.8–10.5)
WBC # BLD: 9.71 K/UL — SIGNIFICANT CHANGE UP (ref 3.8–10.5)
WBC # FLD AUTO: 8.01 K/UL — SIGNIFICANT CHANGE UP (ref 3.8–10.5)
WBC # FLD AUTO: 9.71 K/UL — SIGNIFICANT CHANGE UP (ref 3.8–10.5)
WBC UR QL: SIGNIFICANT CHANGE UP (ref 0–?)

## 2017-03-27 PROCEDURE — 73700 CT LOWER EXTREMITY W/O DYE: CPT | Mod: 26,LT

## 2017-03-27 PROCEDURE — 72192 CT PELVIS W/O DYE: CPT | Mod: 26

## 2017-03-27 PROCEDURE — 73502 X-RAY EXAM HIP UNI 2-3 VIEWS: CPT | Mod: 26,LT

## 2017-03-27 RX ORDER — TAMSULOSIN HYDROCHLORIDE 0.4 MG/1
0.4 CAPSULE ORAL AT BEDTIME
Qty: 0 | Refills: 0 | Status: DISCONTINUED | OUTPATIENT
Start: 2017-03-27 | End: 2017-04-03

## 2017-03-27 RX ORDER — TRAMADOL HYDROCHLORIDE 50 MG/1
50 TABLET ORAL EVERY 6 HOURS
Qty: 0 | Refills: 0 | Status: DISCONTINUED | OUTPATIENT
Start: 2017-03-27 | End: 2017-03-27

## 2017-03-27 RX ORDER — MORPHINE SULFATE 50 MG/1
4 CAPSULE, EXTENDED RELEASE ORAL ONCE
Qty: 0 | Refills: 0 | Status: DISCONTINUED | OUTPATIENT
Start: 2017-03-27 | End: 2017-03-27

## 2017-03-27 RX ORDER — BUDESONIDE AND FORMOTEROL FUMARATE DIHYDRATE 160; 4.5 UG/1; UG/1
1 AEROSOL RESPIRATORY (INHALATION)
Qty: 0 | Refills: 0 | Status: DISCONTINUED | OUTPATIENT
Start: 2017-03-27 | End: 2017-04-03

## 2017-03-27 RX ORDER — FERROUS SULFATE 325(65) MG
325 TABLET ORAL
Qty: 0 | Refills: 0 | Status: DISCONTINUED | OUTPATIENT
Start: 2017-03-27 | End: 2017-04-03

## 2017-03-27 RX ORDER — METOPROLOL TARTRATE 50 MG
100 TABLET ORAL DAILY
Qty: 0 | Refills: 0 | Status: DISCONTINUED | OUTPATIENT
Start: 2017-03-27 | End: 2017-04-03

## 2017-03-27 RX ORDER — SIMVASTATIN 20 MG/1
20 TABLET, FILM COATED ORAL AT BEDTIME
Qty: 0 | Refills: 0 | Status: DISCONTINUED | OUTPATIENT
Start: 2017-03-27 | End: 2017-04-03

## 2017-03-27 RX ORDER — MORPHINE SULFATE 50 MG/1
1 CAPSULE, EXTENDED RELEASE ORAL ONCE
Qty: 0 | Refills: 0 | Status: DISCONTINUED | OUTPATIENT
Start: 2017-03-27 | End: 2017-03-27

## 2017-03-27 RX ORDER — LISINOPRIL 2.5 MG/1
2.5 TABLET ORAL DAILY
Qty: 0 | Refills: 0 | Status: DISCONTINUED | OUTPATIENT
Start: 2017-03-27 | End: 2017-04-03

## 2017-03-27 RX ORDER — MORPHINE SULFATE 50 MG/1
2 CAPSULE, EXTENDED RELEASE ORAL EVERY 6 HOURS
Qty: 0 | Refills: 0 | Status: DISCONTINUED | OUTPATIENT
Start: 2017-03-27 | End: 2017-03-27

## 2017-03-27 RX ORDER — PANTOPRAZOLE SODIUM 20 MG/1
40 TABLET, DELAYED RELEASE ORAL
Qty: 0 | Refills: 0 | Status: DISCONTINUED | OUTPATIENT
Start: 2017-03-27 | End: 2017-04-03

## 2017-03-27 RX ORDER — ACETAMINOPHEN 500 MG
650 TABLET ORAL EVERY 6 HOURS
Qty: 0 | Refills: 0 | Status: DISCONTINUED | OUTPATIENT
Start: 2017-03-27 | End: 2017-04-03

## 2017-03-27 RX ADMIN — SIMVASTATIN 20 MILLIGRAM(S): 20 TABLET, FILM COATED ORAL at 21:05

## 2017-03-27 RX ADMIN — TAMSULOSIN HYDROCHLORIDE 0.4 MILLIGRAM(S): 0.4 CAPSULE ORAL at 21:05

## 2017-03-27 RX ADMIN — MORPHINE SULFATE 4 MILLIGRAM(S): 50 CAPSULE, EXTENDED RELEASE ORAL at 05:07

## 2017-03-27 RX ADMIN — Medication 325 MILLIGRAM(S): at 18:53

## 2017-03-27 RX ADMIN — MORPHINE SULFATE 4 MILLIGRAM(S): 50 CAPSULE, EXTENDED RELEASE ORAL at 04:44

## 2017-03-27 RX ADMIN — MORPHINE SULFATE 4 MILLIGRAM(S): 50 CAPSULE, EXTENDED RELEASE ORAL at 08:03

## 2017-03-27 RX ADMIN — Medication 325 MILLIGRAM(S): at 13:29

## 2017-03-27 RX ADMIN — Medication 100 MILLIGRAM(S): at 13:29

## 2017-03-27 RX ADMIN — MORPHINE SULFATE 4 MILLIGRAM(S): 50 CAPSULE, EXTENDED RELEASE ORAL at 07:41

## 2017-03-27 NOTE — OCCUPATIONAL THERAPY INITIAL EVALUATION ADULT - PERTINENT HX OF CURRENT PROBLEM, REHAB EVAL
84 y/o M with CAD s/p MI and CABG in 2004, HTN, HLD, BPH, recent admission for 3 falls in the setting of influenza B infection who p/w severe L hip pain.Pt had 3 recent falls at home prior to his previous admission. He was found to have hyponatremia as well as influenza B positive. Pt was D/C on 3/24. Prior to D/C, he mentioned to his daughter that he was having some mild pain in his L hip that was resolved with Tylenol. (see below)

## 2017-03-27 NOTE — PHYSICAL THERAPY INITIAL EVALUATION ADULT - PERTINENT HX OF CURRENT PROBLEM, REHAB EVAL
Pt is a 82 y/o male  with CAD with recent admission for 3 falls in the setting of influenza B infection who presented now  with severe L hip pain.

## 2017-03-27 NOTE — OCCUPATIONAL THERAPY INITIAL EVALUATION ADULT - PLANNED THERAPY INTERVENTIONS, OT EVAL
bed mobility training/ADL retraining/IADL retraining/transfer training/strengthening/balance training

## 2017-03-27 NOTE — OCCUPATIONAL THERAPY INITIAL EVALUATION ADULT - ANTICIPATED DISCHARGE DISPOSITION, OT EVAL
Pt would benefit from inpatient rehabilitation at this time. Please continue to follow progress notes closely.

## 2017-03-27 NOTE — H&P ADULT. - NEUROLOGICAL DETAILS
normal strength/cranial nerves intact/alert and oriented x 3/sensation intact/responds to pain/responds to verbal commands

## 2017-03-27 NOTE — H&P ADULT. - HISTORY OF PRESENT ILLNESS
84 y/o M with CAD s/p MI and CABG in 2004, HTN, HLD, BPH, recent admission for 3 falls in the setting of influenza B infection who presents with severe L hip pain.    Patient had 3 recent falls at home prior to his previous admission. He was found to have hyponatremia as well as influenza B positive. He was treated with tamiflu. He was also noted to have a dropping Hgb while hospitalized. CT abd/pelvis negative for bleed, FOBT negative. Thought to be likely bone marrow suppression due to viral illness. He received 3 units of PRBC. He was scheduled to f/u with [facility] as an outpatient.    Patient was discharged on 3/24. Prior to discharge, he mentioned to his daughter that he was having some mild pain in his L hip that was resolved with tylenol. He was discharged home and was able to walk to the car and into his home. About an hour later, he developed progressive severe L hip pain. He states the pain is constant, sharp. Does not radiate. Worse with movement, but not with palpation. Patient took 1000mg of tylenol and was given as well as decadron at home without improvement so he presented back to the hospital.     On admission, vitals were 97.3, 102, 170/83, 16, 98% on RA. Labs notable for Hgb 9 (from 7.7 on discharge), Na 133, Cr 1.08, Tbili 1.8. UA negative. XR L hip without any acute fracture or dislocation. Patient was given morphine IV with improvement in his pain.

## 2017-03-27 NOTE — H&P ADULT. - MUSCULOSKELETAL
details… detailed exam no joint warmth/no joint swelling/normal strength/ROM intact/no joint erythema

## 2017-03-27 NOTE — ED PROVIDER NOTE - OBJECTIVE STATEMENT
84 yo male h/o htn, cad, discharged yesterday after admission for multiple falls, during admission found to have flu and anemia, had workup which included negative fobt, ct scan negaative for retroperitoneal hematoma. Pt discharged last night and since discharge had severe left hip pain, atraumatic. Pain is nonradiating, no numbness/tingling or weakness in leg, no back pain. took percocet and IM decadron, with no relief in pain. No fevers, chills. No leg pain or swelling

## 2017-03-27 NOTE — OCCUPATIONAL THERAPY INITIAL EVALUATION ADULT - ADDITIONAL COMMENTS
History continued: He was D/C home and was able to walk to the car and into his home. About an hour later, he developed progressive severe L hip pain. XR Left hip (-).

## 2017-03-27 NOTE — H&P ADULT. - PROBLEM SELECTOR PLAN 2
Patient with new ecchymosis over his lower back, band like pattern. Patient had recent CT that ruled out a RP bleed, but this ecchymosis was not present then.  - will trend CBC q12h, if dropping, will repeat CT to eval for RP bleed

## 2017-03-27 NOTE — H&P ADULT. - PROBLEM SELECTOR PLAN 7
Stable  - continue home meds but will hold ASA for now given new ecchymosis, restart if Hgb joey Stable  - continue home meds

## 2017-03-27 NOTE — H&P ADULT. - ASSESSMENT
84 y/o M with CAD s/p MI and CABG in 2004, HTN, HLD, recent admission for 3 falls in the setting of influenza B infection who presents with severe L hip pain.

## 2017-03-27 NOTE — ED ADULT NURSE REASSESSMENT NOTE - NS ED NURSE REASSESS COMMENT FT1
Report received from previous RN. Patient resting in stretcher. Denies pain or any complaints at this time. Resps even and unlabored. VS as noted, patient hypertensive, denies any headache or blurry vision. Awaits bed assignmnet and further orders.

## 2017-03-27 NOTE — H&P ADULT. - PROBLEM SELECTOR PROBLEM 8
Pulmonary fibrosis Coronary artery disease involving coronary bypass graft of native heart without angina pectoris

## 2017-03-27 NOTE — H&P ADULT. - PROBLEM SELECTOR PLAN 4
Likely hypovolemic.  - Encourage PO intake Patient with thalassemia. Hgb stable today from discharge. Had recent worsening of anemia thought to be secondary to bone marrow suppression from viral illness.  - monitor CBC q12h

## 2017-03-27 NOTE — H&P ADULT. - PROBLEM SELECTOR PLAN 1
Patient w/ new onset L posterior hip pain without any trauma. No evidence of fracture on the XR.   - pain management with tylenol, tramadol, percocet, morphine IV (mild, mod, sev, breakthrough)  - PT/OT

## 2017-03-27 NOTE — H&P ADULT. - PROBLEM SELECTOR PLAN 8
Stable  - outpt pulm f/u Stable  - continue home meds but will hold ASA for now given new ecchymosis, restart if Hgb joey

## 2017-03-27 NOTE — H&P ADULT. - PROBLEM SELECTOR PROBLEM 3
Anemia, unspecified type Benign prostatic hyperplasia with lower urinary tract symptoms, unspecified morphology

## 2017-03-27 NOTE — ED PROVIDER NOTE - ATTENDING CONTRIBUTION TO CARE
I, Ariadna Ramirez M.D. have examined the patient and confirmed the essential components of the history, physical examination, diagnosis, and treatment plan. I agree with the patient's care as documented by the resident and amended herein by me. See note above for complete details of service.  82 yo M multiple PMHx including CAD, HLD, HTN, Anemia (2/2 ?Thalassemia, on Procrit), recently admitted  for Syncope, Influenza and PNA (discharged home yesterday) who pw atraumatic L hip pain. Pt had CT abdomen pelvis during last admission which did not show pelvic injury. Denies injury/fall since dc. Pt states symptoms began in hospital but he did not mention them prior to dc. Pt took Oxycodone at home and was administered Dexamethasone without relief. Exam reveals pain with internal rotation of hip and L lateral hip ttp. Distal NVI. + Ecchymosis to lumbar region with no overlying or spinal ttp. Pelvis stable. Plan x-ray eval for bony injury, labs, pain control. Admit for inability to ambulate, pain control.

## 2017-03-27 NOTE — ED ADULT TRIAGE NOTE - CHIEF COMPLAINT QUOTE
Pt BIBA c/o atraumatic L hip pain, unable to ambulate 2/2 pain. Pt took 1 percocet and 8mg Dexamethasone IM at 11:30pm last night with no relief.  Pt discharged at 6pm last night after being hospitalized for 10days s/p fall. PMH anemia, HTN

## 2017-03-27 NOTE — H&P ADULT. - PROBLEM SELECTOR PLAN 3
Patient with thalassemia. Hgb stable today from discharge. Had recent worsening of anemia thought to be secondary to bone marrow suppression from viral illness.  - monitor CBC q12h Urinates every 1-1.5 hours, seen by outpatient uro, had been on flomax but hadn't been taking it.  - restart flomax

## 2017-03-27 NOTE — OCCUPATIONAL THERAPY INITIAL EVALUATION ADULT - LIVES WITH, PROFILE
Pt lives in private house with 2 steps to enter and full flight to manage to bedroom/bathroom. Bathroom includes bathtub./alone

## 2017-03-27 NOTE — ED PROVIDER NOTE - MUSCULOSKELETAL, MLM
Spine appears normal, range of motion is not limited, no muscle or joint tenderness - no tenderness, FROM at hip and knee,

## 2017-03-27 NOTE — H&P ADULT. - PROBLEM SELECTOR PROBLEM 7
Coronary artery disease involving coronary bypass graft of native heart without angina pectoris Dyslipidemia

## 2017-03-28 LAB
ALBUMIN SERPL ELPH-MCNC: 2.7 G/DL — LOW (ref 3.3–5)
ALP SERPL-CCNC: 48 U/L — SIGNIFICANT CHANGE UP (ref 40–120)
ALT FLD-CCNC: 28 U/L — SIGNIFICANT CHANGE UP (ref 4–41)
AST SERPL-CCNC: 55 U/L — HIGH (ref 4–40)
B19V DNA FLD QL NAA+PROBE: SIGNIFICANT CHANGE UP
B19V IGG SER QL: POSITIVE — SIGNIFICANT CHANGE UP
B19V IGG SER-ACNC: 1.2 INDEX — HIGH (ref 0–0.8)
B19V IGM FLD-ACNC: 0.1 INDEX — SIGNIFICANT CHANGE UP (ref 0–0.8)
B19V IGM SER-ACNC: NEGATIVE — SIGNIFICANT CHANGE UP
BASOPHILS # BLD AUTO: 0.01 K/UL — SIGNIFICANT CHANGE UP (ref 0–0.2)
BASOPHILS NFR BLD AUTO: 0.1 % — SIGNIFICANT CHANGE UP (ref 0–2)
BILIRUB SERPL-MCNC: 1.1 MG/DL — SIGNIFICANT CHANGE UP (ref 0.2–1.2)
BLD GP AB SCN SERPL QL: NEGATIVE — SIGNIFICANT CHANGE UP
BUN SERPL-MCNC: 32 MG/DL — HIGH (ref 7–23)
CALCIUM SERPL-MCNC: 8.7 MG/DL — SIGNIFICANT CHANGE UP (ref 8.4–10.5)
CHLORIDE SERPL-SCNC: 98 MMOL/L — SIGNIFICANT CHANGE UP (ref 98–107)
CO2 SERPL-SCNC: 21 MMOL/L — LOW (ref 22–31)
CREAT SERPL-MCNC: 1.09 MG/DL — SIGNIFICANT CHANGE UP (ref 0.5–1.3)
DAT C3-SP REAG RBC QL: NEGATIVE — SIGNIFICANT CHANGE UP
DAT POLY-SP REAG RBC QL: NEGATIVE — SIGNIFICANT CHANGE UP
DIRECT COOMBS IGG: NEGATIVE — SIGNIFICANT CHANGE UP
EOSINOPHIL # BLD AUTO: 0 K/UL — SIGNIFICANT CHANGE UP (ref 0–0.5)
EOSINOPHIL NFR BLD AUTO: 0 % — SIGNIFICANT CHANGE UP (ref 0–6)
FERRITIN SERPL-MCNC: 256.8 NG/ML — SIGNIFICANT CHANGE UP (ref 30–400)
GLUCOSE SERPL-MCNC: 130 MG/DL — HIGH (ref 70–99)
HAPTOGLOB SERPL-MCNC: 89 MG/DL — SIGNIFICANT CHANGE UP (ref 34–200)
HCT VFR BLD CALC: 21.8 % — LOW (ref 39–50)
HCT VFR BLD CALC: 22.3 % — LOW (ref 39–50)
HGB BLD-MCNC: 7.1 G/DL — LOW (ref 13–17)
HGB BLD-MCNC: 7.4 G/DL — LOW (ref 13–17)
IMM GRANULOCYTES NFR BLD AUTO: 1.5 % — SIGNIFICANT CHANGE UP (ref 0–1.5)
LDH SERPL L TO P-CCNC: 350 U/L — HIGH (ref 135–225)
LYMPHOCYTES # BLD AUTO: 1.1 K/UL — SIGNIFICANT CHANGE UP (ref 1–3.3)
LYMPHOCYTES # BLD AUTO: 10.5 % — LOW (ref 13–44)
MCHC RBC-ENTMCNC: 22.6 PG — LOW (ref 27–34)
MCHC RBC-ENTMCNC: 22.8 PG — LOW (ref 27–34)
MCHC RBC-ENTMCNC: 32.6 % — SIGNIFICANT CHANGE UP (ref 32–36)
MCHC RBC-ENTMCNC: 33.2 % — SIGNIFICANT CHANGE UP (ref 32–36)
MCV RBC AUTO: 68.6 FL — LOW (ref 80–100)
MCV RBC AUTO: 69.4 FL — LOW (ref 80–100)
MONOCYTES # BLD AUTO: 0.82 K/UL — SIGNIFICANT CHANGE UP (ref 0–0.9)
MONOCYTES NFR BLD AUTO: 7.8 % — SIGNIFICANT CHANGE UP (ref 2–14)
NEUTROPHILS # BLD AUTO: 8.41 K/UL — HIGH (ref 1.8–7.4)
NEUTROPHILS NFR BLD AUTO: 80.1 % — HIGH (ref 43–77)
NRBC FLD-RTO: 1 — SIGNIFICANT CHANGE UP
PLATELET # BLD AUTO: 243 K/UL — SIGNIFICANT CHANGE UP (ref 150–400)
PLATELET # BLD AUTO: 247 K/UL — SIGNIFICANT CHANGE UP (ref 150–400)
PMV BLD: 9.1 FL — SIGNIFICANT CHANGE UP (ref 7–13)
PMV BLD: 9.8 FL — SIGNIFICANT CHANGE UP (ref 7–13)
POTASSIUM SERPL-MCNC: 5.1 MMOL/L — SIGNIFICANT CHANGE UP (ref 3.5–5.3)
POTASSIUM SERPL-SCNC: 5.1 MMOL/L — SIGNIFICANT CHANGE UP (ref 3.5–5.3)
PROT SERPL-MCNC: 6.6 G/DL — SIGNIFICANT CHANGE UP (ref 6–8.3)
RBC # BLD: 3.14 M/UL — LOW (ref 4.2–5.8)
RBC # BLD: 3.25 M/UL — LOW (ref 4.2–5.8)
RBC # FLD: 21.8 % — HIGH (ref 10.3–14.5)
RBC # FLD: 22.2 % — HIGH (ref 10.3–14.5)
RH IG SCN BLD-IMP: POSITIVE — SIGNIFICANT CHANGE UP
SODIUM SERPL-SCNC: 134 MMOL/L — LOW (ref 135–145)
WBC # BLD: 10.5 K/UL — SIGNIFICANT CHANGE UP (ref 3.8–10.5)
WBC # BLD: 8.91 K/UL — SIGNIFICANT CHANGE UP (ref 3.8–10.5)
WBC # FLD AUTO: 10.5 K/UL — SIGNIFICANT CHANGE UP (ref 3.8–10.5)
WBC # FLD AUTO: 8.91 K/UL — SIGNIFICANT CHANGE UP (ref 3.8–10.5)

## 2017-03-28 PROCEDURE — 99223 1ST HOSP IP/OBS HIGH 75: CPT

## 2017-03-28 RX ORDER — FOLIC ACID 0.8 MG
1 TABLET ORAL DAILY
Qty: 0 | Refills: 0 | Status: DISCONTINUED | OUTPATIENT
Start: 2017-03-28 | End: 2017-04-03

## 2017-03-28 RX ORDER — HEPARIN SODIUM 5000 [USP'U]/ML
5000 INJECTION INTRAVENOUS; SUBCUTANEOUS ONCE
Qty: 0 | Refills: 0 | Status: DISCONTINUED | OUTPATIENT
Start: 2017-03-28 | End: 2017-03-29

## 2017-03-28 RX ORDER — ACETAMINOPHEN 500 MG
650 TABLET ORAL ONCE
Qty: 0 | Refills: 0 | Status: COMPLETED | OUTPATIENT
Start: 2017-03-28 | End: 2017-03-28

## 2017-03-28 RX ORDER — LIDOCAINE HCL 20 MG/ML
20 VIAL (ML) INJECTION ONCE
Qty: 0 | Refills: 0 | Status: DISCONTINUED | OUTPATIENT
Start: 2017-03-28 | End: 2017-04-03

## 2017-03-28 RX ADMIN — LISINOPRIL 2.5 MILLIGRAM(S): 2.5 TABLET ORAL at 05:45

## 2017-03-28 RX ADMIN — BUDESONIDE AND FORMOTEROL FUMARATE DIHYDRATE 1 PUFF(S): 160; 4.5 AEROSOL RESPIRATORY (INHALATION) at 21:22

## 2017-03-28 RX ADMIN — Medication 325 MILLIGRAM(S): at 08:32

## 2017-03-28 RX ADMIN — Medication 1 MILLIGRAM(S): at 18:05

## 2017-03-28 RX ADMIN — Medication 325 MILLIGRAM(S): at 13:21

## 2017-03-28 RX ADMIN — Medication 325 MILLIGRAM(S): at 18:05

## 2017-03-28 RX ADMIN — TAMSULOSIN HYDROCHLORIDE 0.4 MILLIGRAM(S): 0.4 CAPSULE ORAL at 21:22

## 2017-03-28 RX ADMIN — PANTOPRAZOLE SODIUM 40 MILLIGRAM(S): 20 TABLET, DELAYED RELEASE ORAL at 05:45

## 2017-03-28 RX ADMIN — Medication 650 MILLIGRAM(S): at 20:31

## 2017-03-28 RX ADMIN — SIMVASTATIN 20 MILLIGRAM(S): 20 TABLET, FILM COATED ORAL at 21:23

## 2017-03-28 RX ADMIN — Medication 100 MILLIGRAM(S): at 05:45

## 2017-03-28 RX ADMIN — BUDESONIDE AND FORMOTEROL FUMARATE DIHYDRATE 1 PUFF(S): 160; 4.5 AEROSOL RESPIRATORY (INHALATION) at 09:09

## 2017-03-28 RX ADMIN — Medication 200 MILLIGRAM(S): at 05:45

## 2017-03-29 LAB
BUN SERPL-MCNC: 35 MG/DL — HIGH (ref 7–23)
BUN SERPL-MCNC: 36 MG/DL — HIGH (ref 7–23)
CALCIUM SERPL-MCNC: 8.4 MG/DL — SIGNIFICANT CHANGE UP (ref 8.4–10.5)
CALCIUM SERPL-MCNC: 8.8 MG/DL — SIGNIFICANT CHANGE UP (ref 8.4–10.5)
CHLORIDE SERPL-SCNC: 106 MMOL/L — SIGNIFICANT CHANGE UP (ref 98–107)
CHLORIDE SERPL-SCNC: 98 MMOL/L — SIGNIFICANT CHANGE UP (ref 98–107)
CO2 SERPL-SCNC: 15 MMOL/L — LOW (ref 22–31)
CO2 SERPL-SCNC: 21 MMOL/L — LOW (ref 22–31)
CREAT SERPL-MCNC: 1.2 MG/DL — SIGNIFICANT CHANGE UP (ref 0.5–1.3)
CREAT SERPL-MCNC: 1.28 MG/DL — SIGNIFICANT CHANGE UP (ref 0.5–1.3)
GLUCOSE SERPL-MCNC: 134 MG/DL — HIGH (ref 70–99)
GLUCOSE SERPL-MCNC: 87 MG/DL — SIGNIFICANT CHANGE UP (ref 70–99)
HCT VFR BLD CALC: 24.5 % — LOW (ref 39–50)
HCT VFR BLD CALC: 25 % — LOW (ref 39–50)
HCT VFR BLD CALC: 26.6 % — LOW (ref 39–50)
HCT VFR BLD CALC: 27.4 % — LOW (ref 39–50)
HGB A MFR BLD: 95.2 % — SIGNIFICANT CHANGE UP
HGB A2 MFR BLD: 4.3 % — HIGH (ref 2.4–3.5)
HGB BLD-MCNC: 8.2 G/DL — LOW (ref 13–17)
HGB BLD-MCNC: 8.4 G/DL — LOW (ref 13–17)
HGB BLD-MCNC: 8.6 G/DL — LOW (ref 13–17)
HGB BLD-MCNC: 9 G/DL — LOW (ref 13–17)
HGB ELECT COMMENT: SIGNIFICANT CHANGE UP
HGB F MFR BLD: < 1 % — SIGNIFICANT CHANGE UP (ref 0–1.5)
MAGNESIUM SERPL-MCNC: 1.9 MG/DL — SIGNIFICANT CHANGE UP (ref 1.6–2.6)
MCHC RBC-ENTMCNC: 23.2 PG — LOW (ref 27–34)
MCHC RBC-ENTMCNC: 23.6 PG — LOW (ref 27–34)
MCHC RBC-ENTMCNC: 24 PG — LOW (ref 27–34)
MCHC RBC-ENTMCNC: 24.1 PG — LOW (ref 27–34)
MCHC RBC-ENTMCNC: 32.3 % — SIGNIFICANT CHANGE UP (ref 32–36)
MCHC RBC-ENTMCNC: 32.8 % — SIGNIFICANT CHANGE UP (ref 32–36)
MCHC RBC-ENTMCNC: 33.5 % — SIGNIFICANT CHANGE UP (ref 32–36)
MCHC RBC-ENTMCNC: 33.6 % — SIGNIFICANT CHANGE UP (ref 32–36)
MCV RBC AUTO: 71.6 FL — LOW (ref 80–100)
MCV RBC AUTO: 71.7 FL — LOW (ref 80–100)
MCV RBC AUTO: 71.8 FL — LOW (ref 80–100)
MCV RBC AUTO: 71.9 FL — LOW (ref 80–100)
NRBC FLD-RTO: 1.1 — SIGNIFICANT CHANGE UP
NRBC FLD-RTO: 1.1 — SIGNIFICANT CHANGE UP
NRBC FLD-RTO: 1.2 — SIGNIFICANT CHANGE UP
NRBC FLD-RTO: 1.3 — SIGNIFICANT CHANGE UP
OB PNL STL: NEGATIVE — SIGNIFICANT CHANGE UP
PHOSPHATE SERPL-MCNC: 3.5 MG/DL — SIGNIFICANT CHANGE UP (ref 2.5–4.5)
PLATELET # BLD AUTO: 221 K/UL — SIGNIFICANT CHANGE UP (ref 150–400)
PLATELET # BLD AUTO: 221 K/UL — SIGNIFICANT CHANGE UP (ref 150–400)
PLATELET # BLD AUTO: 252 K/UL — SIGNIFICANT CHANGE UP (ref 150–400)
PLATELET # BLD AUTO: 255 K/UL — SIGNIFICANT CHANGE UP (ref 150–400)
PMV BLD: 9.3 FL — SIGNIFICANT CHANGE UP (ref 7–13)
PMV BLD: 9.5 FL — SIGNIFICANT CHANGE UP (ref 7–13)
PMV BLD: 9.5 FL — SIGNIFICANT CHANGE UP (ref 7–13)
PMV BLD: 9.6 FL — SIGNIFICANT CHANGE UP (ref 7–13)
POTASSIUM SERPL-MCNC: 4.7 MMOL/L — SIGNIFICANT CHANGE UP (ref 3.5–5.3)
POTASSIUM SERPL-MCNC: 5.4 MMOL/L — HIGH (ref 3.5–5.3)
POTASSIUM SERPL-SCNC: 4.7 MMOL/L — SIGNIFICANT CHANGE UP (ref 3.5–5.3)
POTASSIUM SERPL-SCNC: 5.4 MMOL/L — HIGH (ref 3.5–5.3)
RBC # BLD: 3.41 M/UL — LOW (ref 4.2–5.8)
RBC # BLD: 3.49 M/UL — LOW (ref 4.2–5.8)
RBC # BLD: 3.71 M/UL — LOW (ref 4.2–5.8)
RBC # BLD: 3.81 M/UL — LOW (ref 4.2–5.8)
RBC # FLD: 22.3 % — HIGH (ref 10.3–14.5)
RBC # FLD: 22.4 % — HIGH (ref 10.3–14.5)
RBC # FLD: 22.4 % — HIGH (ref 10.3–14.5)
RBC # FLD: 22.6 % — HIGH (ref 10.3–14.5)
SODIUM SERPL-SCNC: 134 MMOL/L — LOW (ref 135–145)
SODIUM SERPL-SCNC: 142 MMOL/L — SIGNIFICANT CHANGE UP (ref 135–145)
WBC # BLD: 7.11 K/UL — SIGNIFICANT CHANGE UP (ref 3.8–10.5)
WBC # BLD: 7.31 K/UL — SIGNIFICANT CHANGE UP (ref 3.8–10.5)
WBC # BLD: 7.53 K/UL — SIGNIFICANT CHANGE UP (ref 3.8–10.5)
WBC # BLD: 8.23 K/UL — SIGNIFICANT CHANGE UP (ref 3.8–10.5)
WBC # FLD AUTO: 7.11 K/UL — SIGNIFICANT CHANGE UP (ref 3.8–10.5)
WBC # FLD AUTO: 7.31 K/UL — SIGNIFICANT CHANGE UP (ref 3.8–10.5)
WBC # FLD AUTO: 7.53 K/UL — SIGNIFICANT CHANGE UP (ref 3.8–10.5)
WBC # FLD AUTO: 8.23 K/UL — SIGNIFICANT CHANGE UP (ref 3.8–10.5)

## 2017-03-29 PROCEDURE — 74176 CT ABD & PELVIS W/O CONTRAST: CPT | Mod: 26

## 2017-03-29 RX ADMIN — Medication 100 MILLIGRAM(S): at 07:01

## 2017-03-29 RX ADMIN — PANTOPRAZOLE SODIUM 40 MILLIGRAM(S): 20 TABLET, DELAYED RELEASE ORAL at 07:01

## 2017-03-29 RX ADMIN — TAMSULOSIN HYDROCHLORIDE 0.4 MILLIGRAM(S): 0.4 CAPSULE ORAL at 22:09

## 2017-03-29 RX ADMIN — Medication 200 MILLIGRAM(S): at 19:50

## 2017-03-29 RX ADMIN — Medication 325 MILLIGRAM(S): at 10:42

## 2017-03-29 RX ADMIN — BUDESONIDE AND FORMOTEROL FUMARATE DIHYDRATE 1 PUFF(S): 160; 4.5 AEROSOL RESPIRATORY (INHALATION) at 22:09

## 2017-03-29 RX ADMIN — BUDESONIDE AND FORMOTEROL FUMARATE DIHYDRATE 1 PUFF(S): 160; 4.5 AEROSOL RESPIRATORY (INHALATION) at 10:42

## 2017-03-29 RX ADMIN — SIMVASTATIN 20 MILLIGRAM(S): 20 TABLET, FILM COATED ORAL at 22:09

## 2017-03-30 LAB
APTT BLD: 26.5 SEC — LOW (ref 27.5–37.4)
BUN SERPL-MCNC: 30 MG/DL — HIGH (ref 7–23)
CALCIUM SERPL-MCNC: 8.5 MG/DL — SIGNIFICANT CHANGE UP (ref 8.4–10.5)
CHLORIDE SERPL-SCNC: 103 MMOL/L — SIGNIFICANT CHANGE UP (ref 98–107)
CO2 SERPL-SCNC: 21 MMOL/L — LOW (ref 22–31)
CREAT SERPL-MCNC: 1.08 MG/DL — SIGNIFICANT CHANGE UP (ref 0.5–1.3)
FACT VIII ACT/NOR PPP: 320.1 % — HIGH (ref 50–125)
FIBRINOGEN PPP-MCNC: 513 MG/DL — HIGH (ref 310–510)
G6PD RBC-CCNC: 15 ZZ — HIGH (ref 4.6–13.5)
GLUCOSE SERPL-MCNC: 113 MG/DL — HIGH (ref 70–99)
HCT VFR BLD CALC: 25.2 % — LOW (ref 39–50)
HCT VFR BLD CALC: 26.4 % — LOW (ref 39–50)
HCT VFR BLD CALC: 27.6 % — LOW (ref 39–50)
HGB BLD-MCNC: 8.2 G/DL — LOW (ref 13–17)
HGB BLD-MCNC: 8.7 G/DL — LOW (ref 13–17)
HGB BLD-MCNC: 8.9 G/DL — LOW (ref 13–17)
INR BLD: 1.08 — SIGNIFICANT CHANGE UP (ref 0.88–1.17)
MCHC RBC-ENTMCNC: 23.2 PG — LOW (ref 27–34)
MCHC RBC-ENTMCNC: 23.4 PG — LOW (ref 27–34)
MCHC RBC-ENTMCNC: 23.6 PG — LOW (ref 27–34)
MCHC RBC-ENTMCNC: 32.2 % — SIGNIFICANT CHANGE UP (ref 32–36)
MCHC RBC-ENTMCNC: 32.5 % — SIGNIFICANT CHANGE UP (ref 32–36)
MCHC RBC-ENTMCNC: 33 % — SIGNIFICANT CHANGE UP (ref 32–36)
MCV RBC AUTO: 71.4 FL — LOW (ref 80–100)
MCV RBC AUTO: 71.7 FL — LOW (ref 80–100)
MCV RBC AUTO: 72.6 FL — LOW (ref 80–100)
NRBC FLD-RTO: 1.1 — SIGNIFICANT CHANGE UP
NRBC FLD-RTO: 1.2 — SIGNIFICANT CHANGE UP
PLATELET # BLD AUTO: 227 K/UL — SIGNIFICANT CHANGE UP (ref 150–400)
PLATELET # BLD AUTO: 239 K/UL — SIGNIFICANT CHANGE UP (ref 150–400)
PLATELET # BLD AUTO: 243 K/UL — SIGNIFICANT CHANGE UP (ref 150–400)
PMV BLD: 8.9 FL — SIGNIFICANT CHANGE UP (ref 7–13)
PMV BLD: 9.1 FL — SIGNIFICANT CHANGE UP (ref 7–13)
PMV BLD: 9.5 FL — SIGNIFICANT CHANGE UP (ref 7–13)
POTASSIUM SERPL-MCNC: 4.5 MMOL/L — SIGNIFICANT CHANGE UP (ref 3.5–5.3)
POTASSIUM SERPL-SCNC: 4.5 MMOL/L — SIGNIFICANT CHANGE UP (ref 3.5–5.3)
PROTHROM AB SERPL-ACNC: 12.1 SEC — SIGNIFICANT CHANGE UP (ref 9.8–13.1)
RBC # BLD: 3.53 M/UL — LOW (ref 4.2–5.8)
RBC # BLD: 3.68 M/UL — LOW (ref 4.2–5.8)
RBC # BLD: 3.8 M/UL — LOW (ref 4.2–5.8)
RBC # FLD: 22.7 % — HIGH (ref 10.3–14.5)
RBC # FLD: 22.8 % — HIGH (ref 10.3–14.5)
RBC # FLD: 22.8 % — HIGH (ref 10.3–14.5)
SODIUM SERPL-SCNC: 136 MMOL/L — SIGNIFICANT CHANGE UP (ref 135–145)
VWF AG PPP-ACNC: 481.9 % — HIGH (ref 50–150)
VWF:RCO ACT/NOR PPP PL AGG: 353.4 % — HIGH (ref 43–126)
WBC # BLD: 6.31 K/UL — SIGNIFICANT CHANGE UP (ref 3.8–10.5)
WBC # BLD: 6.4 K/UL — SIGNIFICANT CHANGE UP (ref 3.8–10.5)
WBC # BLD: 6.69 K/UL — SIGNIFICANT CHANGE UP (ref 3.8–10.5)
WBC # FLD AUTO: 6.31 K/UL — SIGNIFICANT CHANGE UP (ref 3.8–10.5)
WBC # FLD AUTO: 6.4 K/UL — SIGNIFICANT CHANGE UP (ref 3.8–10.5)
WBC # FLD AUTO: 6.69 K/UL — SIGNIFICANT CHANGE UP (ref 3.8–10.5)

## 2017-03-30 RX ADMIN — BUDESONIDE AND FORMOTEROL FUMARATE DIHYDRATE 1 PUFF(S): 160; 4.5 AEROSOL RESPIRATORY (INHALATION) at 09:02

## 2017-03-30 RX ADMIN — Medication 1 MILLIGRAM(S): at 13:48

## 2017-03-30 RX ADMIN — TAMSULOSIN HYDROCHLORIDE 0.4 MILLIGRAM(S): 0.4 CAPSULE ORAL at 23:07

## 2017-03-30 RX ADMIN — Medication 100 MILLIGRAM(S): at 06:49

## 2017-03-30 RX ADMIN — SIMVASTATIN 20 MILLIGRAM(S): 20 TABLET, FILM COATED ORAL at 23:07

## 2017-03-30 RX ADMIN — Medication 325 MILLIGRAM(S): at 17:30

## 2017-03-30 RX ADMIN — PANTOPRAZOLE SODIUM 40 MILLIGRAM(S): 20 TABLET, DELAYED RELEASE ORAL at 06:49

## 2017-03-30 RX ADMIN — BUDESONIDE AND FORMOTEROL FUMARATE DIHYDRATE 1 PUFF(S): 160; 4.5 AEROSOL RESPIRATORY (INHALATION) at 21:04

## 2017-03-30 RX ADMIN — Medication 325 MILLIGRAM(S): at 09:03

## 2017-03-30 RX ADMIN — Medication 325 MILLIGRAM(S): at 13:48

## 2017-03-31 LAB
BUN SERPL-MCNC: 31 MG/DL — HIGH (ref 7–23)
CALCIUM SERPL-MCNC: 8.8 MG/DL — SIGNIFICANT CHANGE UP (ref 8.4–10.5)
CHLORIDE SERPL-SCNC: 100 MMOL/L — SIGNIFICANT CHANGE UP (ref 98–107)
CO2 SERPL-SCNC: 22 MMOL/L — SIGNIFICANT CHANGE UP (ref 22–31)
CREAT SERPL-MCNC: 1.03 MG/DL — SIGNIFICANT CHANGE UP (ref 0.5–1.3)
GLUCOSE SERPL-MCNC: 130 MG/DL — HIGH (ref 70–99)
HCT VFR BLD CALC: 27.1 % — LOW (ref 39–50)
HGB BLD-MCNC: 9 G/DL — LOW (ref 13–17)
MCHC RBC-ENTMCNC: 24.1 PG — LOW (ref 27–34)
MCHC RBC-ENTMCNC: 33.2 % — SIGNIFICANT CHANGE UP (ref 32–36)
MCV RBC AUTO: 72.5 FL — LOW (ref 80–100)
PLATELET # BLD AUTO: 261 K/UL — SIGNIFICANT CHANGE UP (ref 150–400)
PMV BLD: 9.2 FL — SIGNIFICANT CHANGE UP (ref 7–13)
POTASSIUM SERPL-MCNC: 4.6 MMOL/L — SIGNIFICANT CHANGE UP (ref 3.5–5.3)
POTASSIUM SERPL-SCNC: 4.6 MMOL/L — SIGNIFICANT CHANGE UP (ref 3.5–5.3)
RBC # BLD: 3.74 M/UL — LOW (ref 4.2–5.8)
RBC # FLD: 22.9 % — HIGH (ref 10.3–14.5)
SODIUM SERPL-SCNC: 138 MMOL/L — SIGNIFICANT CHANGE UP (ref 135–145)
WBC # BLD: 5.23 K/UL — SIGNIFICANT CHANGE UP (ref 3.8–10.5)
WBC # FLD AUTO: 5.23 K/UL — SIGNIFICANT CHANGE UP (ref 3.8–10.5)

## 2017-03-31 RX ADMIN — Medication 325 MILLIGRAM(S): at 14:00

## 2017-03-31 RX ADMIN — TAMSULOSIN HYDROCHLORIDE 0.4 MILLIGRAM(S): 0.4 CAPSULE ORAL at 23:01

## 2017-03-31 RX ADMIN — Medication 325 MILLIGRAM(S): at 09:47

## 2017-03-31 RX ADMIN — Medication 325 MILLIGRAM(S): at 18:35

## 2017-03-31 RX ADMIN — Medication 1 MILLIGRAM(S): at 14:01

## 2017-03-31 RX ADMIN — BUDESONIDE AND FORMOTEROL FUMARATE DIHYDRATE 1 PUFF(S): 160; 4.5 AEROSOL RESPIRATORY (INHALATION) at 21:00

## 2017-03-31 RX ADMIN — SIMVASTATIN 20 MILLIGRAM(S): 20 TABLET, FILM COATED ORAL at 23:01

## 2017-03-31 RX ADMIN — BUDESONIDE AND FORMOTEROL FUMARATE DIHYDRATE 1 PUFF(S): 160; 4.5 AEROSOL RESPIRATORY (INHALATION) at 09:47

## 2017-03-31 RX ADMIN — PANTOPRAZOLE SODIUM 40 MILLIGRAM(S): 20 TABLET, DELAYED RELEASE ORAL at 06:54

## 2017-03-31 RX ADMIN — Medication 100 MILLIGRAM(S): at 06:54

## 2017-04-01 LAB
BUN SERPL-MCNC: 33 MG/DL — HIGH (ref 7–23)
CALCIUM SERPL-MCNC: 8.9 MG/DL — SIGNIFICANT CHANGE UP (ref 8.4–10.5)
CHLORIDE SERPL-SCNC: 100 MMOL/L — SIGNIFICANT CHANGE UP (ref 98–107)
CO2 SERPL-SCNC: 21 MMOL/L — LOW (ref 22–31)
CREAT SERPL-MCNC: 1.02 MG/DL — SIGNIFICANT CHANGE UP (ref 0.5–1.3)
GLUCOSE SERPL-MCNC: 126 MG/DL — HIGH (ref 70–99)
HCT VFR BLD CALC: 28.4 % — LOW (ref 39–50)
HGB BLD-MCNC: 9.1 G/DL — LOW (ref 13–17)
MCHC RBC-ENTMCNC: 23.5 PG — LOW (ref 27–34)
MCHC RBC-ENTMCNC: 32 % — SIGNIFICANT CHANGE UP (ref 32–36)
MCV RBC AUTO: 73.4 FL — LOW (ref 80–100)
PLATELET # BLD AUTO: 271 K/UL — SIGNIFICANT CHANGE UP (ref 150–400)
PMV BLD: 9.2 FL — SIGNIFICANT CHANGE UP (ref 7–13)
POTASSIUM SERPL-MCNC: 4.6 MMOL/L — SIGNIFICANT CHANGE UP (ref 3.5–5.3)
POTASSIUM SERPL-SCNC: 4.6 MMOL/L — SIGNIFICANT CHANGE UP (ref 3.5–5.3)
RBC # BLD: 3.87 M/UL — LOW (ref 4.2–5.8)
RBC # FLD: 23.1 % — HIGH (ref 10.3–14.5)
SODIUM SERPL-SCNC: 136 MMOL/L — SIGNIFICANT CHANGE UP (ref 135–145)
WBC # BLD: 5.62 K/UL — SIGNIFICANT CHANGE UP (ref 3.8–10.5)
WBC # FLD AUTO: 5.62 K/UL — SIGNIFICANT CHANGE UP (ref 3.8–10.5)

## 2017-04-01 RX ORDER — ASPIRIN/CALCIUM CARB/MAGNESIUM 324 MG
81 TABLET ORAL DAILY
Qty: 0 | Refills: 0 | Status: DISCONTINUED | OUTPATIENT
Start: 2017-04-01 | End: 2017-04-01

## 2017-04-01 RX ADMIN — TAMSULOSIN HYDROCHLORIDE 0.4 MILLIGRAM(S): 0.4 CAPSULE ORAL at 21:35

## 2017-04-01 RX ADMIN — Medication 100 MILLIGRAM(S): at 05:25

## 2017-04-01 RX ADMIN — Medication 325 MILLIGRAM(S): at 08:23

## 2017-04-01 RX ADMIN — BUDESONIDE AND FORMOTEROL FUMARATE DIHYDRATE 1 PUFF(S): 160; 4.5 AEROSOL RESPIRATORY (INHALATION) at 08:24

## 2017-04-01 RX ADMIN — Medication 1 MILLIGRAM(S): at 13:58

## 2017-04-01 RX ADMIN — BUDESONIDE AND FORMOTEROL FUMARATE DIHYDRATE 1 PUFF(S): 160; 4.5 AEROSOL RESPIRATORY (INHALATION) at 21:35

## 2017-04-01 RX ADMIN — Medication 325 MILLIGRAM(S): at 17:05

## 2017-04-01 RX ADMIN — PANTOPRAZOLE SODIUM 40 MILLIGRAM(S): 20 TABLET, DELAYED RELEASE ORAL at 05:27

## 2017-04-01 RX ADMIN — Medication 325 MILLIGRAM(S): at 13:58

## 2017-04-01 RX ADMIN — SIMVASTATIN 20 MILLIGRAM(S): 20 TABLET, FILM COATED ORAL at 21:35

## 2017-04-01 NOTE — DISCHARGE NOTE ADULT - PATIENT PORTAL LINK FT
“You can access the Follow[employer] Patient Portal, offered by [facility], by registering with the following website: [email or web address]”

## 2017-04-01 NOTE — DISCHARGE NOTE ADULT - HOSPITAL COURSE
82 y/o M with CAD s/p MI and CABG in 2004, HTN, HLD, BPH recent admission for 3 falls in the setting of influenza B infection who presents with severe L hip pain.    Hospital Course    LEFT PSOAS MUSCLE HEMATOMA - Patient w/ new onset L posterior hip pain without any trauma. Lt Hip XRay -No acute left hip fracture or dislocation. PT/OT - Rehab. Patient with new ecchymosis over his lower back, band like pattern. Patient had recent CT that ruled out a RP bleed, but this ecchymosis was not present then. Sx consulted repeat ct to evaluate psoas muscle hematoma: rec CT abd and pelvis with IV contrast family refused agreed to ct abd and pelvis without contrast. 4/1 Sx signed off no sx intervention     3/27 CT pelvis - Large intramuscular hematoma within the psoas muscle with surrounding stranding in the fat as detailed above. Smaller hematomas within the left gluteus tarah muscle as well as in the posterior subcutaneous soft tissues at the level of the sacrum.    3/29: CT Abd/Pelvis-  Left psoas hematoma is slightly reduced in size when comparing axial measurements, currently measuring 6.2 x 7.0 cm and previously measuring 7.2 x 7.6 cm. The craniocaudad dimension is approximately 18 cm, the prior CT hip did not image the superior portion of the hematoma.    BPH- restarted Flomax urology as an out pt     ANEMIA- Patient with thalassemia.  Monitored CBC q12h. OCCULT BLOOD -NEG. 3/28 s/p 1 unit PRBC. PT /INR Wnl, PTT 26.5 decreased; Fibrinogen 513 elevated, factor 7 320.1 elevated, Willebrand factor 481.9 elevated, .4 elevated, f/u with Dr. Efren wilcox as an out pt.    CAD- continue home meds, hold ASA for now given new ecchymosis, restart if Hgb stable, out pt Card f/u.     Decrease cr clearance - Renal following, creatinine wnl at this time

## 2017-04-01 NOTE — DISCHARGE NOTE ADULT - NS TRANSFER PATIENT BELONGINGS
Jewelry/Money (specify)/Clothing/necklace; cell phone; cell phone case; /Electronic Device (specify)

## 2017-04-01 NOTE — DISCHARGE NOTE ADULT - MEDICATION SUMMARY - MEDICATIONS TO STOP TAKING
I will STOP taking the medications listed below when I get home from the hospital:    aspirin 81 mg oral tablet  -- 1 tab(s) by mouth once a day (at bedtime)    sodium chloride 1 g oral tablet  -- 1 tab(s) by mouth 2 times a day (before meals)

## 2017-04-01 NOTE — DISCHARGE NOTE ADULT - COMMUNITY RESOURCES
[facility] address:[address] [place] [address] tel [phone], 5pm  via [facility] tel # [phone]

## 2017-04-01 NOTE — DISCHARGE NOTE ADULT - PROVIDER TOKENS
TOKDONNA:'[id number]' TOKEN:'[id number]',FREE:[LAST:[Dr Diop],PHONE:[(   )    -],FAX:[(   )    -]]

## 2017-04-01 NOTE — DISCHARGE NOTE ADULT - ADDITIONAL INSTRUCTIONS
Follow up with PCP for further management and treatment.  3/27 CT pelvis - Large intramuscular hematoma within the psoas muscle with surrounding stranding in the fat as detailed above. Smaller hematomas within the left gluteus tarah muscle as well as in the posterior subcutaneous soft tissues at the level of the sacrum. ASA held in the hospital.

## 2017-04-01 NOTE — DISCHARGE NOTE ADULT - MEDICATION SUMMARY - MEDICATIONS TO TAKE
I will START or STAY ON the medications listed below when I get home from the hospital:    Rolling walker, Dx R55, E87.1, J10.1  -- Indication: For Arthralgia of hip    acetaminophen 325 mg oral tablet  -- 2 tab(s) by mouth every 6 hours, As needed, Mild Pain (1 - 3)  -- Indication: For Pain of left hip joint    acetaminophen-oxyCODONE 325 mg-5 mg oral tablet  -- 1 tab(s) by mouth every 6 hours, As needed, Severe Pain (7 - 10)  -- Indication: For Pain of left hip joint    lisinopril 2.5 mg oral tablet  -- 1 tab(s) by mouth once a day  -- Indication: For Essential hypertension    tamsulosin 0.4 mg oral capsule  -- 1 cap(s) by mouth once a day (at bedtime)  -- Indication: For Benign prostatic hyperplasia with lower urinary tract symptoms, unspecified morphology    Zocor 20 mg oral tablet  -- 1 tab(s) by mouth once a day (at bedtime)  -- Indication: For HLD    metoprolol succinate 100 mg oral tablet, extended release  -- 1 tab(s) by mouth once a day  -- Indication: For Essential hypertension    budesonide-formoterol 160 mcg-4.5 mcg/inh inhalation aerosol  -- 2 puff(s) inhaled 2 times a day  -- Indication: For Pulmonary fibrosis    guaiFENesin 100 mg/5 mL oral liquid  -- 5 milliliter(s) by mouth every 6 hours, As needed, Cough  -- Indication: For Cough    ferrous sulfate 325 mg (65 mg elemental iron) oral tablet  -- 1 tab(s) by mouth 3 times a day  -- Indication: For Anemia, unspecified type    Flonase 50 mcg/inh nasal spray  -- 1 spray(s) into nose once a day  -- Indication: For Pulmonary fibrosis    pantoprazole 40 mg oral delayed release tablet  -- 1 tab(s) by mouth once a day (before a meal)  -- Indication: For GERD    folic acid 1 mg oral tablet  -- 1 tab(s) by mouth once a day  -- Indication: For supplement

## 2017-04-01 NOTE — DISCHARGE NOTE ADULT - CARE PLAN
Principal Discharge DX:	Hematoma of lower extremity, left, initial encounter  Goal:	resolution  Secondary Diagnosis:	Anemia, unspecified type  Goal:	management Principal Discharge DX:	Hematoma of lower extremity, left, initial encounter  Goal:	resolution  Instructions for follow-up, activity and diet:	Follow up with PCP for further management and treatment. 3/27 CT pelvis - Large intramuscular hematoma within the psoas muscle with surrounding stranding in the fat as detailed above. Smaller hematomas within the left gluteus tarah muscle as well as in the posterior subcutaneous soft tissues at the level of the sacrum. ASA held in the hospital. Follow up with PCP or Card to find out when its ok to resume ASA. Pain medications as needed.  Secondary Diagnosis:	Anemia, unspecified type  Goal:	management  Instructions for follow-up, activity and diet:	Continue taking Iron tabs as prescribed 3xdaily. H/H 9.1/ 28.2 as of 4/3/17, stable. Follow up with PCP for further management and treatment.  Secondary Diagnosis:	Coronary artery disease involving coronary bypass graft of native heart without angina pectoris  Instructions for follow-up, activity and diet:	Continue taking medications as prescribed. Follow up with PCP within a week for further management and treatment and when to resume ASA.  Secondary Diagnosis:	Benign prostatic hyperplasia with lower urinary tract symptoms, unspecified morphology  Instructions for follow-up, activity and diet:	Continue taking Flomax as prescribed.

## 2017-04-01 NOTE — DISCHARGE NOTE ADULT - CARE PROVIDER_API CALL
Jose Cruz Schwartz (PIPER), Hematology; Medical Oncology  [address] [facility] [address] [place] [address]  Phone: [phone]  Fax: [phone] Jose Cruz Schwartz (PIPER), Hematology; Medical Oncology  [address] [place] [address]  Phone: [phone]  Fax: [phone]    Dr Diop,   Phone: (   )    -  Fax: (   )    -

## 2017-04-01 NOTE — DISCHARGE NOTE ADULT - SECONDARY DIAGNOSIS.
Anemia, unspecified type Coronary artery disease involving coronary bypass graft of native heart without angina pectoris Benign prostatic hyperplasia with lower urinary tract symptoms, unspecified morphology

## 2017-04-02 LAB
BUN SERPL-MCNC: 27 MG/DL — HIGH (ref 7–23)
CALCIUM SERPL-MCNC: 8.6 MG/DL — SIGNIFICANT CHANGE UP (ref 8.4–10.5)
CHLORIDE SERPL-SCNC: 99 MMOL/L — SIGNIFICANT CHANGE UP (ref 98–107)
CO2 SERPL-SCNC: 21 MMOL/L — LOW (ref 22–31)
CREAT SERPL-MCNC: 1.01 MG/DL — SIGNIFICANT CHANGE UP (ref 0.5–1.3)
GLUCOSE SERPL-MCNC: 122 MG/DL — HIGH (ref 70–99)
HCT VFR BLD CALC: 28.2 % — LOW (ref 39–50)
HGB BLD-MCNC: 9 G/DL — LOW (ref 13–17)
MCHC RBC-ENTMCNC: 23.1 PG — LOW (ref 27–34)
MCHC RBC-ENTMCNC: 31.9 % — LOW (ref 32–36)
MCV RBC AUTO: 72.3 FL — LOW (ref 80–100)
PLATELET # BLD AUTO: 257 K/UL — SIGNIFICANT CHANGE UP (ref 150–400)
PMV BLD: 8.9 FL — SIGNIFICANT CHANGE UP (ref 7–13)
POTASSIUM SERPL-MCNC: 4.3 MMOL/L — SIGNIFICANT CHANGE UP (ref 3.5–5.3)
POTASSIUM SERPL-SCNC: 4.3 MMOL/L — SIGNIFICANT CHANGE UP (ref 3.5–5.3)
RBC # BLD: 3.9 M/UL — LOW (ref 4.2–5.8)
RBC # FLD: 22.9 % — HIGH (ref 10.3–14.5)
SODIUM SERPL-SCNC: 134 MMOL/L — LOW (ref 135–145)
WBC # BLD: 5.89 K/UL — SIGNIFICANT CHANGE UP (ref 3.8–10.5)
WBC # FLD AUTO: 5.89 K/UL — SIGNIFICANT CHANGE UP (ref 3.8–10.5)

## 2017-04-02 RX ADMIN — Medication 100 MILLIGRAM(S): at 05:26

## 2017-04-02 RX ADMIN — Medication 1 MILLIGRAM(S): at 12:18

## 2017-04-02 RX ADMIN — Medication 325 MILLIGRAM(S): at 12:18

## 2017-04-02 RX ADMIN — Medication 325 MILLIGRAM(S): at 17:36

## 2017-04-02 RX ADMIN — TAMSULOSIN HYDROCHLORIDE 0.4 MILLIGRAM(S): 0.4 CAPSULE ORAL at 21:32

## 2017-04-02 RX ADMIN — BUDESONIDE AND FORMOTEROL FUMARATE DIHYDRATE 1 PUFF(S): 160; 4.5 AEROSOL RESPIRATORY (INHALATION) at 08:46

## 2017-04-02 RX ADMIN — PANTOPRAZOLE SODIUM 40 MILLIGRAM(S): 20 TABLET, DELAYED RELEASE ORAL at 05:26

## 2017-04-02 RX ADMIN — BUDESONIDE AND FORMOTEROL FUMARATE DIHYDRATE 1 PUFF(S): 160; 4.5 AEROSOL RESPIRATORY (INHALATION) at 21:33

## 2017-04-02 RX ADMIN — SIMVASTATIN 20 MILLIGRAM(S): 20 TABLET, FILM COATED ORAL at 21:32

## 2017-04-02 RX ADMIN — Medication 325 MILLIGRAM(S): at 08:46

## 2017-04-02 NOTE — PROVIDER CONTACT NOTE (OTHER) - SITUATION
pt re-admitted this admission, staples placed on 3/18/17 on right posterior head at last admission status post fall.

## 2017-04-02 NOTE — PROVIDER CONTACT NOTE (OTHER) - BACKGROUND
per daughter staples placed on 3/18/17; would like removed before possibly transferred to rehab tomorrow.

## 2017-04-03 VITALS
TEMPERATURE: 98 F | OXYGEN SATURATION: 100 % | SYSTOLIC BLOOD PRESSURE: 148 MMHG | HEART RATE: 80 BPM | RESPIRATION RATE: 18 BRPM | DIASTOLIC BLOOD PRESSURE: 68 MMHG

## 2017-04-03 LAB
BUN SERPL-MCNC: 30 MG/DL — HIGH (ref 7–23)
CALCIUM SERPL-MCNC: 8.8 MG/DL — SIGNIFICANT CHANGE UP (ref 8.4–10.5)
CHLORIDE SERPL-SCNC: 103 MMOL/L — SIGNIFICANT CHANGE UP (ref 98–107)
CO2 SERPL-SCNC: 22 MMOL/L — SIGNIFICANT CHANGE UP (ref 22–31)
CREAT SERPL-MCNC: 1.09 MG/DL — SIGNIFICANT CHANGE UP (ref 0.5–1.3)
GLUCOSE SERPL-MCNC: 149 MG/DL — HIGH (ref 70–99)
HCT VFR BLD CALC: 28.2 % — LOW (ref 39–50)
HGB BLD-MCNC: 9.1 G/DL — LOW (ref 13–17)
MCHC RBC-ENTMCNC: 23.6 PG — LOW (ref 27–34)
MCHC RBC-ENTMCNC: 32.3 % — SIGNIFICANT CHANGE UP (ref 32–36)
MCV RBC AUTO: 73.1 FL — LOW (ref 80–100)
PLATELET # BLD AUTO: 269 K/UL — SIGNIFICANT CHANGE UP (ref 150–400)
PMV BLD: 9.5 FL — SIGNIFICANT CHANGE UP (ref 7–13)
POTASSIUM SERPL-MCNC: 4.4 MMOL/L — SIGNIFICANT CHANGE UP (ref 3.5–5.3)
POTASSIUM SERPL-SCNC: 4.4 MMOL/L — SIGNIFICANT CHANGE UP (ref 3.5–5.3)
RBC # BLD: 3.86 M/UL — LOW (ref 4.2–5.8)
RBC # FLD: 22.4 % — HIGH (ref 10.3–14.5)
SODIUM SERPL-SCNC: 140 MMOL/L — SIGNIFICANT CHANGE UP (ref 135–145)
WBC # BLD: 5.23 K/UL — SIGNIFICANT CHANGE UP (ref 3.8–10.5)
WBC # FLD AUTO: 5.23 K/UL — SIGNIFICANT CHANGE UP (ref 3.8–10.5)

## 2017-04-03 RX ORDER — ASPIRIN/CALCIUM CARB/MAGNESIUM 324 MG
1 TABLET ORAL
Qty: 0 | Refills: 0 | COMMUNITY

## 2017-04-03 RX ORDER — FOLIC ACID 0.8 MG
1 TABLET ORAL
Qty: 0 | Refills: 0 | COMMUNITY
Start: 2017-04-03

## 2017-04-03 RX ORDER — OXYCODONE HYDROCHLORIDE 5 MG/1
1 TABLET ORAL
Qty: 0 | Refills: 0 | COMMUNITY
Start: 2017-04-03

## 2017-04-03 RX ORDER — ACETAMINOPHEN 500 MG
2 TABLET ORAL
Qty: 0 | Refills: 0 | COMMUNITY
Start: 2017-04-03

## 2017-04-03 RX ADMIN — Medication 1 MILLIGRAM(S): at 12:44

## 2017-04-03 RX ADMIN — BUDESONIDE AND FORMOTEROL FUMARATE DIHYDRATE 1 PUFF(S): 160; 4.5 AEROSOL RESPIRATORY (INHALATION) at 08:58

## 2017-04-03 RX ADMIN — Medication 650 MILLIGRAM(S): at 06:01

## 2017-04-03 RX ADMIN — PANTOPRAZOLE SODIUM 40 MILLIGRAM(S): 20 TABLET, DELAYED RELEASE ORAL at 06:00

## 2017-04-03 RX ADMIN — Medication 100 MILLIGRAM(S): at 06:01

## 2017-04-03 RX ADMIN — Medication 325 MILLIGRAM(S): at 08:58

## 2017-04-03 RX ADMIN — Medication 325 MILLIGRAM(S): at 12:44

## 2017-04-03 RX ADMIN — Medication 650 MILLIGRAM(S): at 15:46

## 2017-04-03 RX ADMIN — Medication 650 MILLIGRAM(S): at 06:43

## 2017-04-03 RX ADMIN — Medication 650 MILLIGRAM(S): at 16:46

## 2017-06-01 ENCOUNTER — INPATIENT (INPATIENT)
Facility: HOSPITAL | Age: 82
LOS: 4 days | Discharge: HOME HEALTH | DRG: 194 | End: 2017-06-06
Attending: HOSPITALIST | Admitting: HOSPITALIST
Payer: COMMERCIAL

## 2017-06-01 VITALS
RESPIRATION RATE: 18 BRPM | SYSTOLIC BLOOD PRESSURE: 155 MMHG | HEART RATE: 91 BPM | DIASTOLIC BLOOD PRESSURE: 70 MMHG | OXYGEN SATURATION: 98 % | TEMPERATURE: 100 F

## 2017-06-01 DIAGNOSIS — J18.9 PNEUMONIA, UNSPECIFIED ORGANISM: ICD-10-CM

## 2017-06-01 DIAGNOSIS — I50.32 CHRONIC DIASTOLIC (CONGESTIVE) HEART FAILURE: ICD-10-CM

## 2017-06-01 DIAGNOSIS — N17.9 ACUTE KIDNEY FAILURE, UNSPECIFIED: ICD-10-CM

## 2017-06-01 DIAGNOSIS — D56.9 THALASSEMIA, UNSPECIFIED: ICD-10-CM

## 2017-06-01 DIAGNOSIS — E80.6 OTHER DISORDERS OF BILIRUBIN METABOLISM: ICD-10-CM

## 2017-06-01 DIAGNOSIS — I25.810 ATHEROSCLEROSIS OF CORONARY ARTERY BYPASS GRAFT(S) WITHOUT ANGINA PECTORIS: ICD-10-CM

## 2017-06-01 DIAGNOSIS — I10 ESSENTIAL (PRIMARY) HYPERTENSION: ICD-10-CM

## 2017-06-01 DIAGNOSIS — Z29.9 ENCOUNTER FOR PROPHYLACTIC MEASURES, UNSPECIFIED: ICD-10-CM

## 2017-06-01 LAB
ALBUMIN SERPL ELPH-MCNC: 3.4 G/DL — SIGNIFICANT CHANGE UP (ref 3.3–5)
ALP SERPL-CCNC: 78 U/L — SIGNIFICANT CHANGE UP (ref 40–120)
ALT FLD-CCNC: 52 U/L RC — HIGH (ref 10–45)
ANION GAP SERPL CALC-SCNC: 15 MMOL/L — SIGNIFICANT CHANGE UP (ref 5–17)
AST SERPL-CCNC: 56 U/L — HIGH (ref 10–40)
BILIRUB SERPL-MCNC: 2.1 MG/DL — HIGH (ref 0.2–1.2)
BUN SERPL-MCNC: 19 MG/DL — SIGNIFICANT CHANGE UP (ref 7–23)
CALCIUM SERPL-MCNC: 8.8 MG/DL — SIGNIFICANT CHANGE UP (ref 8.4–10.5)
CHLORIDE SERPL-SCNC: 97 MMOL/L — SIGNIFICANT CHANGE UP (ref 96–108)
CHLORIDE UR-SCNC: 74 MMOL/L — SIGNIFICANT CHANGE UP
CO2 SERPL-SCNC: 22 MMOL/L — SIGNIFICANT CHANGE UP (ref 22–31)
CREAT ?TM UR-MCNC: 44 MG/DL — SIGNIFICANT CHANGE UP
CREAT SERPL-MCNC: 1.32 MG/DL — HIGH (ref 0.5–1.3)
GAS PNL BLDV: SIGNIFICANT CHANGE UP
GLUCOSE SERPL-MCNC: 127 MG/DL — HIGH (ref 70–99)
HCT VFR BLD CALC: 30.5 % — LOW (ref 39–50)
HGB BLD-MCNC: 9.9 G/DL — LOW (ref 13–17)
MCHC RBC-ENTMCNC: 23.1 PG — LOW (ref 27–34)
MCHC RBC-ENTMCNC: 32.6 GM/DL — SIGNIFICANT CHANGE UP (ref 32–36)
MCV RBC AUTO: 70.8 FL — LOW (ref 80–100)
OSMOLALITY UR: 362 MOS/KG — SIGNIFICANT CHANGE UP (ref 300–900)
PLATELET # BLD AUTO: 155 K/UL — SIGNIFICANT CHANGE UP (ref 150–400)
POTASSIUM SERPL-MCNC: 4.3 MMOL/L — SIGNIFICANT CHANGE UP (ref 3.5–5.3)
POTASSIUM SERPL-SCNC: 4.3 MMOL/L — SIGNIFICANT CHANGE UP (ref 3.5–5.3)
PROT SERPL-MCNC: 8.1 G/DL — SIGNIFICANT CHANGE UP (ref 6–8.3)
RAPID RVP RESULT: SIGNIFICANT CHANGE UP
RBC # BLD: 4.31 M/UL — SIGNIFICANT CHANGE UP (ref 4.2–5.8)
RBC # FLD: 15.2 % — HIGH (ref 10.3–14.5)
SODIUM SERPL-SCNC: 134 MMOL/L — LOW (ref 135–145)
SODIUM UR-SCNC: 82 MMOL/L — SIGNIFICANT CHANGE UP
WBC # BLD: 8.2 K/UL — SIGNIFICANT CHANGE UP (ref 3.8–10.5)
WBC # FLD AUTO: 8.2 K/UL — SIGNIFICANT CHANGE UP (ref 3.8–10.5)

## 2017-06-01 PROCEDURE — 93010 ELECTROCARDIOGRAM REPORT: CPT

## 2017-06-01 PROCEDURE — 99285 EMERGENCY DEPT VISIT HI MDM: CPT | Mod: 25

## 2017-06-01 PROCEDURE — 71010: CPT | Mod: 26

## 2017-06-01 PROCEDURE — 99223 1ST HOSP IP/OBS HIGH 75: CPT | Mod: GC

## 2017-06-01 RX ORDER — VANCOMYCIN HCL 1 G
1000 VIAL (EA) INTRAVENOUS ONCE
Qty: 0 | Refills: 0 | Status: COMPLETED | OUTPATIENT
Start: 2017-06-01 | End: 2017-06-01

## 2017-06-01 RX ORDER — ACETAMINOPHEN 500 MG
650 TABLET ORAL EVERY 6 HOURS
Qty: 0 | Refills: 0 | Status: DISCONTINUED | OUTPATIENT
Start: 2017-06-01 | End: 2017-06-06

## 2017-06-01 RX ORDER — ASPIRIN/CALCIUM CARB/MAGNESIUM 324 MG
81 TABLET ORAL DAILY
Qty: 0 | Refills: 0 | Status: DISCONTINUED | OUTPATIENT
Start: 2017-06-01 | End: 2017-06-06

## 2017-06-01 RX ORDER — FOLIC ACID 0.8 MG
1 TABLET ORAL DAILY
Qty: 0 | Refills: 0 | Status: DISCONTINUED | OUTPATIENT
Start: 2017-06-01 | End: 2017-06-06

## 2017-06-01 RX ORDER — FUROSEMIDE 40 MG
20 TABLET ORAL ONCE
Qty: 0 | Refills: 0 | Status: COMPLETED | OUTPATIENT
Start: 2017-06-01 | End: 2017-06-01

## 2017-06-01 RX ORDER — ACETAMINOPHEN 500 MG
975 TABLET ORAL ONCE
Qty: 0 | Refills: 0 | Status: COMPLETED | OUTPATIENT
Start: 2017-06-01 | End: 2017-06-01

## 2017-06-01 RX ORDER — AZITHROMYCIN 500 MG/1
500 TABLET, FILM COATED ORAL ONCE
Qty: 0 | Refills: 0 | Status: COMPLETED | OUTPATIENT
Start: 2017-06-01 | End: 2017-06-01

## 2017-06-01 RX ORDER — METOPROLOL TARTRATE 50 MG
25 TABLET ORAL EVERY 12 HOURS
Qty: 0 | Refills: 0 | Status: DISCONTINUED | OUTPATIENT
Start: 2017-06-02 | End: 2017-06-06

## 2017-06-01 RX ORDER — PIPERACILLIN AND TAZOBACTAM 4; .5 G/20ML; G/20ML
3.38 INJECTION, POWDER, LYOPHILIZED, FOR SOLUTION INTRAVENOUS EVERY 8 HOURS
Qty: 0 | Refills: 0 | Status: DISCONTINUED | OUTPATIENT
Start: 2017-06-01 | End: 2017-06-02

## 2017-06-01 RX ORDER — HEPARIN SODIUM 5000 [USP'U]/ML
5000 INJECTION INTRAVENOUS; SUBCUTANEOUS EVERY 8 HOURS
Qty: 0 | Refills: 0 | Status: DISCONTINUED | OUTPATIENT
Start: 2017-06-01 | End: 2017-06-06

## 2017-06-01 RX ORDER — SODIUM CHLORIDE 9 MG/ML
500 INJECTION INTRAMUSCULAR; INTRAVENOUS; SUBCUTANEOUS ONCE
Qty: 0 | Refills: 0 | Status: COMPLETED | OUTPATIENT
Start: 2017-06-01 | End: 2017-06-01

## 2017-06-01 RX ORDER — PIPERACILLIN AND TAZOBACTAM 4; .5 G/20ML; G/20ML
3.38 INJECTION, POWDER, LYOPHILIZED, FOR SOLUTION INTRAVENOUS ONCE
Qty: 0 | Refills: 0 | Status: COMPLETED | OUTPATIENT
Start: 2017-06-01 | End: 2017-06-01

## 2017-06-01 RX ADMIN — PIPERACILLIN AND TAZOBACTAM 200 GRAM(S): 4; .5 INJECTION, POWDER, LYOPHILIZED, FOR SOLUTION INTRAVENOUS at 15:18

## 2017-06-01 RX ADMIN — Medication 250 MILLIGRAM(S): at 17:26

## 2017-06-01 RX ADMIN — Medication 975 MILLIGRAM(S): at 12:53

## 2017-06-01 RX ADMIN — Medication 20 MILLIGRAM(S): at 22:05

## 2017-06-01 RX ADMIN — PIPERACILLIN AND TAZOBACTAM 25 GRAM(S): 4; .5 INJECTION, POWDER, LYOPHILIZED, FOR SOLUTION INTRAVENOUS at 21:48

## 2017-06-01 RX ADMIN — Medication 25 MILLIGRAM(S): at 21:49

## 2017-06-01 RX ADMIN — HEPARIN SODIUM 5000 UNIT(S): 5000 INJECTION INTRAVENOUS; SUBCUTANEOUS at 21:49

## 2017-06-01 RX ADMIN — AZITHROMYCIN 250 MILLIGRAM(S): 500 TABLET, FILM COATED ORAL at 15:53

## 2017-06-01 RX ADMIN — SODIUM CHLORIDE 500 MILLILITER(S): 9 INJECTION INTRAMUSCULAR; INTRAVENOUS; SUBCUTANEOUS at 12:53

## 2017-06-01 NOTE — H&P ADULT. - PROBLEM SELECTOR PLAN 7
HSQ no chest pain, not tachycardic; EKG reviewed, no changes as compared to prior   c/w Zocor 20  hold toprol for now given acute infection, will restart if BP remains stable

## 2017-06-01 NOTE — H&P ADULT. - ATTENDING COMMENTS
Vitals, labs, ekg, imaging reviewed. Pt c hx as above p/w cough and fever with episodes of confusion. No SOB/CP/N/V/abd pain.  PE: NAD; RLL crackles>LLL crackles; mild JVD; neg polo    Given recnt hospitalizations/rehb will cont Vanc/Zosyn and de-escalate tomorrow if quick improvement in patients condition  check blood cx, urine legionella    Acute on chronic diastolic heart failure- mild. Lasix 20 mg po x 1 as pt also febrile with infection. re-evaluate for more Lasix    d/w pt/family

## 2017-06-01 NOTE — ED PROVIDER NOTE - MEDICAL DECISION MAKING DETAILS
1)Rectal temp 2)Labs 3)CXR 4)EKG 5)Flu swab 6)Treatment as needed for flu or PNA with likely admission

## 2017-06-01 NOTE — H&P ADULT. - PROBLEM SELECTOR PLAN 4
BP well controlled at this time; will hold off BP well controlled at this time; will hold toprol for tonight given acute infection; will restart if BP remains stable overnight Bilirubin mildly elevated, likely in setting of acute infection; low suspicion for GB pathology given normal alkP; also with mild transaminitis, new from previous admission   - will trend CMP for now given low suspicion for GB or liver pathology at this time (no jaundice, no RUQ abdominal pain, levels <3x ULN)   - will hold statin  - if labs do not return to baseline, will check RUQ-US

## 2017-06-01 NOTE — H&P ADULT. - PROBLEM SELECTOR PLAN 2
pt with baseline Cr 1.10, now with Cr 1.5, likely in setting of acute infectious process  - s/p 500cc of IVF in ED  - will place on gentle IVF overnight, recheck BMP in AM  - monitor I/Os  - check urine studies pt with baseline Cr ~1, now with Cr 1.32, likely in setting of acute infectious process  - s/p 500cc of IVF in ED  - will place on gentle IVF overnight, recheck BMP in AM  - monitor I/Os  - check urine studies pt with baseline Cr ~1, now with Cr 1.32, likely in setting of acute infectious process  - s/p 500cc of IVF in ED  -recheck BMP in AM  - monitor I/Os  - check urine studies

## 2017-06-01 NOTE — H&P ADULT. - LAB RESULTS AND INTERPRETATION
Labs notable for normal WBC Hb 9.9 (baseline), hyponatremia to 134, Cr 1.32 (baseline 1-1.10). RVP negative

## 2017-06-01 NOTE — PATIENT PROFILE ADULT. - NS TRANSFER PATIENT BELONGINGS
Dr. Edwardo Cooley please see patient's message below.
Dr. Ozzy Jackson please see patient's response. She cannot split dose as she takes gel caps.
From: Em Nelson  To: Leana Atwood MD  Sent: 2/15/2017 2:30 PM CST  Subject: Non-Urgent Medical Question    Roger Marie. Edison Kaplan I'm resending this message that I sent on Feb. 7th as I never got a response. ... Roger Marie.  The gynecologist does not
Patient informed via mychart of dose change below. Will await response to see if refills needed.
Right, tirosint. Ok to change to 50mcg tablet once per week and continue 75mcg 5 days per week. Thanks. Repeat TSH, FT4, FT3 in 6 weeks.
Cell Phone/PDA (specify)/Wrist Watch/Clothing

## 2017-06-01 NOTE — H&P ADULT. - PROBLEM SELECTOR PROBLEM 7
Need for prophylactic measure Coronary artery disease involving coronary bypass graft of native heart without angina pectoris

## 2017-06-01 NOTE — ED PROVIDER NOTE - CONSTITUTIONAL, MLM
normal... Well appearing, well nourished, awake, alert, oriented to person, place, time/situation and in no apparent distress. Low grade temp, otherwise vitals normal

## 2017-06-01 NOTE — H&P ADULT. - PROBLEM SELECTOR PROBLEM 3
Coronary artery disease involving coronary bypass graft of native heart without angina pectoris Chronic diastolic heart failure

## 2017-06-01 NOTE — H&P ADULT. - PROBLEM SELECTOR PLAN 5
Pt with thalassemia and chronic anemia, Hb currently at baseline; no s/s of bleeding   will c/s heme for possible procrit BP well controlled at this time; will hold toprol for tonight given acute infection; will restart if BP remains stable overnight

## 2017-06-01 NOTE — ED PROVIDER NOTE - NS ED MD SCRIBE ATTENDING SCRIBE SECTIONS
HISTORY OF PRESENT ILLNESS/REVIEW OF SYSTEMS/PHYSICAL EXAM/INTAKE ASSESSMENT/SCREENINGS/VITAL SIGNS( Pullset)/PAST MEDICAL/SURGICAL/SOCIAL HISTORY/HIV

## 2017-06-01 NOTE — ED PROVIDER NOTE - ENMT, MLM
Airway patent, Nasal mucosa clear. Mouth with normal mucosa. Oropharynx clear. No conjunctival pallor

## 2017-06-01 NOTE — H&P ADULT. - HISTORY OF PRESENT ILLNESS
83yoM with PMH of CAD s/p MI with CABG (2004), thalassemia (baseline Hb ~10), HTN, BPH, recent hospitalization for fall c/b L psoas muscle hemtaoma presenting with 5 days of cough and fever x 1 day. History obtained from patient, with family at bedside.     Pt states since 4 days PTA, he has been having a non productive cough; the cough has not worsened, but has not improved; he denies any associated SOB. On the day the cough started, he endorses feeling chills, denies fevers. Per pt, he has been having normal PO intake since then; but does endorse feeling weaker and more tired. Per family at bedside, pt appeared slightly confused yesterday at his sisters house (confusing the way to the bathroom; sitting with food in front of him but not eating); per son, pt felt hot the night PTA, and temperature taken showed ~102F. Family took him to urgent care on the morning of admission, where he was again found to be febrile, was told he had an infection and to come to the ED.   Pt otherwise denies any headaches, blurry vision, rhinorrhea, SOB, abdominal pain, nausea/vomiting, dysuria, rash; denies sick contacts or recent travel.    Per family, pt currently at baseline mental status.    Pt was recently hospitalized at [facility] in mid March; initially for FluB infection; day after discharge, pt syncopized, presumed orthostatics 2/2 dehydration. Shortly after discharge from that hospitalization, returned for L hip pain and found to have L psoas muscle hematoma. Had TTE in 3/2017 showing stage II diastolic dysfunction mild-mod MR and normal LV function.     Pt lives at home with his son since the above mentioned hospitalizations; is independent with all ADLs. Pt refuses to have any contrast studies due to concern for kidney injury.     ED course:  T97.4-101.4 HR67-91  -382/53-70  16-18RR 96-98%RA  Labs notable for normal WBC Hb 9.9 (baseline), hyponatremia to 134, Cr 1.32 (baseline 1-1.10). RVP negative   CXR showing vascular congestion   s/p vanco/zosyn/azithromycin; 500cc bolus; tylenol

## 2017-06-01 NOTE — H&P ADULT. - PROBLEM SELECTOR PLAN 1
pt with fever in ED, cough, AMS per history, +fatigue; despite normal CXR, high clinical suspicion for pneumonia at this time  - will treat with ceftriaxone and azithromycin  - gentle IVF given HFpEF  - will hold off on further imaging at this time; if clinical picture does not improve, monse obtain CT chest  - sputum cx (if possible)  - blood cultures sent, will follow up   - vitals q8h   - anti-tussives for supportive care  - supplemental oxygen if needed for saturation <92% pt with fever in ED, cough, AMS per history, +fatigue; despite normal CXR, high clinical suspicion for pneumonia at this time  - will treat with vanco/zosyn/doxycycline (doxycycline 2/2 RBB); if improvement noted, will change to ceftriaxone    - gentle IVF given HFpEF  - will hold off on further imaging at this time; if clinical picture does not improve, monse obtain CT chest  - sputum cx (if possible)  - blood cultures sent, will follow up   - vitals q8h   - anti-tussives for supportive care  - supplemental oxygen if needed for saturation <92% pt with fever in ED, cough, AMS per history, +fatigue; despite normal CXR, high clinical suspicion for pneumonia at this time  - will treat with vanco/zosyn/doxycycline (doxycycline 2/2 RBB); if improvement noted, will change to ceftriaxone/DOXY  - will hold off on further imaging at this time; if clinical picture does not improve, monse obtain CT chest  - sputum cx (if possible)  - blood cultures sent, will follow up   - vitals q8h   - anti-tussives for supportive care  - supplemental oxygen if needed for saturation <92%

## 2017-06-01 NOTE — H&P ADULT. - NEUROLOGICAL DETAILS
responds to verbal commands/alert and oriented x 3 cranial nerves intact/alert and oriented x 3/responds to verbal commands

## 2017-06-01 NOTE — H&P ADULT. - PROBLEM SELECTOR PLAN 3
no chest pain, not tachycardic; EKG reviewed, no changes as compared to prior   c/w Zocor 20  hold toprol for now given acute infection, will restart if BP remains stable pt with TTE in 3/2017 with stage II diastolic heart function and mild-mod MR; does not appear volume overloaded at this time, no LE edema, no SOB, no hypoxia   - c/t monitor for symptoms

## 2017-06-01 NOTE — H&P ADULT. - ASSESSMENT
83yoM with PMH of CAD s/p MI with CABG (2004), thalassemia (baseline Hb ~10), HTN, BPH, recent hospitalization for fall c/b L psoas muscle hematoma presenting with 5 days of cough and fever x 1 day; febrile in ED with mild tachycardia, persistent cough - a/w pneumonia

## 2017-06-01 NOTE — ED PROVIDER NOTE - OBJECTIVE STATEMENT
83 year old male with PMHx of HTN, HLD, CAD, with hx of CABG, on ASA 81mg, NKDA, presents with fevers, chills, and cough. No myalgias, arthralgias. No GI, , ENT symptoms, CP, SOB or GI bleeding. Has been eating and drinking normally. No recent travel, sick contacts or antibiotics use. No focal sensory, motor, visual or speech changes. Pt presented to Urgent Care today and was told he was febrile to 101F and has "infection in b/l lungs".   Dr. Hoang ([facility])

## 2017-06-01 NOTE — H&P ADULT. - PROBLEM SELECTOR PLAN 6
Bilirubin mildly elevated, likely in setting of acute infection; low suspicion for GB pathology given normal alkP; also with mild transaminitis, new from previous admission   - will trend CMP for now given low suspicion for GB or liver pathology at this time (no jaundice, no RUQ abdominal pain, levels <3x ULN)   - will hold statin  - if labs do not return to baseline, will check RUQ-US Pt with thalassemia and chronic anemia, Hb currently at baseline; no s/s of bleeding   will c/s heme for possible procrit

## 2017-06-02 LAB
ALBUMIN SERPL ELPH-MCNC: 2.8 G/DL — LOW (ref 3.3–5)
ALP SERPL-CCNC: 77 U/L — SIGNIFICANT CHANGE UP (ref 40–120)
ALT FLD-CCNC: 44 U/L RC — SIGNIFICANT CHANGE UP (ref 10–45)
ANION GAP SERPL CALC-SCNC: 12 MMOL/L — SIGNIFICANT CHANGE UP (ref 5–17)
AST SERPL-CCNC: 45 U/L — HIGH (ref 10–40)
BASOPHILS # BLD AUTO: 0 K/UL — SIGNIFICANT CHANGE UP (ref 0–0.2)
BASOPHILS NFR BLD AUTO: 0.6 % — SIGNIFICANT CHANGE UP (ref 0–2)
BILIRUB DIRECT SERPL-MCNC: 0.6 MG/DL — HIGH (ref 0–0.2)
BILIRUB SERPL-MCNC: 1.6 MG/DL — HIGH (ref 0.2–1.2)
BUN SERPL-MCNC: 17 MG/DL — SIGNIFICANT CHANGE UP (ref 7–23)
CALCIUM SERPL-MCNC: 8.3 MG/DL — LOW (ref 8.4–10.5)
CHLORIDE SERPL-SCNC: 100 MMOL/L — SIGNIFICANT CHANGE UP (ref 96–108)
CO2 SERPL-SCNC: 22 MMOL/L — SIGNIFICANT CHANGE UP (ref 22–31)
CREAT SERPL-MCNC: 1.31 MG/DL — HIGH (ref 0.5–1.3)
EOSINOPHIL # BLD AUTO: 0.2 K/UL — SIGNIFICANT CHANGE UP (ref 0–0.5)
EOSINOPHIL NFR BLD AUTO: 2.5 % — SIGNIFICANT CHANGE UP (ref 0–6)
GLUCOSE SERPL-MCNC: 110 MG/DL — HIGH (ref 70–99)
HCT VFR BLD CALC: 28.3 % — LOW (ref 39–50)
HGB BLD-MCNC: 9 G/DL — LOW (ref 13–17)
LEGIONELLA AG UR QL: NEGATIVE — SIGNIFICANT CHANGE UP
LYMPHOCYTES # BLD AUTO: 1.2 K/UL — SIGNIFICANT CHANGE UP (ref 1–3.3)
LYMPHOCYTES # BLD AUTO: 15.6 % — SIGNIFICANT CHANGE UP (ref 13–44)
MAGNESIUM SERPL-MCNC: 1.9 MG/DL — SIGNIFICANT CHANGE UP (ref 1.6–2.6)
MCHC RBC-ENTMCNC: 22.6 PG — LOW (ref 27–34)
MCHC RBC-ENTMCNC: 31.8 GM/DL — LOW (ref 32–36)
MCV RBC AUTO: 71 FL — LOW (ref 80–100)
MONOCYTES # BLD AUTO: 0.9 K/UL — SIGNIFICANT CHANGE UP (ref 0–0.9)
MONOCYTES NFR BLD AUTO: 11.1 % — SIGNIFICANT CHANGE UP (ref 2–14)
NEUTROPHILS # BLD AUTO: 5.5 K/UL — SIGNIFICANT CHANGE UP (ref 1.8–7.4)
NEUTROPHILS NFR BLD AUTO: 70.2 % — SIGNIFICANT CHANGE UP (ref 43–77)
OSMOLALITY SERPL: 281 MOS/KG — SIGNIFICANT CHANGE UP (ref 275–300)
PHOSPHATE SERPL-MCNC: 2.6 MG/DL — SIGNIFICANT CHANGE UP (ref 2.5–4.5)
PLATELET # BLD AUTO: 145 K/UL — LOW (ref 150–400)
POTASSIUM SERPL-MCNC: 4.1 MMOL/L — SIGNIFICANT CHANGE UP (ref 3.5–5.3)
POTASSIUM SERPL-SCNC: 4.1 MMOL/L — SIGNIFICANT CHANGE UP (ref 3.5–5.3)
PROT SERPL-MCNC: 7 G/DL — SIGNIFICANT CHANGE UP (ref 6–8.3)
RBC # BLD: 3.99 M/UL — LOW (ref 4.2–5.8)
RBC # FLD: 14.9 % — HIGH (ref 10.3–14.5)
SODIUM SERPL-SCNC: 134 MMOL/L — LOW (ref 135–145)
VANCOMYCIN TROUGH SERPL-MCNC: 7 UG/ML — LOW (ref 10–20)
WBC # BLD: 7.9 K/UL — SIGNIFICANT CHANGE UP (ref 3.8–10.5)
WBC # FLD AUTO: 7.9 K/UL — SIGNIFICANT CHANGE UP (ref 3.8–10.5)

## 2017-06-02 PROCEDURE — 99233 SBSQ HOSP IP/OBS HIGH 50: CPT | Mod: GC

## 2017-06-02 PROCEDURE — 76705 ECHO EXAM OF ABDOMEN: CPT | Mod: 26,RT

## 2017-06-02 RX ORDER — SIMETHICONE 80 MG/1
80 TABLET, CHEWABLE ORAL EVERY 6 HOURS
Qty: 0 | Refills: 0 | Status: DISCONTINUED | OUTPATIENT
Start: 2017-06-02 | End: 2017-06-06

## 2017-06-02 RX ORDER — CEFTRIAXONE 500 MG/1
1 INJECTION, POWDER, FOR SOLUTION INTRAMUSCULAR; INTRAVENOUS EVERY 24 HOURS
Qty: 0 | Refills: 0 | Status: DISCONTINUED | OUTPATIENT
Start: 2017-06-02 | End: 2017-06-03

## 2017-06-02 RX ORDER — VANCOMYCIN HCL 1 G
1000 VIAL (EA) INTRAVENOUS ONCE
Qty: 0 | Refills: 0 | Status: COMPLETED | OUTPATIENT
Start: 2017-06-02 | End: 2017-06-02

## 2017-06-02 RX ADMIN — SIMETHICONE 80 MILLIGRAM(S): 80 TABLET, CHEWABLE ORAL at 10:04

## 2017-06-02 RX ADMIN — Medication 110 MILLIGRAM(S): at 05:29

## 2017-06-02 RX ADMIN — CEFTRIAXONE 100 GRAM(S): 500 INJECTION, POWDER, FOR SOLUTION INTRAMUSCULAR; INTRAVENOUS at 22:02

## 2017-06-02 RX ADMIN — Medication 1 MILLIGRAM(S): at 13:00

## 2017-06-02 RX ADMIN — PIPERACILLIN AND TAZOBACTAM 25 GRAM(S): 4; .5 INJECTION, POWDER, LYOPHILIZED, FOR SOLUTION INTRAVENOUS at 14:05

## 2017-06-02 RX ADMIN — Medication 250 MILLIGRAM(S): at 13:00

## 2017-06-02 RX ADMIN — HEPARIN SODIUM 5000 UNIT(S): 5000 INJECTION INTRAVENOUS; SUBCUTANEOUS at 22:02

## 2017-06-02 RX ADMIN — HEPARIN SODIUM 5000 UNIT(S): 5000 INJECTION INTRAVENOUS; SUBCUTANEOUS at 13:00

## 2017-06-02 RX ADMIN — Medication 81 MILLIGRAM(S): at 13:00

## 2017-06-02 RX ADMIN — Medication 25 MILLIGRAM(S): at 17:16

## 2017-06-02 RX ADMIN — Medication 110 MILLIGRAM(S): at 17:16

## 2017-06-02 RX ADMIN — PIPERACILLIN AND TAZOBACTAM 25 GRAM(S): 4; .5 INJECTION, POWDER, LYOPHILIZED, FOR SOLUTION INTRAVENOUS at 06:53

## 2017-06-02 RX ADMIN — SIMETHICONE 80 MILLIGRAM(S): 80 TABLET, CHEWABLE ORAL at 17:14

## 2017-06-02 RX ADMIN — HEPARIN SODIUM 5000 UNIT(S): 5000 INJECTION INTRAVENOUS; SUBCUTANEOUS at 06:55

## 2017-06-03 LAB
ALBUMIN SERPL ELPH-MCNC: 2.6 G/DL — LOW (ref 3.3–5)
ALP SERPL-CCNC: 102 U/L — SIGNIFICANT CHANGE UP (ref 40–120)
ALT FLD-CCNC: 40 U/L RC — SIGNIFICANT CHANGE UP (ref 10–45)
ANION GAP SERPL CALC-SCNC: 14 MMOL/L — SIGNIFICANT CHANGE UP (ref 5–17)
AST SERPL-CCNC: 39 U/L — SIGNIFICANT CHANGE UP (ref 10–40)
BASOPHILS # BLD AUTO: 0 K/UL — SIGNIFICANT CHANGE UP (ref 0–0.2)
BASOPHILS NFR BLD AUTO: 0.5 % — SIGNIFICANT CHANGE UP (ref 0–2)
BILIRUB SERPL-MCNC: 0.9 MG/DL — SIGNIFICANT CHANGE UP (ref 0.2–1.2)
BLD GP AB SCN SERPL QL: NEGATIVE — SIGNIFICANT CHANGE UP
BUN SERPL-MCNC: 16 MG/DL — SIGNIFICANT CHANGE UP (ref 7–23)
CALCIUM SERPL-MCNC: 9 MG/DL — SIGNIFICANT CHANGE UP (ref 8.4–10.5)
CHLORIDE SERPL-SCNC: 99 MMOL/L — SIGNIFICANT CHANGE UP (ref 96–108)
CO2 SERPL-SCNC: 22 MMOL/L — SIGNIFICANT CHANGE UP (ref 22–31)
CREAT SERPL-MCNC: 0.93 MG/DL — SIGNIFICANT CHANGE UP (ref 0.5–1.3)
EOSINOPHIL # BLD AUTO: 0.4 K/UL — SIGNIFICANT CHANGE UP (ref 0–0.5)
EOSINOPHIL NFR BLD AUTO: 5.2 % — SIGNIFICANT CHANGE UP (ref 0–6)
GLUCOSE SERPL-MCNC: 115 MG/DL — HIGH (ref 70–99)
GRAM STN FLD: SIGNIFICANT CHANGE UP
HCT VFR BLD CALC: 28.5 % — LOW (ref 39–50)
HCT VFR BLD CALC: 31.5 % — LOW (ref 39–50)
HGB BLD-MCNC: 10.1 G/DL — LOW (ref 13–17)
HGB BLD-MCNC: 9.4 G/DL — LOW (ref 13–17)
LYMPHOCYTES # BLD AUTO: 1.1 K/UL — SIGNIFICANT CHANGE UP (ref 1–3.3)
LYMPHOCYTES # BLD AUTO: 13.6 % — SIGNIFICANT CHANGE UP (ref 13–44)
MAGNESIUM SERPL-MCNC: 1.7 MG/DL — SIGNIFICANT CHANGE UP (ref 1.6–2.6)
MCHC RBC-ENTMCNC: 22.7 PG — LOW (ref 27–34)
MCHC RBC-ENTMCNC: 23.1 PG — LOW (ref 27–34)
MCHC RBC-ENTMCNC: 32.2 GM/DL — SIGNIFICANT CHANGE UP (ref 32–36)
MCHC RBC-ENTMCNC: 32.8 GM/DL — SIGNIFICANT CHANGE UP (ref 32–36)
MCV RBC AUTO: 70.5 FL — LOW (ref 80–100)
MCV RBC AUTO: 70.7 FL — LOW (ref 80–100)
MONOCYTES # BLD AUTO: 0.9 K/UL — SIGNIFICANT CHANGE UP (ref 0–0.9)
MONOCYTES NFR BLD AUTO: 11 % — SIGNIFICANT CHANGE UP (ref 2–14)
NEUTROPHILS # BLD AUTO: 5.8 K/UL — SIGNIFICANT CHANGE UP (ref 1.8–7.4)
NEUTROPHILS NFR BLD AUTO: 69.7 % — SIGNIFICANT CHANGE UP (ref 43–77)
OB PNL STL: NEGATIVE — SIGNIFICANT CHANGE UP
PHOSPHATE SERPL-MCNC: 2.4 MG/DL — LOW (ref 2.5–4.5)
PLATELET # BLD AUTO: 171 K/UL — SIGNIFICANT CHANGE UP (ref 150–400)
PLATELET # BLD AUTO: 190 K/UL — SIGNIFICANT CHANGE UP (ref 150–400)
POTASSIUM SERPL-MCNC: 4 MMOL/L — SIGNIFICANT CHANGE UP (ref 3.5–5.3)
POTASSIUM SERPL-SCNC: 4 MMOL/L — SIGNIFICANT CHANGE UP (ref 3.5–5.3)
PROT SERPL-MCNC: 7.2 G/DL — SIGNIFICANT CHANGE UP (ref 6–8.3)
RBC # BLD: 4.04 M/UL — LOW (ref 4.2–5.8)
RBC # BLD: 4.45 M/UL — SIGNIFICANT CHANGE UP (ref 4.2–5.8)
RBC # FLD: 15 % — HIGH (ref 10.3–14.5)
RBC # FLD: 15.2 % — HIGH (ref 10.3–14.5)
RH IG SCN BLD-IMP: POSITIVE — SIGNIFICANT CHANGE UP
SODIUM SERPL-SCNC: 135 MMOL/L — SIGNIFICANT CHANGE UP (ref 135–145)
SPECIMEN SOURCE: SIGNIFICANT CHANGE UP
WBC # BLD: 8.1 K/UL — SIGNIFICANT CHANGE UP (ref 3.8–10.5)
WBC # BLD: 8.2 K/UL — SIGNIFICANT CHANGE UP (ref 3.8–10.5)
WBC # FLD AUTO: 8.1 K/UL — SIGNIFICANT CHANGE UP (ref 3.8–10.5)
WBC # FLD AUTO: 8.2 K/UL — SIGNIFICANT CHANGE UP (ref 3.8–10.5)

## 2017-06-03 PROCEDURE — 99233 SBSQ HOSP IP/OBS HIGH 50: CPT | Mod: GC

## 2017-06-03 RX ORDER — VANCOMYCIN HCL 1 G
1000 VIAL (EA) INTRAVENOUS ONCE
Qty: 0 | Refills: 0 | Status: COMPLETED | OUTPATIENT
Start: 2017-06-03 | End: 2017-06-03

## 2017-06-03 RX ORDER — PIPERACILLIN AND TAZOBACTAM 4; .5 G/20ML; G/20ML
3.38 INJECTION, POWDER, LYOPHILIZED, FOR SOLUTION INTRAVENOUS ONCE
Qty: 0 | Refills: 0 | Status: COMPLETED | OUTPATIENT
Start: 2017-06-03 | End: 2017-06-03

## 2017-06-03 RX ORDER — PIPERACILLIN AND TAZOBACTAM 4; .5 G/20ML; G/20ML
3.38 INJECTION, POWDER, LYOPHILIZED, FOR SOLUTION INTRAVENOUS EVERY 8 HOURS
Qty: 0 | Refills: 0 | Status: DISCONTINUED | OUTPATIENT
Start: 2017-06-03 | End: 2017-06-06

## 2017-06-03 RX ORDER — SODIUM,POTASSIUM PHOSPHATES 278-250MG
1 POWDER IN PACKET (EA) ORAL
Qty: 0 | Refills: 0 | Status: COMPLETED | OUTPATIENT
Start: 2017-06-03 | End: 2017-06-04

## 2017-06-03 RX ORDER — VANCOMYCIN HCL 1 G
VIAL (EA) INTRAVENOUS
Qty: 0 | Refills: 0 | Status: COMPLETED | OUTPATIENT
Start: 2017-06-03 | End: 2017-06-03

## 2017-06-03 RX ORDER — VANCOMYCIN HCL 1 G
750 VIAL (EA) INTRAVENOUS EVERY 12 HOURS
Qty: 0 | Refills: 0 | Status: DISCONTINUED | OUTPATIENT
Start: 2017-06-03 | End: 2017-06-03

## 2017-06-03 RX ORDER — MAGNESIUM OXIDE 400 MG ORAL TABLET 241.3 MG
400 TABLET ORAL
Qty: 0 | Refills: 0 | Status: COMPLETED | OUTPATIENT
Start: 2017-06-03 | End: 2017-06-04

## 2017-06-03 RX ORDER — IPRATROPIUM/ALBUTEROL SULFATE 18-103MCG
3 AEROSOL WITH ADAPTER (GRAM) INHALATION EVERY 6 HOURS
Qty: 0 | Refills: 0 | Status: DISCONTINUED | OUTPATIENT
Start: 2017-06-03 | End: 2017-06-06

## 2017-06-03 RX ORDER — PANTOPRAZOLE SODIUM 20 MG/1
40 TABLET, DELAYED RELEASE ORAL
Qty: 0 | Refills: 0 | Status: DISCONTINUED | OUTPATIENT
Start: 2017-06-03 | End: 2017-06-06

## 2017-06-03 RX ORDER — VANCOMYCIN HCL 1 G
750 VIAL (EA) INTRAVENOUS EVERY 12 HOURS
Qty: 0 | Refills: 0 | Status: DISCONTINUED | OUTPATIENT
Start: 2017-06-03 | End: 2017-06-05

## 2017-06-03 RX ADMIN — PIPERACILLIN AND TAZOBACTAM 200 GRAM(S): 4; .5 INJECTION, POWDER, LYOPHILIZED, FOR SOLUTION INTRAVENOUS at 12:01

## 2017-06-03 RX ADMIN — Medication 1 TABLET(S): at 22:58

## 2017-06-03 RX ADMIN — PANTOPRAZOLE SODIUM 40 MILLIGRAM(S): 20 TABLET, DELAYED RELEASE ORAL at 18:45

## 2017-06-03 RX ADMIN — Medication 25 MILLIGRAM(S): at 18:45

## 2017-06-03 RX ADMIN — Medication 25 MILLIGRAM(S): at 05:52

## 2017-06-03 RX ADMIN — HEPARIN SODIUM 5000 UNIT(S): 5000 INJECTION INTRAVENOUS; SUBCUTANEOUS at 14:41

## 2017-06-03 RX ADMIN — HEPARIN SODIUM 5000 UNIT(S): 5000 INJECTION INTRAVENOUS; SUBCUTANEOUS at 05:52

## 2017-06-03 RX ADMIN — PIPERACILLIN AND TAZOBACTAM 25 GRAM(S): 4; .5 INJECTION, POWDER, LYOPHILIZED, FOR SOLUTION INTRAVENOUS at 22:53

## 2017-06-03 RX ADMIN — Medication 110 MILLIGRAM(S): at 05:52

## 2017-06-03 RX ADMIN — MAGNESIUM OXIDE 400 MG ORAL TABLET 400 MILLIGRAM(S): 241.3 TABLET ORAL at 20:31

## 2017-06-03 RX ADMIN — Medication 81 MILLIGRAM(S): at 12:01

## 2017-06-03 RX ADMIN — Medication 250 MILLIGRAM(S): at 13:01

## 2017-06-03 RX ADMIN — Medication 1 TABLET(S): at 12:01

## 2017-06-03 RX ADMIN — MAGNESIUM OXIDE 400 MG ORAL TABLET 400 MILLIGRAM(S): 241.3 TABLET ORAL at 12:01

## 2017-06-03 RX ADMIN — Medication 110 MILLIGRAM(S): at 18:45

## 2017-06-03 RX ADMIN — Medication 1 TABLET(S): at 18:45

## 2017-06-03 RX ADMIN — Medication 100 MILLIGRAM(S): at 22:04

## 2017-06-03 RX ADMIN — Medication 1 MILLIGRAM(S): at 12:01

## 2017-06-03 RX ADMIN — HEPARIN SODIUM 5000 UNIT(S): 5000 INJECTION INTRAVENOUS; SUBCUTANEOUS at 22:53

## 2017-06-03 NOTE — PHYSICAL THERAPY INITIAL EVALUATION ADULT - DISCHARGE DISPOSITION, PT EVAL
home w/ assist/Home with Home PT for strengthening, bed mob, transfer, gait and balance training, home with assist as needed/home w/ home PT/home

## 2017-06-03 NOTE — DISCHARGE NOTE ADULT - CARE PROVIDER_API CALL
Judy Hoang (PIPER), Internal Medicine  [address] [place] [id number]  Phone: [phone]  Fax: [phone] Judy Hoang (PIPER), Internal Medicine  [address] [place] [address]  Phone: [phone]  Fax: [phone]    Lupe Drake), Critical Care Medicine; Pulmonary Disease  [address] [place] [address]  Phone: [phone]  Fax: [phone]

## 2017-06-03 NOTE — DISCHARGE NOTE ADULT - PLAN OF CARE
Primary Care Follow Up When you came to the hospital, it was found that you have pneumonia. You were treated with antibiotics and improved. You completed your antibiotic course during the hospitalization, so you will not be discharged on antibiotics. Tessalon perle was sent to your pharmacy to take as prescribed as needed for the cough. Please  and take as prescribed. If you have fever, worsened sputum, or any other progressive symptoms, please return to the ER. Pulmonary Follow Up You have a history of pulmonary fibrosis. You were seen by the pulmonary team who recommended that you follow up with them as an outpatient. Please follow up with  within one to two weeks after discharge. The information for the office has been listed below. Please call and make an appointment. You will be told your blood work results for the pulmonary fibrosis workup at the appointment. Primary Care Follow up You have a history of anemia and thalassemia. Please follow up with your primary care physician routinely to monitor your blood counts While you were in the hospital, your kidney function worsened because of infection. Your kidney function improved when the infection improved. Please follow up with your primary care physician within one week of discharge to monitor your kidney function. You have a history of pulmonary fibrosis. You were seen by the pulmonary team who recommended that you follow up with them as an outpatient. Please follow up with  within one to two weeks after discharge. The information for the office has been listed below. Please call and make an appointment. It was recommended that blood be collected for testing of different causes of pulmonary fibrosis, but you refused the blood work. Please get the following labs done as an outpatient: ANCA reflex MPO and PROT3, MONTSE, neutrophil cytoplasmic antibody, rheumatoid factor, and sjogren's syndrome antibodies.

## 2017-06-03 NOTE — DISCHARGE NOTE ADULT - HOSPITAL COURSE
83M with history as per HPI, presented for cough, found to have pneumonia. The patient was placed on vanc/zosyn/doxy then de-escalated to ceftriaxone/doxy. He clinically worsened and was switched back to vanc/zosyn/doxy. Sputum culture ______. The patient had ILYA on admission, which resolved. The patient was diuresed as he had JVD on admission. RUQ US was done for hyperbilirubinemia and was within normal limits, hyperbili resolved. The patient complained of a black bowel movement. On rectal exam there was no black stool and FOBT was ___. Hospital Admission:    83yoM with PMH of CAD s/p MI with CABG (2004), thalassemia (baseline Hb ~10), HTN, BPH, recent hospitalization for fall c/b L psoas muscle hemtaoma presenting with 5 days of cough and fever x 1 day. History obtained from patient, with family at bedside.     Pt states since 4 days PTA, he has been having a non productive cough; the cough has not worsened, but has not improved; he denies any associated SOB. On the day the cough started, he endorses feeling chills, denies fevers. Per pt, he has been having normal PO intake since then; but does endorse feeling weaker and more tired. Per family at bedside, pt appeared slightly confused yesterday at his sisters house (confusing the way to the bathroom; sitting with food in front of him but not eating); per son, pt felt hot the night PTA, and temperature taken showed ~102F. Family took him to urgent care on the morning of admission, where he was again found to be febrile, was told he had an infection and to come to the ED.   Pt otherwise denies any headaches, blurry vision, rhinorrhea, SOB, abdominal pain, nausea/vomiting, dysuria, rash; denies sick contacts or recent travel.    Per family, pt currently at baseline mental status.    Pt was recently hospitalized at [facility] in mid March; initially for FluB infection; day after discharge, pt syncopized, presumed orthostatics 2/2 dehydration. Shortly after discharge from that hospitalization, returned for L hip pain and found to have L psoas muscle hematoma. Had TTE in 3/2017 showing stage II diastolic dysfunction mild-mod MR and normal LV function.     Pt lives at home with his son since the above mentioned hospitalizations; is independent with all ADLs. Pt refuses to have any contrast studies due to concern for kidney injury.     ED course:  T97.4-101.4 HR67-91  -591/53-70  16-18RR 96-98%RA  Labs notable for normal WBC Hb 9.9 (baseline), hyponatremia to 134, Cr 1.32 (baseline 1-1.10). RVP negative   CXR showing vascular congestion   s/p vanco/zosyn/azithromycin; 500cc bolus; tylenol     Hospital Course:    Patient was admitted to medicine for further evaluation and care.  The patient was placed on vanc/zosyn/doxy then de-escalated to ceftriaxone/doxy. He clinically worsened and was switched back to vanc/zosyn/doxy. Sputum culture grew normal respiratory renee. The patient had ILYA on admission, which resolved. The patient was diuresed as he had JVD on admission. RUQ US was done for hyperbilirubinemia and was within normal limits, hyperbili resolved. The patient complained of a black bowel movement. On rectal exam there was no black stool and FOBT was ___. Hospital Admission:    83yoM with PMH of CAD s/p MI with CABG (2004), thalassemia (baseline Hb ~10), HTN, BPH, recent hospitalization for fall c/b L psoas muscle hemtaoma presenting with 5 days of cough and fever x 1 day. History obtained from patient, with family at bedside.     Pt states since 4 days PTA, he has been having a non productive cough; the cough has not worsened, but has not improved; he denies any associated SOB. On the day the cough started, he endorses feeling chills, denies fevers. Per pt, he has been having normal PO intake since then; but does endorse feeling weaker and more tired. Per family at bedside, pt appeared slightly confused yesterday at his sisters house (confusing the way to the bathroom; sitting with food in front of him but not eating); per son, pt felt hot the night PTA, and temperature taken showed ~102F. Family took him to urgent care on the morning of admission, where he was again found to be febrile, was told he had an infection and to come to the ED.   Pt otherwise denies any headaches, blurry vision, rhinorrhea, SOB, abdominal pain, nausea/vomiting, dysuria, rash; denies sick contacts or recent travel.    Per family, pt currently at baseline mental status.    Pt was recently hospitalized at [facility] in mid March; initially for FluB infection; day after discharge, pt syncopized, presumed orthostatics 2/2 dehydration. Shortly after discharge from that hospitalization, returned for L hip pain and found to have L psoas muscle hematoma. Had TTE in 3/2017 showing stage II diastolic dysfunction mild-mod MR and normal LV function.     Pt lives at home with his son since the above mentioned hospitalizations; is independent with all ADLs. Pt refuses to have any contrast studies due to concern for kidney injury.     ED course:  T97.4-101.4 HR67-91  -029/53-70  16-18RR 96-98%RA  Labs notable for normal WBC Hb 9.9 (baseline), hyponatremia to 134, Cr 1.32 (baseline 1-1.10). RVP negative   CXR showing vascular congestion   s/p vanco/zosyn/azithromycin; 500cc bolus; tylenol     Hospital Course:    Patient was admitted to medicine for further evaluation and care.  The patient was placed on vanc/zosyn/doxy then de-escalated to ceftriaxone/doxy. He clinically worsened and was switched back to vanc/zosyn/doxy. Sputum culture grew normal respiratory renee. The patient had ILYA on admission, which resolved. The patient was diuresed as he had JVD on admission. RUQ US was done for hyperbilirubinemia and was within normal limits, hyperbili resolved. The patient complained of a black bowel movement. On rectal exam there was no black stool and FOBT was negative. Hospital Admission:    83yoM with PMH of CAD s/p MI with CABG (2004), thalassemia (baseline Hb ~10), HTN, BPH, recent hospitalization for fall c/b L psoas muscle hemtaoma presenting with 5 days of cough and fever x 1 day. History obtained from patient, with family at bedside.     Pt states since 4 days PTA, he has been having a non productive cough; the cough has not worsened, but has not improved; he denies any associated SOB. On the day the cough started, he endorses feeling chills, denies fevers. Per pt, he has been having normal PO intake since then; but does endorse feeling weaker and more tired. Per family at bedside, pt appeared slightly confused yesterday at his sisters house (confusing the way to the bathroom; sitting with food in front of him but not eating); per son, pt felt hot the night PTA, and temperature taken showed ~102F. Family took him to urgent care on the morning of admission, where he was again found to be febrile, was told he had an infection and to come to the ED.   Pt otherwise denies any headaches, blurry vision, rhinorrhea, SOB, abdominal pain, nausea/vomiting, dysuria, rash; denies sick contacts or recent travel.    Per family, pt currently at baseline mental status.    Pt was recently hospitalized at [facility] in mid March; initially for FluB infection; day after discharge, pt syncopized, presumed orthostatics 2/2 dehydration. Shortly after discharge from that hospitalization, returned for L hip pain and found to have L psoas muscle hematoma. Had TTE in 3/2017 showing stage II diastolic dysfunction mild-mod MR and normal LV function.     Pt lives at home with his son since the above mentioned hospitalizations; is independent with all ADLs. Pt refuses to have any contrast studies due to concern for kidney injury.     ED course:  T97.4-101.4 HR67-91  -397/53-70  16-18RR 96-98%RA  Labs notable for normal WBC Hb 9.9 (baseline), hyponatremia to 134, Cr 1.32 (baseline 1-1.10). RVP negative   CXR showing vascular congestion   s/p vanco/zosyn/azithromycin; 500cc bolus; tylenol     Hospital Course:    Patient was admitted to medicine for further evaluation and care.  The patient was placed on vanc/zosyn/doxy then de-escalated to ceftriaxone/doxy. He clinically worsened and was switched back to vanc/zosyn/doxy. Sputum culture grew normal respiratory renee. The patient had ILYA on admission, which resolved. The patient was diuresed as he had JVD on admission. RUQ US was done for hyperbilirubinemia and was within normal limits, hyperbili resolved. The patient complained of a black bowel movement. On rectal exam there was no black stool and FOBT was negative. Patient was seen by ID and recommended discontinuing antibiotics. Repeat CT chest showed pulmonary fibrosis that was unchanged. Pulmonary team saw patient and recommended outpatient follow up as well as lab work up for pulmonary fibrosis. Labs were collected before discharge and patient was instructed to follow up with pulmonary for the results. On the day of discharge, patient's symptoms improved and he was hemodynamically stable. Hospital Admission:    83yoM with PMH of CAD s/p MI with CABG (2004), thalassemia (baseline Hb ~10), HTN, BPH, recent hospitalization for fall c/b L psoas muscle hemtaoma presenting with 5 days of cough and fever x 1 day. History obtained from patient, with family at bedside.     Pt states since 4 days PTA, he has been having a non productive cough; the cough has not worsened, but has not improved; he denies any associated SOB. On the day the cough started, he endorses feeling chills, denies fevers. Per pt, he has been having normal PO intake since then; but does endorse feeling weaker and more tired. Per family at bedside, pt appeared slightly confused yesterday at his sisters house (confusing the way to the bathroom; sitting with food in front of him but not eating); per son, pt felt hot the night PTA, and temperature taken showed ~102F. Family took him to urgent care on the morning of admission, where he was again found to be febrile, was told he had an infection and to come to the ED.   Pt otherwise denies any headaches, blurry vision, rhinorrhea, SOB, abdominal pain, nausea/vomiting, dysuria, rash; denies sick contacts or recent travel.    Per family, pt currently at baseline mental status.    Pt was recently hospitalized at [facility] in mid March; initially for FluB infection; day after discharge, pt syncopized, presumed orthostatics 2/2 dehydration. Shortly after discharge from that hospitalization, returned for L hip pain and found to have L psoas muscle hematoma. Had TTE in 3/2017 showing stage II diastolic dysfunction mild-mod MR and normal LV function.     Pt lives at home with his son since the above mentioned hospitalizations; is independent with all ADLs. Pt refuses to have any contrast studies due to concern for kidney injury.     ED course:  T97.4-101.4 HR67-91  -526/53-70  16-18RR 96-98%RA  Labs notable for normal WBC Hb 9.9 (baseline), hyponatremia to 134, Cr 1.32 (baseline 1-1.10). RVP negative   CXR showing vascular congestion   s/p vanco/zosyn/azithromycin; 500cc bolus; tylenol     Hospital Course:    Patient was admitted to medicine for further evaluation and care.  The patient was placed on vanc/zosyn/doxy then de-escalated to ceftriaxone/doxy. He clinically worsened and was switched back to vanc/zosyn/doxy. Sputum culture grew normal respiratory renee. The patient had ILYA on admission, which resolved. The patient was diuresed as he had JVD on admission. RUQ US was done for hyperbilirubinemia and was within normal limits, hyperbili resolved. The patient complained of a black bowel movement. On rectal exam there was no black stool and FOBT was negative. Patient was seen by ID and recommended discontinuing antibiotics. Repeat CT chest showed pulmonary fibrosis that was unchanged. Pulmonary team saw patient and recommended outpatient follow up as well as lab work up for pulmonary fibrosis. Patient refused blood draw for the pulmonary fibrosis work up. He was instructed to get them as an outpatient. Labs were collected before discharge and patient was instructed to follow up with pulmonary for the results. On the day of discharge, patient's symptoms improved and he was hemodynamically stable.

## 2017-06-03 NOTE — DISCHARGE NOTE ADULT - CARE PROVIDERS DIRECT ADDRESSES
[email or web address]

## 2017-06-03 NOTE — DISCHARGE NOTE ADULT - PATIENT PORTAL LINK FT
“You can access the Follow[employer] Patient Portal, offered by [facility], by registering with the following website: [email or web address]”

## 2017-06-03 NOTE — DISCHARGE NOTE ADULT - HOME CARE AGENCY
Home care services- home P/T [facility] - A visiting nurse will contact you to schedule your home care visit. Please contact [facility] if you have any questions

## 2017-06-03 NOTE — DISCHARGE NOTE ADULT - CARE PLAN
Principal Discharge DX:	Pneumonia of right middle lobe due to infectious organism  Goal:	Primary Care Follow Up  Instructions for follow-up, activity and diet:	When you came to the hospital, it was found that you have pneumonia. You were treated with antibiotics and improved. You completed your antibiotic course during the hospitalization, so you will not be discharged on antibiotics. Tessalon perle was sent to your pharmacy to take as prescribed as needed for the cough. Please  and take as prescribed. If you have fever, worsened sputum, or any other progressive symptoms, please return to the ER.  Secondary Diagnosis:	Pulmonary fibrosis  Goal:	Pulmonary Follow Up  Instructions for follow-up, activity and diet:	You have a history of pulmonary fibrosis. You were seen by the pulmonary team who recommended that you follow up with them as an outpatient. Please follow up with  within one to two weeks after discharge. The information for the office has been listed below. Please call and make an appointment. You will be told your blood work results for the pulmonary fibrosis workup at the appointment.  Secondary Diagnosis:	Thalassemia, unspecified type  Goal:	Primary Care Follow up  Instructions for follow-up, activity and diet:	You have a history of anemia and thalassemia. Please follow up with your primary care physician routinely to monitor your blood counts  Secondary Diagnosis:	ILYA (acute kidney injury)  Goal:	Primary Care Follow Up  Instructions for follow-up, activity and diet:	While you were in the hospital, your kidney function worsened because of infection. Your kidney function improved when the infection improved. Please follow up with your primary care physician within one week of discharge to monitor your kidney function. Principal Discharge DX:	Pneumonia of right middle lobe due to infectious organism  Goal:	Primary Care Follow Up  Instructions for follow-up, activity and diet:	When you came to the hospital, it was found that you have pneumonia. You were treated with antibiotics and improved. You completed your antibiotic course during the hospitalization, so you will not be discharged on antibiotics. Tessalon perle was sent to your pharmacy to take as prescribed as needed for the cough. Please  and take as prescribed. If you have fever, worsened sputum, or any other progressive symptoms, please return to the ER.  Secondary Diagnosis:	Pulmonary fibrosis  Goal:	Pulmonary Follow Up  Instructions for follow-up, activity and diet:	You have a history of pulmonary fibrosis. You were seen by the pulmonary team who recommended that you follow up with them as an outpatient. Please follow up with  within one to two weeks after discharge. The information for the office has been listed below. Please call and make an appointment. It was recommended that blood be collected for testing of different causes of pulmonary fibrosis, but you refused the blood work. Please get the following labs done as an outpatient: ANCA reflex MPO and PROT3, MONTSE, neutrophil cytoplasmic antibody, rheumatoid factor, and sjogren's syndrome antibodies.  Secondary Diagnosis:	Thalassemia, unspecified type  Goal:	Primary Care Follow up  Instructions for follow-up, activity and diet:	You have a history of anemia and thalassemia. Please follow up with your primary care physician routinely to monitor your blood counts  Secondary Diagnosis:	ILYA (acute kidney injury)  Goal:	Primary Care Follow Up  Instructions for follow-up, activity and diet:	While you were in the hospital, your kidney function worsened because of infection. Your kidney function improved when the infection improved. Please follow up with your primary care physician within one week of discharge to monitor your kidney function.

## 2017-06-03 NOTE — PHYSICAL THERAPY INITIAL EVALUATION ADULT - PERTINENT HX OF CURRENT PROBLEM, REHAB EVAL
83yoM with recent hospitalization for fall c/b L psoas muscle hematoma, p/w 5 days of coughing and fever for 1 day, found to have PNA and ILYA, 6/2/17 ABD US (-), 6/1/17 CXR, pulmonary vascular congestion, 6/1/17 ECG, PVCs, R BBB

## 2017-06-03 NOTE — DISCHARGE NOTE ADULT - MEDICATION SUMMARY - MEDICATIONS TO TAKE
I will START or STAY ON the medications listed below when I get home from the hospital:    aspirin 81 mg oral tablet  -- 1 tab(s) by mouth once a day  -- Indication: For CAD    Zocor 20 mg oral tablet  -- 1 tab(s) by mouth once a day (at bedtime)  -- Indication: For CAD    benzonatate 100 mg oral capsule  -- 1 cap(s) by mouth 4 times a day, As needed, Cough  -- Indication: For Cough    metoprolol succinate 100 mg oral tablet, extended release  -- 1 tab(s) by mouth once a day  -- Indication: For Htn    folic acid 1 mg oral tablet  -- 1 tab(s) by mouth once a day  -- Indication: For Supplement

## 2017-06-03 NOTE — PHYSICAL THERAPY INITIAL EVALUATION ADULT - ADDITIONAL COMMENTS
as per pt, resides in a PH with son, no LISA, pt will be alone when son at work, PTA, pt (I) with amb, (I) with ADLs

## 2017-06-04 LAB
ALBUMIN SERPL ELPH-MCNC: 2.9 G/DL — LOW (ref 3.3–5)
ALP SERPL-CCNC: 95 U/L — SIGNIFICANT CHANGE UP (ref 40–120)
ALT FLD-CCNC: 39 U/L RC — SIGNIFICANT CHANGE UP (ref 10–45)
ANION GAP SERPL CALC-SCNC: 11 MMOL/L — SIGNIFICANT CHANGE UP (ref 5–17)
AST SERPL-CCNC: 36 U/L — SIGNIFICANT CHANGE UP (ref 10–40)
BASOPHILS # BLD AUTO: 0 K/UL — SIGNIFICANT CHANGE UP (ref 0–0.2)
BASOPHILS NFR BLD AUTO: 0.3 % — SIGNIFICANT CHANGE UP (ref 0–2)
BILIRUB SERPL-MCNC: 1 MG/DL — SIGNIFICANT CHANGE UP (ref 0.2–1.2)
BUN SERPL-MCNC: 15 MG/DL — SIGNIFICANT CHANGE UP (ref 7–23)
CALCIUM SERPL-MCNC: 8.8 MG/DL — SIGNIFICANT CHANGE UP (ref 8.4–10.5)
CHLORIDE SERPL-SCNC: 98 MMOL/L — SIGNIFICANT CHANGE UP (ref 96–108)
CO2 SERPL-SCNC: 24 MMOL/L — SIGNIFICANT CHANGE UP (ref 22–31)
CREAT SERPL-MCNC: 1.09 MG/DL — SIGNIFICANT CHANGE UP (ref 0.5–1.3)
EOSINOPHIL # BLD AUTO: 0.7 K/UL — HIGH (ref 0–0.5)
EOSINOPHIL NFR BLD AUTO: 8.7 % — HIGH (ref 0–6)
GLUCOSE SERPL-MCNC: 135 MG/DL — HIGH (ref 70–99)
HCT VFR BLD CALC: 29.1 % — LOW (ref 39–50)
HGB BLD-MCNC: 9 G/DL — LOW (ref 13–17)
LYMPHOCYTES # BLD AUTO: 1.2 K/UL — SIGNIFICANT CHANGE UP (ref 1–3.3)
LYMPHOCYTES # BLD AUTO: 15.6 % — SIGNIFICANT CHANGE UP (ref 13–44)
MAGNESIUM SERPL-MCNC: 1.8 MG/DL — SIGNIFICANT CHANGE UP (ref 1.6–2.6)
MCHC RBC-ENTMCNC: 21.7 PG — LOW (ref 27–34)
MCHC RBC-ENTMCNC: 31.1 GM/DL — LOW (ref 32–36)
MCV RBC AUTO: 70 FL — LOW (ref 80–100)
MONOCYTES # BLD AUTO: 0.8 K/UL — SIGNIFICANT CHANGE UP (ref 0–0.9)
MONOCYTES NFR BLD AUTO: 9.5 % — SIGNIFICANT CHANGE UP (ref 2–14)
NEUTROPHILS # BLD AUTO: 5.2 K/UL — SIGNIFICANT CHANGE UP (ref 1.8–7.4)
NEUTROPHILS NFR BLD AUTO: 65.9 % — SIGNIFICANT CHANGE UP (ref 43–77)
PHOSPHATE SERPL-MCNC: 3.4 MG/DL — SIGNIFICANT CHANGE UP (ref 2.5–4.5)
PLATELET # BLD AUTO: 197 K/UL — SIGNIFICANT CHANGE UP (ref 150–400)
POTASSIUM SERPL-MCNC: 4 MMOL/L — SIGNIFICANT CHANGE UP (ref 3.5–5.3)
POTASSIUM SERPL-SCNC: 4 MMOL/L — SIGNIFICANT CHANGE UP (ref 3.5–5.3)
PROT SERPL-MCNC: 7.1 G/DL — SIGNIFICANT CHANGE UP (ref 6–8.3)
RBC # BLD: 4.16 M/UL — LOW (ref 4.2–5.8)
RBC # FLD: 15.1 % — HIGH (ref 10.3–14.5)
SODIUM SERPL-SCNC: 133 MMOL/L — LOW (ref 135–145)
WBC # BLD: 7.9 K/UL — SIGNIFICANT CHANGE UP (ref 3.8–10.5)
WBC # FLD AUTO: 7.9 K/UL — SIGNIFICANT CHANGE UP (ref 3.8–10.5)

## 2017-06-04 PROCEDURE — 99233 SBSQ HOSP IP/OBS HIGH 50: CPT | Mod: GC

## 2017-06-04 RX ORDER — SIMVASTATIN 20 MG/1
20 TABLET, FILM COATED ORAL AT BEDTIME
Qty: 0 | Refills: 0 | Status: DISCONTINUED | OUTPATIENT
Start: 2017-06-04 | End: 2017-06-06

## 2017-06-04 RX ADMIN — HEPARIN SODIUM 5000 UNIT(S): 5000 INJECTION INTRAVENOUS; SUBCUTANEOUS at 05:11

## 2017-06-04 RX ADMIN — HEPARIN SODIUM 5000 UNIT(S): 5000 INJECTION INTRAVENOUS; SUBCUTANEOUS at 13:37

## 2017-06-04 RX ADMIN — SIMVASTATIN 20 MILLIGRAM(S): 20 TABLET, FILM COATED ORAL at 21:26

## 2017-06-04 RX ADMIN — Medication 25 MILLIGRAM(S): at 05:11

## 2017-06-04 RX ADMIN — Medication 110 MILLIGRAM(S): at 05:10

## 2017-06-04 RX ADMIN — Medication 110 MILLIGRAM(S): at 19:39

## 2017-06-04 RX ADMIN — Medication 81 MILLIGRAM(S): at 12:01

## 2017-06-04 RX ADMIN — HEPARIN SODIUM 5000 UNIT(S): 5000 INJECTION INTRAVENOUS; SUBCUTANEOUS at 21:26

## 2017-06-04 RX ADMIN — Medication 1 TABLET(S): at 05:11

## 2017-06-04 RX ADMIN — PIPERACILLIN AND TAZOBACTAM 25 GRAM(S): 4; .5 INJECTION, POWDER, LYOPHILIZED, FOR SOLUTION INTRAVENOUS at 21:26

## 2017-06-04 RX ADMIN — PIPERACILLIN AND TAZOBACTAM 25 GRAM(S): 4; .5 INJECTION, POWDER, LYOPHILIZED, FOR SOLUTION INTRAVENOUS at 06:13

## 2017-06-04 RX ADMIN — Medication 25 MILLIGRAM(S): at 17:06

## 2017-06-04 RX ADMIN — PIPERACILLIN AND TAZOBACTAM 25 GRAM(S): 4; .5 INJECTION, POWDER, LYOPHILIZED, FOR SOLUTION INTRAVENOUS at 14:53

## 2017-06-04 RX ADMIN — Medication 150 MILLIGRAM(S): at 13:36

## 2017-06-04 RX ADMIN — MAGNESIUM OXIDE 400 MG ORAL TABLET 400 MILLIGRAM(S): 241.3 TABLET ORAL at 00:00

## 2017-06-04 RX ADMIN — Medication 1 MILLIGRAM(S): at 12:01

## 2017-06-04 RX ADMIN — Medication 150 MILLIGRAM(S): at 02:52

## 2017-06-04 RX ADMIN — PANTOPRAZOLE SODIUM 40 MILLIGRAM(S): 20 TABLET, DELAYED RELEASE ORAL at 06:13

## 2017-06-04 RX ADMIN — Medication 100 MILLIGRAM(S): at 05:24

## 2017-06-04 RX ADMIN — Medication 650 MILLIGRAM(S): at 12:01

## 2017-06-05 LAB
ALBUMIN SERPL ELPH-MCNC: 2.8 G/DL — LOW (ref 3.3–5)
ALP SERPL-CCNC: 100 U/L — SIGNIFICANT CHANGE UP (ref 40–120)
ALT FLD-CCNC: 40 U/L RC — SIGNIFICANT CHANGE UP (ref 10–45)
ANION GAP SERPL CALC-SCNC: 13 MMOL/L — SIGNIFICANT CHANGE UP (ref 5–17)
AST SERPL-CCNC: 32 U/L — SIGNIFICANT CHANGE UP (ref 10–40)
BASOPHILS # BLD AUTO: 0 K/UL — SIGNIFICANT CHANGE UP (ref 0–0.2)
BASOPHILS NFR BLD AUTO: 0.6 % — SIGNIFICANT CHANGE UP (ref 0–2)
BILIRUB SERPL-MCNC: 0.7 MG/DL — SIGNIFICANT CHANGE UP (ref 0.2–1.2)
BUN SERPL-MCNC: 20 MG/DL — SIGNIFICANT CHANGE UP (ref 7–23)
CALCIUM SERPL-MCNC: 8.9 MG/DL — SIGNIFICANT CHANGE UP (ref 8.4–10.5)
CHLORIDE SERPL-SCNC: 100 MMOL/L — SIGNIFICANT CHANGE UP (ref 96–108)
CO2 SERPL-SCNC: 23 MMOL/L — SIGNIFICANT CHANGE UP (ref 22–31)
CREAT SERPL-MCNC: 1.14 MG/DL — SIGNIFICANT CHANGE UP (ref 0.5–1.3)
CULTURE RESULTS: SIGNIFICANT CHANGE UP
EOSINOPHIL # BLD AUTO: 1 K/UL — HIGH (ref 0–0.5)
EOSINOPHIL NFR BLD AUTO: 14.2 % — HIGH (ref 0–6)
GLUCOSE SERPL-MCNC: 122 MG/DL — HIGH (ref 70–99)
HCT VFR BLD CALC: 29.8 % — LOW (ref 39–50)
HGB BLD-MCNC: 9.4 G/DL — LOW (ref 13–17)
LYMPHOCYTES # BLD AUTO: 1 K/UL — SIGNIFICANT CHANGE UP (ref 1–3.3)
LYMPHOCYTES # BLD AUTO: 13.7 % — SIGNIFICANT CHANGE UP (ref 13–44)
MAGNESIUM SERPL-MCNC: 2 MG/DL — SIGNIFICANT CHANGE UP (ref 1.6–2.6)
MCHC RBC-ENTMCNC: 22.3 PG — LOW (ref 27–34)
MCHC RBC-ENTMCNC: 31.7 GM/DL — LOW (ref 32–36)
MCV RBC AUTO: 70.2 FL — LOW (ref 80–100)
MONOCYTES # BLD AUTO: 0.7 K/UL — SIGNIFICANT CHANGE UP (ref 0–0.9)
MONOCYTES NFR BLD AUTO: 9.2 % — SIGNIFICANT CHANGE UP (ref 2–14)
NEUTROPHILS # BLD AUTO: 4.6 K/UL — SIGNIFICANT CHANGE UP (ref 1.8–7.4)
NEUTROPHILS NFR BLD AUTO: 62.3 % — SIGNIFICANT CHANGE UP (ref 43–77)
OB PNL STL: NEGATIVE — SIGNIFICANT CHANGE UP
PHOSPHATE SERPL-MCNC: 3.6 MG/DL — SIGNIFICANT CHANGE UP (ref 2.5–4.5)
PLATELET # BLD AUTO: 220 K/UL — SIGNIFICANT CHANGE UP (ref 150–400)
POTASSIUM SERPL-MCNC: 4.4 MMOL/L — SIGNIFICANT CHANGE UP (ref 3.5–5.3)
POTASSIUM SERPL-SCNC: 4.4 MMOL/L — SIGNIFICANT CHANGE UP (ref 3.5–5.3)
PROT SERPL-MCNC: 7.2 G/DL — SIGNIFICANT CHANGE UP (ref 6–8.3)
RBC # BLD: 4.25 M/UL — SIGNIFICANT CHANGE UP (ref 4.2–5.8)
RBC # FLD: 15.2 % — HIGH (ref 10.3–14.5)
SODIUM SERPL-SCNC: 136 MMOL/L — SIGNIFICANT CHANGE UP (ref 135–145)
SPECIMEN SOURCE: SIGNIFICANT CHANGE UP
VANCOMYCIN TROUGH SERPL-MCNC: 17.9 UG/ML — SIGNIFICANT CHANGE UP (ref 10–20)
WBC # BLD: 7.3 K/UL — SIGNIFICANT CHANGE UP (ref 3.8–10.5)
WBC # FLD AUTO: 7.3 K/UL — SIGNIFICANT CHANGE UP (ref 3.8–10.5)

## 2017-06-05 PROCEDURE — 99232 SBSQ HOSP IP/OBS MODERATE 35: CPT | Mod: GC

## 2017-06-05 PROCEDURE — 99223 1ST HOSP IP/OBS HIGH 75: CPT

## 2017-06-05 PROCEDURE — 71250 CT THORAX DX C-: CPT | Mod: 26

## 2017-06-05 RX ORDER — ACETAMINOPHEN 500 MG
650 TABLET ORAL EVERY 6 HOURS
Qty: 0 | Refills: 0 | Status: DISCONTINUED | OUTPATIENT
Start: 2017-06-05 | End: 2017-06-06

## 2017-06-05 RX ADMIN — PANTOPRAZOLE SODIUM 40 MILLIGRAM(S): 20 TABLET, DELAYED RELEASE ORAL at 06:18

## 2017-06-05 RX ADMIN — Medication 110 MILLIGRAM(S): at 05:58

## 2017-06-05 RX ADMIN — PIPERACILLIN AND TAZOBACTAM 25 GRAM(S): 4; .5 INJECTION, POWDER, LYOPHILIZED, FOR SOLUTION INTRAVENOUS at 22:25

## 2017-06-05 RX ADMIN — HEPARIN SODIUM 5000 UNIT(S): 5000 INJECTION INTRAVENOUS; SUBCUTANEOUS at 05:59

## 2017-06-05 RX ADMIN — PIPERACILLIN AND TAZOBACTAM 25 GRAM(S): 4; .5 INJECTION, POWDER, LYOPHILIZED, FOR SOLUTION INTRAVENOUS at 05:59

## 2017-06-05 RX ADMIN — Medication 100 MILLIGRAM(S): at 10:18

## 2017-06-05 RX ADMIN — Medication 100 MILLIGRAM(S): at 17:34

## 2017-06-05 RX ADMIN — PIPERACILLIN AND TAZOBACTAM 25 GRAM(S): 4; .5 INJECTION, POWDER, LYOPHILIZED, FOR SOLUTION INTRAVENOUS at 14:35

## 2017-06-05 RX ADMIN — HEPARIN SODIUM 5000 UNIT(S): 5000 INJECTION INTRAVENOUS; SUBCUTANEOUS at 22:25

## 2017-06-05 RX ADMIN — Medication 650 MILLIGRAM(S): at 17:48

## 2017-06-05 RX ADMIN — Medication 1 MILLIGRAM(S): at 11:23

## 2017-06-05 RX ADMIN — HEPARIN SODIUM 5000 UNIT(S): 5000 INJECTION INTRAVENOUS; SUBCUTANEOUS at 13:23

## 2017-06-05 RX ADMIN — Medication 81 MILLIGRAM(S): at 11:23

## 2017-06-05 RX ADMIN — Medication 150 MILLIGRAM(S): at 13:23

## 2017-06-05 RX ADMIN — Medication 100 MILLIGRAM(S): at 22:25

## 2017-06-05 RX ADMIN — Medication 650 MILLIGRAM(S): at 18:38

## 2017-06-05 RX ADMIN — Medication 25 MILLIGRAM(S): at 17:34

## 2017-06-05 RX ADMIN — SIMVASTATIN 20 MILLIGRAM(S): 20 TABLET, FILM COATED ORAL at 22:25

## 2017-06-05 RX ADMIN — Medication 150 MILLIGRAM(S): at 03:12

## 2017-06-06 VITALS — DIASTOLIC BLOOD PRESSURE: 72 MMHG | HEART RATE: 70 BPM | SYSTOLIC BLOOD PRESSURE: 163 MMHG

## 2017-06-06 LAB
ANION GAP SERPL CALC-SCNC: 10 MMOL/L — SIGNIFICANT CHANGE UP (ref 5–17)
BASOPHILS # BLD AUTO: 0 K/UL — SIGNIFICANT CHANGE UP (ref 0–0.2)
BASOPHILS NFR BLD AUTO: 0.6 % — SIGNIFICANT CHANGE UP (ref 0–2)
BUN SERPL-MCNC: 18 MG/DL — SIGNIFICANT CHANGE UP (ref 7–23)
CALCIUM SERPL-MCNC: 9.5 MG/DL — SIGNIFICANT CHANGE UP (ref 8.4–10.5)
CHLORIDE SERPL-SCNC: 100 MMOL/L — SIGNIFICANT CHANGE UP (ref 96–108)
CO2 SERPL-SCNC: 26 MMOL/L — SIGNIFICANT CHANGE UP (ref 22–31)
CREAT SERPL-MCNC: 1.27 MG/DL — SIGNIFICANT CHANGE UP (ref 0.5–1.3)
CULTURE RESULTS: SIGNIFICANT CHANGE UP
CULTURE RESULTS: SIGNIFICANT CHANGE UP
EOSINOPHIL # BLD AUTO: 1 K/UL — HIGH (ref 0–0.5)
EOSINOPHIL NFR BLD AUTO: 12 % — HIGH (ref 0–6)
GLUCOSE SERPL-MCNC: 106 MG/DL — HIGH (ref 70–99)
HCT VFR BLD CALC: 31.5 % — LOW (ref 39–50)
HGB BLD-MCNC: 9.9 G/DL — LOW (ref 13–17)
LYMPHOCYTES # BLD AUTO: 1.1 K/UL — SIGNIFICANT CHANGE UP (ref 1–3.3)
LYMPHOCYTES # BLD AUTO: 13.8 % — SIGNIFICANT CHANGE UP (ref 13–44)
MAGNESIUM SERPL-MCNC: 2 MG/DL — SIGNIFICANT CHANGE UP (ref 1.6–2.6)
MCHC RBC-ENTMCNC: 22.2 PG — LOW (ref 27–34)
MCHC RBC-ENTMCNC: 31.5 GM/DL — LOW (ref 32–36)
MCV RBC AUTO: 70.4 FL — LOW (ref 80–100)
MONOCYTES # BLD AUTO: 0.7 K/UL — SIGNIFICANT CHANGE UP (ref 0–0.9)
MONOCYTES NFR BLD AUTO: 9 % — SIGNIFICANT CHANGE UP (ref 2–14)
NEUTROPHILS # BLD AUTO: 5.2 K/UL — SIGNIFICANT CHANGE UP (ref 1.8–7.4)
NEUTROPHILS NFR BLD AUTO: 64.7 % — SIGNIFICANT CHANGE UP (ref 43–77)
PHOSPHATE SERPL-MCNC: 3.2 MG/DL — SIGNIFICANT CHANGE UP (ref 2.5–4.5)
PLATELET # BLD AUTO: 251 K/UL — SIGNIFICANT CHANGE UP (ref 150–400)
POTASSIUM SERPL-MCNC: 4.7 MMOL/L — SIGNIFICANT CHANGE UP (ref 3.5–5.3)
POTASSIUM SERPL-SCNC: 4.7 MMOL/L — SIGNIFICANT CHANGE UP (ref 3.5–5.3)
PROCALCITONIN SERPL-MCNC: 0.11 NG/ML — HIGH (ref 0–0.04)
PROT SERPL-MCNC: 6.9 G/DL — SIGNIFICANT CHANGE UP (ref 6–8.3)
PROT SERPL-MCNC: 6.9 G/DL — SIGNIFICANT CHANGE UP (ref 6–8.3)
RBC # BLD: 4.48 M/UL — SIGNIFICANT CHANGE UP (ref 4.2–5.8)
RBC # FLD: 15.5 % — HIGH (ref 10.3–14.5)
SODIUM SERPL-SCNC: 136 MMOL/L — SIGNIFICANT CHANGE UP (ref 135–145)
SPECIMEN SOURCE: SIGNIFICANT CHANGE UP
SPECIMEN SOURCE: SIGNIFICANT CHANGE UP
WBC # BLD: 8 K/UL — SIGNIFICANT CHANGE UP (ref 3.8–10.5)
WBC # FLD AUTO: 8 K/UL — SIGNIFICANT CHANGE UP (ref 3.8–10.5)

## 2017-06-06 PROCEDURE — 99285 EMERGENCY DEPT VISIT HI MDM: CPT | Mod: 25

## 2017-06-06 PROCEDURE — 86850 RBC ANTIBODY SCREEN: CPT

## 2017-06-06 PROCEDURE — 83930 ASSAY OF BLOOD OSMOLALITY: CPT

## 2017-06-06 PROCEDURE — 86480 TB TEST CELL IMMUN MEASURE: CPT

## 2017-06-06 PROCEDURE — 84155 ASSAY OF PROTEIN SERUM: CPT

## 2017-06-06 PROCEDURE — 82272 OCCULT BLD FECES 1-3 TESTS: CPT

## 2017-06-06 PROCEDURE — 84145 PROCALCITONIN (PCT): CPT

## 2017-06-06 PROCEDURE — 82570 ASSAY OF URINE CREATININE: CPT

## 2017-06-06 PROCEDURE — 87070 CULTURE OTHR SPECIMN AEROBIC: CPT

## 2017-06-06 PROCEDURE — 84300 ASSAY OF URINE SODIUM: CPT

## 2017-06-06 PROCEDURE — 86334 IMMUNOFIX E-PHORESIS SERUM: CPT

## 2017-06-06 PROCEDURE — 99232 SBSQ HOSP IP/OBS MODERATE 35: CPT

## 2017-06-06 PROCEDURE — 80202 ASSAY OF VANCOMYCIN: CPT

## 2017-06-06 PROCEDURE — 87449 NOS EACH ORGANISM AG IA: CPT

## 2017-06-06 PROCEDURE — 97162 PT EVAL MOD COMPLEX 30 MIN: CPT

## 2017-06-06 PROCEDURE — 76705 ECHO EXAM OF ABDOMEN: CPT

## 2017-06-06 PROCEDURE — 96374 THER/PROPH/DIAG INJ IV PUSH: CPT

## 2017-06-06 PROCEDURE — 71045 X-RAY EXAM CHEST 1 VIEW: CPT

## 2017-06-06 PROCEDURE — 83735 ASSAY OF MAGNESIUM: CPT

## 2017-06-06 PROCEDURE — 71250 CT THORAX DX C-: CPT

## 2017-06-06 PROCEDURE — 86901 BLOOD TYPING SEROLOGIC RH(D): CPT

## 2017-06-06 PROCEDURE — 84100 ASSAY OF PHOSPHORUS: CPT

## 2017-06-06 PROCEDURE — 86900 BLOOD TYPING SEROLOGIC ABO: CPT

## 2017-06-06 PROCEDURE — 99239 HOSP IP/OBS DSCHRG MGMT >30: CPT

## 2017-06-06 PROCEDURE — 80048 BASIC METABOLIC PNL TOTAL CA: CPT

## 2017-06-06 PROCEDURE — 87633 RESP VIRUS 12-25 TARGETS: CPT

## 2017-06-06 PROCEDURE — 83935 ASSAY OF URINE OSMOLALITY: CPT

## 2017-06-06 PROCEDURE — 87581 M.PNEUMON DNA AMP PROBE: CPT

## 2017-06-06 PROCEDURE — 87040 BLOOD CULTURE FOR BACTERIA: CPT

## 2017-06-06 PROCEDURE — 82436 ASSAY OF URINE CHLORIDE: CPT

## 2017-06-06 PROCEDURE — 84165 PROTEIN E-PHORESIS SERUM: CPT

## 2017-06-06 PROCEDURE — 85027 COMPLETE CBC AUTOMATED: CPT

## 2017-06-06 PROCEDURE — 99223 1ST HOSP IP/OBS HIGH 75: CPT | Mod: GC

## 2017-06-06 PROCEDURE — 82248 BILIRUBIN DIRECT: CPT

## 2017-06-06 PROCEDURE — 80053 COMPREHEN METABOLIC PANEL: CPT

## 2017-06-06 PROCEDURE — 93005 ELECTROCARDIOGRAM TRACING: CPT

## 2017-06-06 PROCEDURE — 87486 CHLMYD PNEUM DNA AMP PROBE: CPT

## 2017-06-06 PROCEDURE — 87798 DETECT AGENT NOS DNA AMP: CPT

## 2017-06-06 RX ADMIN — PANTOPRAZOLE SODIUM 40 MILLIGRAM(S): 20 TABLET, DELAYED RELEASE ORAL at 06:08

## 2017-06-06 RX ADMIN — HEPARIN SODIUM 5000 UNIT(S): 5000 INJECTION INTRAVENOUS; SUBCUTANEOUS at 14:32

## 2017-06-06 RX ADMIN — Medication 81 MILLIGRAM(S): at 12:48

## 2017-06-06 RX ADMIN — Medication 1 MILLIGRAM(S): at 12:48

## 2017-06-06 RX ADMIN — Medication 25 MILLIGRAM(S): at 06:08

## 2017-06-06 RX ADMIN — PIPERACILLIN AND TAZOBACTAM 25 GRAM(S): 4; .5 INJECTION, POWDER, LYOPHILIZED, FOR SOLUTION INTRAVENOUS at 06:07

## 2017-06-06 RX ADMIN — Medication 100 MILLIGRAM(S): at 16:34

## 2017-06-06 RX ADMIN — Medication 25 MILLIGRAM(S): at 17:49

## 2017-06-06 RX ADMIN — HEPARIN SODIUM 5000 UNIT(S): 5000 INJECTION INTRAVENOUS; SUBCUTANEOUS at 06:07

## 2017-06-08 LAB
% ALBUMIN: 38.3 % — SIGNIFICANT CHANGE UP
% ALPHA 1: 6.9 % — SIGNIFICANT CHANGE UP
% ALPHA 2: 15.2 % — SIGNIFICANT CHANGE UP
% BETA: 11.7 % — SIGNIFICANT CHANGE UP
% GAMMA: 27.9 % — SIGNIFICANT CHANGE UP
% M SPIKE: 2.7 % — SIGNIFICANT CHANGE UP
ALBUMIN SERPL ELPH-MCNC: 2.6 G/DL — LOW (ref 3.6–5.5)
ALBUMIN/GLOB SERPL ELPH: 0.6 RATIO — SIGNIFICANT CHANGE UP
ALPHA1 GLOB SERPL ELPH-MCNC: 0.5 G/DL — HIGH (ref 0.1–0.4)
ALPHA2 GLOB SERPL ELPH-MCNC: 1 G/DL — SIGNIFICANT CHANGE UP (ref 0.5–1)
B-GLOBULIN SERPL ELPH-MCNC: 0.8 G/DL — SIGNIFICANT CHANGE UP (ref 0.5–1)
GAMMA GLOBULIN: 1.9 G/DL — HIGH (ref 0.6–1.6)
INTERPRETATION SERPL IFE-IMP: SIGNIFICANT CHANGE UP
M TB TUBERC IFN-G BLD QL: 0.02 IU/ML — SIGNIFICANT CHANGE UP
M TB TUBERC IFN-G BLD QL: 0.06 IU/ML — SIGNIFICANT CHANGE UP
M TB TUBERC IFN-G BLD QL: NEGATIVE — SIGNIFICANT CHANGE UP
M-SPIKE: 0.2 G/DL — HIGH (ref 0–0)
MITOGEN IGNF BCKGRD COR BLD-ACNC: 0.54 IU/ML — SIGNIFICANT CHANGE UP
PROT PATTERN SERPL ELPH-IMP: SIGNIFICANT CHANGE UP

## 2017-08-25 NOTE — ED ADULT NURSE NOTE - CAS EDN INTEG ASSESS
We received labs and SD reviewed them and labs are normal. Left message for patient to call back to schedule an appt.  Labs have been placed in triage folder WDL

## 2017-11-06 PROBLEM — J43.9 EMPHYSEMA/COPD: Status: ACTIVE | Noted: 2017-11-06

## 2017-11-06 PROBLEM — Z86.39 HISTORY OF HYPERCHOLESTEROLEMIA: Status: RESOLVED | Noted: 2017-11-06 | Resolved: 2017-11-06

## 2017-11-06 PROBLEM — N17.9 AKI (ACUTE KIDNEY INJURY): Status: RESOLVED | Noted: 2017-11-06 | Resolved: 2017-11-06

## 2017-11-06 PROBLEM — J84.112 IPF (IDIOPATHIC PULMONARY FIBROSIS): Status: ACTIVE | Noted: 2017-11-06

## 2017-11-06 PROBLEM — E80.6 HYPERBILIRUBINEMIA: Status: RESOLVED | Noted: 2017-11-06 | Resolved: 2017-11-06

## 2017-11-06 PROBLEM — Z87.01 HISTORY OF PNEUMONIA: Status: RESOLVED | Noted: 2017-11-06 | Resolved: 2017-11-06

## 2017-11-06 RX ORDER — SIMVASTATIN 40 MG/1
TABLET, FILM COATED ORAL
Refills: 0 | Status: ACTIVE | COMMUNITY

## 2017-11-06 RX ORDER — METOPROLOL TARTRATE 100 MG/1
100 TABLET, FILM COATED ORAL
Refills: 0 | Status: ACTIVE | COMMUNITY

## 2017-11-07 ENCOUNTER — APPOINTMENT (OUTPATIENT)
Dept: PULMONOLOGY | Facility: CLINIC | Age: 82
End: 2017-11-07
Payer: COMMERCIAL

## 2017-11-07 ENCOUNTER — MED ADMIN CHARGE (OUTPATIENT)
Age: 82
End: 2017-11-07

## 2017-11-07 ENCOUNTER — LABORATORY RESULT (OUTPATIENT)
Age: 82
End: 2017-11-07

## 2017-11-07 VITALS
HEART RATE: 62 BPM | SYSTOLIC BLOOD PRESSURE: 100 MMHG | OXYGEN SATURATION: 94 % | TEMPERATURE: 96 F | HEIGHT: 67 IN | RESPIRATION RATE: 16 BRPM | WEIGHT: 154 LBS | BODY MASS INDEX: 24.17 KG/M2 | DIASTOLIC BLOOD PRESSURE: 70 MMHG

## 2017-11-07 DIAGNOSIS — J84.112 IDIOPATHIC PULMONARY FIBROSIS: ICD-10-CM

## 2017-11-07 DIAGNOSIS — Z87.01 PERSONAL HISTORY OF PNEUMONIA (RECURRENT): ICD-10-CM

## 2017-11-07 DIAGNOSIS — N17.9 ACUTE KIDNEY FAILURE, UNSPECIFIED: ICD-10-CM

## 2017-11-07 DIAGNOSIS — E80.6 OTHER DISORDERS OF BILIRUBIN METABOLISM: ICD-10-CM

## 2017-11-07 DIAGNOSIS — Z87.891 PERSONAL HISTORY OF NICOTINE DEPENDENCE: ICD-10-CM

## 2017-11-07 DIAGNOSIS — J43.9 EMPHYSEMA, UNSPECIFIED: ICD-10-CM

## 2017-11-07 DIAGNOSIS — Z86.39 PERSONAL HISTORY OF OTHER ENDOCRINE, NUTRITIONAL AND METABOLIC DISEASE: ICD-10-CM

## 2017-11-07 PROCEDURE — 99215 OFFICE O/P EST HI 40 MIN: CPT | Mod: 25

## 2017-11-07 PROCEDURE — 36415 COLL VENOUS BLD VENIPUNCTURE: CPT

## 2017-11-07 PROCEDURE — 90686 IIV4 VACC NO PRSV 0.5 ML IM: CPT

## 2017-11-07 PROCEDURE — G0008: CPT

## 2017-12-26 NOTE — ED ADULT NURSE NOTE - NS PRO PASSIVE SMOKE EXP
Anxiety    Back Pain    CKD (chronic kidney disease)    CVA (cerebral infarction)    GERD (Gastroesophageal Reflux Disease)    Hypertension    Intractable Hiccups
Unknown

## 2018-02-21 NOTE — DISCHARGE NOTE ADULT - CARE PROVIDER_API CALL
Post-Anesthesia Evaluation and Assessment    Patient: Kelvin Bangura MRN: [id number]  SSN: [id number]    YOB: 1955  Age: 58 y.o. Sex: female       Cardiovascular Function/Vital Signs  Visit Vitals    /63    Pulse 73    Temp 36.3 °C (97.4 °F)    Resp 14    Wt 92.5 kg (204 lb)    SpO2 96%    BMI 38.55 kg/m2       Patient is status post general anesthesia for Procedure(s):  LEFT SHOULDER ARTHROSCOPY  ROTATOR CUFF REPAIR/SUBACROMIAL DECOMPRESSION/ DISTSAL CLAVICLE EXCISION. Nausea/Vomiting: None    Postoperative hydration reviewed and adequate. Pain:  Pain Scale 1: Numeric (0 - 10) (02/21/18 0911)  Pain Intensity 1: 0 (02/21/18 0911)   Managed    Neurological Status:   Neuro (WDL): Exceptions to WDL (02/21/18 0911)  Neuro  Neurologic State: Drowsy (02/21/18 0911)   At baseline    Mental Status and Level of Consciousness: Arousable    Pulmonary Status:   O2 Device: Nasal cannula (02/21/18 0911)   Adequate oxygenation and airway patent    Complications related to anesthesia: None    Post-anesthesia assessment completed.  No concerns    Signed By: Claudio Power., MD     February 21, 2018
[facility] Schwartz (PIPER), Hematology; Medical Oncology  [address] [facility] [address] [place] [address]  Phone: [phone]  Fax: [phone]

## 2018-10-07 ENCOUNTER — EMERGENCY (EMERGENCY)
Facility: HOSPITAL | Age: 83
LOS: 1 days | Discharge: HOME | End: 2018-10-07
Attending: EMERGENCY MEDICINE
Payer: COMMERCIAL

## 2018-10-07 VITALS
TEMPERATURE: 98 F | HEIGHT: 67 IN | WEIGHT: 160.06 LBS | HEART RATE: 83 BPM | OXYGEN SATURATION: 100 % | SYSTOLIC BLOOD PRESSURE: 196 MMHG | RESPIRATION RATE: 19 BRPM | DIASTOLIC BLOOD PRESSURE: 100 MMHG

## 2018-10-07 PROCEDURE — 64400 NJX AA&/STRD TRIGEMINAL NRV: CPT | Mod: 50

## 2018-10-07 PROCEDURE — 99284 EMERGENCY DEPT VISIT MOD MDM: CPT | Mod: 25

## 2018-10-07 NOTE — ED ADULT NURSE NOTE - OBJECTIVE STATEMENT
pt is an 85 yr M presents with bleeding behind lower teeth that started spontaneously at 7 pm. denies injury. on ASA 81 mg per day. no loose teeth. no distress. pt is an 85 yr M presents with bleeding behind lower teeth that started spontaneously at 7 pm. denies injury. on ASA 81 mg per day. no loose teeth. pressure applied, no distress.

## 2018-10-07 NOTE — ED ADULT NURSE NOTE - NSIMPLEMENTINTERV_GEN_ALL_ED
Implemented All Universal Safety Interventions:  Joiner to call system. Call bell, personal items and telephone within reach. Instruct patient to call for assistance. Room bathroom lighting operational. Non-slip footwear when patient is off stretcher. Physically safe environment: no spills, clutter or unnecessary equipment. Stretcher in lowest position, wheels locked, appropriate side rails in place.

## 2018-10-08 VITALS
SYSTOLIC BLOOD PRESSURE: 169 MMHG | RESPIRATION RATE: 16 BRPM | OXYGEN SATURATION: 100 % | DIASTOLIC BLOOD PRESSURE: 65 MMHG | HEART RATE: 68 BPM

## 2018-10-08 LAB
ALBUMIN SERPL ELPH-MCNC: 4.2 G/DL — SIGNIFICANT CHANGE UP (ref 3.3–5)
ALP SERPL-CCNC: 99 U/L — SIGNIFICANT CHANGE UP (ref 40–120)
ALT FLD-CCNC: 9 U/L — LOW (ref 10–45)
ANION GAP SERPL CALC-SCNC: 9 MMOL/L — SIGNIFICANT CHANGE UP (ref 5–17)
APTT BLD: 29.9 SEC — SIGNIFICANT CHANGE UP (ref 27.5–37.4)
AST SERPL-CCNC: 21 U/L — SIGNIFICANT CHANGE UP (ref 10–40)
BASOPHILS # BLD AUTO: 0.1 K/UL — SIGNIFICANT CHANGE UP (ref 0–0.2)
BASOPHILS NFR BLD AUTO: 1 % — SIGNIFICANT CHANGE UP (ref 0–2)
BILIRUB SERPL-MCNC: 0.8 MG/DL — SIGNIFICANT CHANGE UP (ref 0.2–1.2)
BUN SERPL-MCNC: 24 MG/DL — HIGH (ref 7–23)
CALCIUM SERPL-MCNC: 9.3 MG/DL — SIGNIFICANT CHANGE UP (ref 8.4–10.5)
CHLORIDE SERPL-SCNC: 100 MMOL/L — SIGNIFICANT CHANGE UP (ref 96–108)
CO2 SERPL-SCNC: 23 MMOL/L — SIGNIFICANT CHANGE UP (ref 22–31)
CREAT SERPL-MCNC: 1.26 MG/DL — SIGNIFICANT CHANGE UP (ref 0.5–1.3)
EOSINOPHIL # BLD AUTO: 0.4 K/UL — SIGNIFICANT CHANGE UP (ref 0–0.5)
EOSINOPHIL NFR BLD AUTO: 5.8 % — SIGNIFICANT CHANGE UP (ref 0–6)
GLUCOSE SERPL-MCNC: 118 MG/DL — HIGH (ref 70–99)
HCT VFR BLD CALC: 31.5 % — LOW (ref 39–50)
HGB BLD-MCNC: 10.5 G/DL — LOW (ref 13–17)
INR BLD: 1.14 RATIO — SIGNIFICANT CHANGE UP (ref 0.88–1.16)
LYMPHOCYTES # BLD AUTO: 1.5 K/UL — SIGNIFICANT CHANGE UP (ref 1–3.3)
LYMPHOCYTES # BLD AUTO: 22.7 % — SIGNIFICANT CHANGE UP (ref 13–44)
MCHC RBC-ENTMCNC: 21.9 PG — LOW (ref 27–34)
MCHC RBC-ENTMCNC: 33.4 GM/DL — SIGNIFICANT CHANGE UP (ref 32–36)
MCV RBC AUTO: 65.4 FL — LOW (ref 80–100)
MONOCYTES # BLD AUTO: 0.6 K/UL — SIGNIFICANT CHANGE UP (ref 0–0.9)
MONOCYTES NFR BLD AUTO: 8.3 % — SIGNIFICANT CHANGE UP (ref 2–14)
NEUTROPHILS # BLD AUTO: 4.2 K/UL — SIGNIFICANT CHANGE UP (ref 1.8–7.4)
NEUTROPHILS NFR BLD AUTO: 62.3 % — SIGNIFICANT CHANGE UP (ref 43–77)
PLATELET # BLD AUTO: 176 K/UL — SIGNIFICANT CHANGE UP (ref 150–400)
POTASSIUM SERPL-MCNC: 5.1 MMOL/L — SIGNIFICANT CHANGE UP (ref 3.5–5.3)
POTASSIUM SERPL-SCNC: 5.1 MMOL/L — SIGNIFICANT CHANGE UP (ref 3.5–5.3)
PROT SERPL-MCNC: 9.1 G/DL — HIGH (ref 6–8.3)
PROTHROM AB SERPL-ACNC: 12.3 SEC — SIGNIFICANT CHANGE UP (ref 9.8–12.7)
RBC # BLD: 4.82 M/UL — SIGNIFICANT CHANGE UP (ref 4.2–5.8)
RBC # FLD: 14 % — SIGNIFICANT CHANGE UP (ref 10.3–14.5)
SODIUM SERPL-SCNC: 132 MMOL/L — LOW (ref 135–145)
WBC # BLD: 6.7 K/UL — SIGNIFICANT CHANGE UP (ref 3.8–10.5)
WBC # FLD AUTO: 6.7 K/UL — SIGNIFICANT CHANGE UP (ref 3.8–10.5)

## 2018-10-08 PROCEDURE — 85027 COMPLETE CBC AUTOMATED: CPT

## 2018-10-08 PROCEDURE — 85730 THROMBOPLASTIN TIME PARTIAL: CPT

## 2018-10-08 PROCEDURE — 80053 COMPREHEN METABOLIC PANEL: CPT

## 2018-10-08 PROCEDURE — 99284 EMERGENCY DEPT VISIT MOD MDM: CPT | Mod: 25

## 2018-10-08 PROCEDURE — 64400 NJX AA&/STRD TRIGEMINAL NRV: CPT | Mod: LT

## 2018-10-08 PROCEDURE — 85610 PROTHROMBIN TIME: CPT

## 2018-10-08 RX ORDER — CEPHALEXIN 500 MG
1 CAPSULE ORAL
Qty: 14 | Refills: 0 | OUTPATIENT
Start: 2018-10-08 | End: 2018-10-14

## 2018-10-08 RX ORDER — CEPHALEXIN 500 MG
500 CAPSULE ORAL ONCE
Qty: 0 | Refills: 0 | Status: DISCONTINUED | OUTPATIENT
Start: 2018-10-08 | End: 2018-10-08

## 2018-10-08 NOTE — ED PROVIDER NOTE - PHYSICAL EXAMINATION
Emerald Fish, DO:  Gen: Well appearing, NAD  Head: NCAT  HEENT: PERRL, MMM, normal conjunctiva, anicteric, neck supple, oozing from lower canines   Lung: CTAB, no adventitious sounds  CV: RRR, no murmurs  MSK: No edema, no visible deformities  Skin: Warm and dry, no evidence of rash  Psych: normal mood and affect

## 2018-10-08 NOTE — ED PROVIDER NOTE - PROGRESS NOTE DETAILS
pt continues to bleed s/p TXA, pressure held with gauze x 1 hour, and lido with epi gingival blocks, and lido with epi swish and spit Emerald Fish DO: Patient evaluated by dental, bleeding stopped. Pending re-evaluation by dental, patient safe for discharge with plan for follow up with personal dentist today.

## 2018-10-08 NOTE — ED PROVIDER NOTE - PLAN OF CARE
1. Please touch base with your dentist in 1-3 days.   2. Please return to the ED should you have any new or worsening symptoms or have any new concerns.  3. Read all attached.

## 2018-10-08 NOTE — PROGRESS NOTE ADULT - SUBJECTIVE AND OBJECTIVE BOX
*INTERVAL HPI/OVERNIGHT EVENTS:  85 year old, male presented to ED with uncontrolled bleeding gums. Medical history significant for hypertension, dyslipidemia, coronary artery disease. Past history of bypass. Reports taking aspirin but no other blood thinners. Patient states that bleeding started today at 19:30 and has not stopped. Denies any trauma or recent dental treatment. Patient mention he uses toothpicks but has not used one since yesterday 10/6. Patient reports that this has not happened before and it was spontaneous. In the ED, gauze pressure applied, tranexamic acid and gingival block with 2% lidocaine with 1:100k epi given for hemostasis but was unable to control bleeding. Blood work shows PTT, PT and INR within normal limits.     Allergies  No Known Allergies    Vital Signs Last 24 Hrs  T(C): 36.8 (07 Oct 2018 22:55), Max: 36.8 (07 Oct 2018 22:55)  T(F): 98.2 (07 Oct 2018 22:55), Max: 98.2 (07 Oct 2018 22:55)  HR: 73 (08 Oct 2018 01:10) (73 - 83)  BP: 181/81 (08 Oct 2018 01:10) (181/81 - 206/106)  BP(mean): 135 (08 Oct 2018 00:12) (135 - 135)  RR: 16 (08 Oct 2018 01:10) (16 - 19)  SpO2: 100% (08 Oct 2018 01:10) (98% - 100%)    LABS:                        10.5   6.7   )-----------( 176      ( 08 Oct 2018 00:26 )             31.5     10-08    132<L>  |  100  |  24<H>  ----------------------------<  118<H>  5.1   |  23  |  1.26    Ca    9.3      08 Oct 2018 00:26    TPro  9.1<H>  /  Alb  4.2  /  TBili  0.8  /  DBili  x   /  AST  21  /  ALT  9<L>  /  AlkPhos  99  10-08    WBC Count: 6.7 K/uL [3.8 - 10.5] (10-08 @ 00:26)  Platelet Count - Automated: 176 K/uL [150 - 400] (10-08 @ 00:26)  INR: 1.14 ratio [0.88 - 1.16] (10-08 @ 00:26)    CLINICAL EXAM:  EOE:   (-) trismus, LAD, swelling, asymmetry, palpation, dysphagia  IOE: Permanent dentition with a few missing teeth.   Bleeding from lingual gingiva between teeth 21 and 22. Gingiva appears to be erythematous and edematous. No signs of trauma. Ecchymosis present on FOM adjacent to teeth 21 and 22. Teeth 21 and 22 asymptomatic to percussion, palpation. No mobility present.     DENTAL RADIOGRAPHS: None indicated     ASSESSMENT:  85 year old, male presented to ED with uncontrolled bleeding gums. Medical history significant for hypertension, dyslipidemia, coronary artery disease. Past history of bypass surgery. Reports taking aspirin but no other blood thinners. Patient states that bleeding started today at 19:30 and has not stopped. Denies any trauma or recent dental treatment. Patient states that he uses toothpicks but has not used one since eysterday, 10/6. Patient reports that this has not happened before and it was spontaneous. In the ED, gauze pressure applied, tranexamic acid and gingival block with 2% lidocaine with 1:100k epi given for hemostasis but was unable to control bleeding. Blood work shows PTT, PT and INR within normal limits. Extraoral examination showed (-) trismus, LAD, swelling, asymmetry, palpation and dysphagia. Intraoral examination showed permanent dentition with a few missing teeth. Localized bleeding from lingual gingiva between teeth 21 and 22. Gingiva appears to be erythematous and edematous. No signs of trauma. Ecchymosis present on FOM in area. Unable to determine if this resulted prior to ED visit. Teeth 21 and 22 display no mobility and are asymptomatic to percussion and palpation.     PLAN: Gauze pressure to achieve hemostasis    PROCEDURE:  Verbal consent given.  Procedure:   Extraoral and intraoral examination.  Blood clots removed from site of bleeding. Gauze pressure held for 30 minutes. Hemostasis achieved.     RECOMMENDATIONS:  1) F/U with outpatient dentist for comprehensive dental care upon discharge.  2) If swelling, fever, difficulty breathing/swallowing occurs, page Dental.     Tarah Morel DDS  Pager [phone] *INTERVAL HPI/OVERNIGHT EVENTS:  85 year old, male presented to ED with uncontrolled bleeding gums. Medical history significant for hypertension, dyslipidemia, coronary artery disease. Past history of bypass. Reports taking aspirin but no other blood thinners. Patient states that bleeding started today at 19:30 and has not stopped. Denies any trauma or recent dental treatment. Patient mention he used a proxy brush yesterday, 10/6. Patient reports that this has not happened before and it was spontaneous. In the ED, gauze pressure applied, tranexamic acid and gingival block with 2% lidocaine with 1:100k epi given for hemostasis but was unable to control bleeding. Blood work shows PTT, PT and INR within normal limits.     Allergies  No Known Allergies    Vital Signs Last 24 Hrs  T(C): 36.8 (07 Oct 2018 22:55), Max: 36.8 (07 Oct 2018 22:55)  T(F): 98.2 (07 Oct 2018 22:55), Max: 98.2 (07 Oct 2018 22:55)  HR: 73 (08 Oct 2018 01:10) (73 - 83)  BP: 181/81 (08 Oct 2018 01:10) (181/81 - 206/106)  BP(mean): 135 (08 Oct 2018 00:12) (135 - 135)  RR: 16 (08 Oct 2018 01:10) (16 - 19)  SpO2: 100% (08 Oct 2018 01:10) (98% - 100%)    LABS:                        10.5   6.7   )-----------( 176      ( 08 Oct 2018 00:26 )             31.5     10-08    132<L>  |  100  |  24<H>  ----------------------------<  118<H>  5.1   |  23  |  1.26    Ca    9.3      08 Oct 2018 00:26    TPro  9.1<H>  /  Alb  4.2  /  TBili  0.8  /  DBili  x   /  AST  21  /  ALT  9<L>  /  AlkPhos  99  10-08    WBC Count: 6.7 K/uL [3.8 - 10.5] (10-08 @ 00:26)  Platelet Count - Automated: 176 K/uL [150 - 400] (10-08 @ 00:26)  INR: 1.14 ratio [0.88 - 1.16] (10-08 @ 00:26)    CLINICAL EXAM:  EOE:   (-) trismus, LAD, swelling, asymmetry, palpation, dysphagia  IOE: Permanent dentition with a few missing teeth.   Bleeding from lingual gingiva between teeth 21 and 22. Gingiva appears to be erythematous and edematous. No signs of trauma. Ecchymosis present on FOM adjacent to teeth 21 and 22. Teeth 21 and 22 asymptomatic to percussion, palpation. No mobility present.   Tongue, buccal mucosa, hard and soft palate WNL    DENTAL RADIOGRAPHS: None indicated     ASSESSMENT:  85 year old, male presented to ED with uncontrolled bleeding gums. Medical history significant for hypertension, dyslipidemia, coronary artery disease. Past history of bypass surgery. Reports taking aspirin but no other blood thinners. Patient states that bleeding started today at 19:30 and has not stopped. Denies any trauma or recent dental treatment. Patient states that he used a proxy brush yesterday, 10/6. Patient reports that this has not happened before and it was spontaneous. In the ED, gauze pressure applied, tranexamic acid and gingival block with 2% lidocaine with 1:100k epi given for hemostasis but was unable to control bleeding. Blood work shows PTT, PT and INR within normal limits. Extraoral examination showed (-) trismus, LAD, swelling, asymmetry, palpation and dysphagia. Intraoral examination showed permanent dentition with a few missing teeth. Localized bleeding from lingual gingiva between teeth 21 and 22. Gingiva appears to be erythematous and edematous. No signs of trauma. Ecchymosis present on FOM in area. Unable to determine if this resulted prior to ED visit. Teeth 21 and 22 display no mobility and are asymptomatic to percussion and palpation.     PLAN: Gauze pressure to achieve hemostasis    PROCEDURE:  Verbal consent given.  Procedure:   Extraoral and intraoral examination.  Blood clots removed from site of bleeding. Gauze pressure held for 30 minutes. Hemostasis achieved.     RECOMMENDATIONS:  1) F/U with outpatient dentist for comprehensive dental care upon discharge.  2) If swelling, fever, difficulty breathing/swallowing occurs, page Dental.     Tarah Morel DDS  Pager [phone]

## 2018-10-08 NOTE — ED ADULT NURSE REASSESSMENT NOTE - NS ED NURSE REASSESS COMMENT FT1
Seen and examined by dentist, pressure applied by dentist on the bleeding site. Bleeding stopped, for further monitoring.

## 2018-10-08 NOTE — ED PROVIDER NOTE - ATTENDING CONTRIBUTION TO CARE
84 yo male presents with bleeding gums since this evening, on aspirin. Denies trauma but states was using a toothpick earlier. No hx of bleeding gums in the past. No rashes. Unable to stop bleeding so came to ED. Denies chest pain, sob, fatigue, lightheadedness. PE: well appearing, NAD, oozing from left, lower gingiva. No lacerations, no loose teeth. No petechiae present. No other signs of bleeding. A/P: Will check labs, bleeding control and possible dental consult if unable to stop bleeding, reevaluate.

## 2018-10-08 NOTE — ED PROVIDER NOTE - CARE PLAN
Principal Discharge DX:	Gingival bleeding  Assessment and plan of treatment:	1. Please touch base with your dentist in 1-3 days.   2. Please return to the ED should you have any new or worsening symptoms or have any new concerns.  3. Read all attached.

## 2018-10-08 NOTE — ED ADULT NURSE REASSESSMENT NOTE - NS ED NURSE REASSESS COMMENT FT1
Patient remaisn stable while in the ED, gum still bleeding after Tranexamic acid soaking. Labs pending results, will continue to monitor.

## 2018-10-08 NOTE — ED PROVIDER NOTE - OBJECTIVE STATEMENT
Emerald Abe, DO: 85M with hx of HTN, HLD, alpha-thalassemia trait here for spontaneously bleeding gums. Not provoked by trauma or brushing. no recent dental work. Son is doctor, reports no relief with compression. Hx of transfusion 2/2 LE hematoma.

## 2018-10-08 NOTE — ED PROVIDER NOTE - MEDICAL DECISION MAKING DETAILS
Emerald Fish, DO: 85M without hx of spontaneous bleeding here for oozing from gums. TXA topically. Labs to eval for blood dyscrasias. & reassessment.

## 2018-11-21 NOTE — DISCHARGE NOTE ADULT - PLAN OF CARE
Problem: Patient Care Overview  Goal: Plan of Care Review  Outcome: Ongoing (interventions implemented as appropriate)  Pt on O2 tolerating well. No distress noted at this time.        resolution management Follow up with PCP for further management and treatment. 3/27 CT pelvis - Large intramuscular hematoma within the psoas muscle with surrounding stranding in the fat as detailed above. Smaller hematomas within the left gluteus tarah muscle as well as in the posterior subcutaneous soft tissues at the level of the sacrum. ASA held in the hospital. Follow up with PCP or Card to find out when its ok to resume ASA. Pain medications as needed. Continue taking Iron tabs as prescribed 3xdaily. H/H 9.1/ 28.2 as of 4/3/17, stable. Follow up with PCP for further management and treatment. Continue taking medications as prescribed. Follow up with PCP within a week for further management and treatment and when to resume ASA. Continue taking Flomax as prescribed.

## 2018-11-27 ENCOUNTER — TRANSCRIPTION ENCOUNTER (OUTPATIENT)
Age: 83
End: 2018-11-27

## 2018-11-27 ENCOUNTER — INPATIENT (INPATIENT)
Facility: HOSPITAL | Age: 83
LOS: 1 days | Discharge: HOME | DRG: 66 | End: 2018-11-29
Attending: PSYCHIATRY & NEUROLOGY | Admitting: PSYCHIATRY & NEUROLOGY
Payer: COMMERCIAL

## 2018-11-27 VITALS
TEMPERATURE: 97 F | DIASTOLIC BLOOD PRESSURE: 65 MMHG | HEIGHT: 67 IN | RESPIRATION RATE: 16 BRPM | WEIGHT: 164.91 LBS | OXYGEN SATURATION: 97 % | HEART RATE: 58 BPM | SYSTOLIC BLOOD PRESSURE: 151 MMHG

## 2018-11-27 DIAGNOSIS — R27.0 ATAXIA, UNSPECIFIED: ICD-10-CM

## 2018-11-27 LAB
ALBUMIN SERPL ELPH-MCNC: 3.8 G/DL — SIGNIFICANT CHANGE UP (ref 3.3–5)
ALP SERPL-CCNC: 72 U/L — SIGNIFICANT CHANGE UP (ref 40–120)
ALT FLD-CCNC: 8 U/L — LOW (ref 10–45)
ANION GAP SERPL CALC-SCNC: 11 MMOL/L — SIGNIFICANT CHANGE UP (ref 5–17)
ANISOCYTOSIS BLD QL: SLIGHT — SIGNIFICANT CHANGE UP
APTT BLD: 28.7 SEC — SIGNIFICANT CHANGE UP (ref 27.5–36.3)
AST SERPL-CCNC: 15 U/L — SIGNIFICANT CHANGE UP (ref 10–40)
BASOPHILS # BLD AUTO: 0 K/UL — SIGNIFICANT CHANGE UP (ref 0–0.2)
BASOPHILS NFR BLD AUTO: 0.1 % — SIGNIFICANT CHANGE UP (ref 0–2)
BILIRUB SERPL-MCNC: 0.8 MG/DL — SIGNIFICANT CHANGE UP (ref 0.2–1.2)
BUN SERPL-MCNC: 20 MG/DL — SIGNIFICANT CHANGE UP (ref 7–23)
CALCIUM SERPL-MCNC: 8.9 MG/DL — SIGNIFICANT CHANGE UP (ref 8.4–10.5)
CHLORIDE SERPL-SCNC: 103 MMOL/L — SIGNIFICANT CHANGE UP (ref 96–108)
CO2 SERPL-SCNC: 19 MMOL/L — LOW (ref 22–31)
CREAT SERPL-MCNC: 1.16 MG/DL — SIGNIFICANT CHANGE UP (ref 0.5–1.3)
EOSINOPHIL # BLD AUTO: 0.2 K/UL — SIGNIFICANT CHANGE UP (ref 0–0.5)
EOSINOPHIL NFR BLD AUTO: 3.1 % — SIGNIFICANT CHANGE UP (ref 0–6)
GLUCOSE SERPL-MCNC: 119 MG/DL — HIGH (ref 70–99)
HCT VFR BLD CALC: 31.2 % — LOW (ref 39–50)
HGB BLD-MCNC: 10.4 G/DL — LOW (ref 13–17)
HYPOCHROMIA BLD QL: SLIGHT — SIGNIFICANT CHANGE UP
INR BLD: 1.11 RATIO — SIGNIFICANT CHANGE UP (ref 0.88–1.16)
LYMPHOCYTES # BLD AUTO: 1.1 K/UL — SIGNIFICANT CHANGE UP (ref 1–3.3)
LYMPHOCYTES # BLD AUTO: 21.4 % — SIGNIFICANT CHANGE UP (ref 13–44)
MCHC RBC-ENTMCNC: 21.9 PG — LOW (ref 27–34)
MCHC RBC-ENTMCNC: 33.4 GM/DL — SIGNIFICANT CHANGE UP (ref 32–36)
MCV RBC AUTO: 65.7 FL — LOW (ref 80–100)
MICROCYTES BLD QL: SIGNIFICANT CHANGE UP
MONOCYTES # BLD AUTO: 0.5 K/UL — SIGNIFICANT CHANGE UP (ref 0–0.9)
MONOCYTES NFR BLD AUTO: 8.9 % — SIGNIFICANT CHANGE UP (ref 2–14)
NEUTROPHILS # BLD AUTO: 3.4 K/UL — SIGNIFICANT CHANGE UP (ref 1.8–7.4)
NEUTROPHILS NFR BLD AUTO: 66.4 % — SIGNIFICANT CHANGE UP (ref 43–77)
OVALOCYTES BLD QL SMEAR: SLIGHT — SIGNIFICANT CHANGE UP
PLAT MORPH BLD: NORMAL — SIGNIFICANT CHANGE UP
PLATELET # BLD AUTO: 174 K/UL — SIGNIFICANT CHANGE UP (ref 150–400)
POIKILOCYTOSIS BLD QL AUTO: SLIGHT — SIGNIFICANT CHANGE UP
POLYCHROMASIA BLD QL SMEAR: SLIGHT — SIGNIFICANT CHANGE UP
POTASSIUM SERPL-MCNC: 4.5 MMOL/L — SIGNIFICANT CHANGE UP (ref 3.5–5.3)
POTASSIUM SERPL-SCNC: 4.5 MMOL/L — SIGNIFICANT CHANGE UP (ref 3.5–5.3)
PROT SERPL-MCNC: 7.6 G/DL — SIGNIFICANT CHANGE UP (ref 6–8.3)
PROTHROM AB SERPL-ACNC: 12.7 SEC — SIGNIFICANT CHANGE UP (ref 10–12.9)
RBC # BLD: 4.75 M/UL — SIGNIFICANT CHANGE UP (ref 4.2–5.8)
RBC # FLD: 14.3 % — SIGNIFICANT CHANGE UP (ref 10.3–14.5)
RBC BLD AUTO: ABNORMAL
SODIUM SERPL-SCNC: 133 MMOL/L — LOW (ref 135–145)
TROPONIN T, HIGH SENSITIVITY RESULT: 15 NG/L — SIGNIFICANT CHANGE UP (ref 0–51)
WBC # BLD: 5.2 K/UL — SIGNIFICANT CHANGE UP (ref 3.8–10.5)
WBC # FLD AUTO: 5.2 K/UL — SIGNIFICANT CHANGE UP (ref 3.8–10.5)

## 2018-11-27 PROCEDURE — 70498 CT ANGIOGRAPHY NECK: CPT | Mod: 26

## 2018-11-27 PROCEDURE — 99291 CRITICAL CARE FIRST HOUR: CPT

## 2018-11-27 PROCEDURE — 70496 CT ANGIOGRAPHY HEAD: CPT | Mod: 26

## 2018-11-27 PROCEDURE — 93010 ELECTROCARDIOGRAM REPORT: CPT

## 2018-11-27 PROCEDURE — 70450 CT HEAD/BRAIN W/O DYE: CPT | Mod: 26,59

## 2018-11-27 RX ORDER — ASPIRIN/CALCIUM CARB/MAGNESIUM 324 MG
81 TABLET ORAL ONCE
Qty: 0 | Refills: 0 | Status: COMPLETED | OUTPATIENT
Start: 2018-11-27 | End: 2018-11-27

## 2018-11-27 RX ORDER — TAMSULOSIN HYDROCHLORIDE 0.4 MG/1
0.4 CAPSULE ORAL AT BEDTIME
Qty: 0 | Refills: 0 | Status: DISCONTINUED | OUTPATIENT
Start: 2018-11-27 | End: 2018-11-29

## 2018-11-27 RX ORDER — ATORVASTATIN CALCIUM 80 MG/1
80 TABLET, FILM COATED ORAL AT BEDTIME
Qty: 0 | Refills: 0 | Status: DISCONTINUED | OUTPATIENT
Start: 2018-11-27 | End: 2018-11-29

## 2018-11-27 RX ORDER — ASPIRIN/CALCIUM CARB/MAGNESIUM 324 MG
1 TABLET ORAL
Qty: 0 | Refills: 0 | COMMUNITY

## 2018-11-27 RX ORDER — ACETAMINOPHEN 500 MG
975 TABLET ORAL DAILY
Qty: 0 | Refills: 0 | Status: DISCONTINUED | OUTPATIENT
Start: 2018-11-27 | End: 2018-11-28

## 2018-11-27 RX ORDER — ATORVASTATIN CALCIUM 80 MG/1
80 TABLET, FILM COATED ORAL ONCE
Qty: 0 | Refills: 0 | Status: COMPLETED | OUTPATIENT
Start: 2018-11-27 | End: 2018-11-27

## 2018-11-27 RX ORDER — HEPARIN SODIUM 5000 [USP'U]/ML
5000 INJECTION INTRAVENOUS; SUBCUTANEOUS EVERY 12 HOURS
Qty: 0 | Refills: 0 | Status: DISCONTINUED | OUTPATIENT
Start: 2018-11-27 | End: 2018-11-29

## 2018-11-27 RX ORDER — ASPIRIN/CALCIUM CARB/MAGNESIUM 324 MG
81 TABLET ORAL DAILY
Qty: 0 | Refills: 0 | Status: DISCONTINUED | OUTPATIENT
Start: 2018-11-27 | End: 2018-11-29

## 2018-11-27 RX ORDER — FOLIC ACID 0.8 MG
1 TABLET ORAL DAILY
Qty: 0 | Refills: 0 | Status: DISCONTINUED | OUTPATIENT
Start: 2018-11-27 | End: 2018-11-29

## 2018-11-27 RX ADMIN — Medication 81 MILLIGRAM(S): at 11:29

## 2018-11-27 RX ADMIN — ATORVASTATIN CALCIUM 80 MILLIGRAM(S): 80 TABLET, FILM COATED ORAL at 11:29

## 2018-11-27 RX ADMIN — Medication 1 MILLIGRAM(S): at 21:03

## 2018-11-27 RX ADMIN — TAMSULOSIN HYDROCHLORIDE 0.4 MILLIGRAM(S): 0.4 CAPSULE ORAL at 21:03

## 2018-11-27 RX ADMIN — HEPARIN SODIUM 5000 UNIT(S): 5000 INJECTION INTRAVENOUS; SUBCUTANEOUS at 17:45

## 2018-11-27 RX ADMIN — ATORVASTATIN CALCIUM 80 MILLIGRAM(S): 80 TABLET, FILM COATED ORAL at 21:03

## 2018-11-27 RX ADMIN — Medication 81 MILLIGRAM(S): at 23:56

## 2018-11-27 RX ADMIN — Medication 975 MILLIGRAM(S): at 16:01

## 2018-11-27 NOTE — ED ADULT NURSE REASSESSMENT NOTE - NS ED NURSE REASSESS COMMENT FT1
attempted to call stroke unit for report- "no bed available" as per stroke unit.  report given to WILLIE Chin RN for change of shift.

## 2018-11-27 NOTE — H&P ADULT - NSHPLABSRESULTS_GEN_ALL_CORE
Other:    11-27    133<L>  |  103  |  20  ----------------------------<  119<H>  4.5   |  19<L>  |  1.16    Ca    8.9      27 Nov 2018 10:11    TPro  7.6  /  Alb  3.8  /  TBili  0.8  /  DBili  x   /  AST  15  /  ALT  8<L>  /  AlkPhos  72  11-27                            10.4   5.2   )-----------( 174      ( 27 Nov 2018 10:11 )             31.2       Radiology    CT:   MRI  EKG:  tele:  TTE:  EEG:

## 2018-11-27 NOTE — DISCHARGE NOTE ADULT - NS AS DC FOLLOWUP STROKE INST
Stroke (includes: TIA/SAH/ICH/Ischemic Stroke)/Influenza vaccination (VIS Pub Date: August 7, 2015) Influenza vaccination (VIS Pub Date: August 7, 2015) Influenza vaccination (VIS Pub Date: August 7, 2015)/Stroke (includes: TIA/SAH/ICH/Ischemic Stroke)

## 2018-11-27 NOTE — ED PROVIDER NOTE - OBJECTIVE STATEMENT
84 yo M c PMH of CAD s/p CABG x 3 and a-thalassemia trait, HTN p/w 6 hour hx of feeling like the room is spinning and unsteady gait. Pt reports he woke up to urinate at 4am today, had unsteady gait that was still present when he went to sleep and still present when he woke up at 8am today, pt states the unsteady gait is persisting now however his dizziness has resolved. Only hx of sx was remote 1 y/a when he fell, no w/u for these sx. last know normal was 12am when pt woke up and was walking normally, asx at home. at baseline pt lives alone and walks and lives independently. pt stopped taking ASA 81 q day 30 y/a, is not on any blood thinners. no hx of cardiac arrhythmia or afib. denies fall or head trauma.     ROS positive: dizziness, ataxia  ROS negative: f/c, congestion, coryza, pharyngitis, cough, hemotpysis, n/v, dysuria, hematuria, leg edema, fall, head trauma

## 2018-11-27 NOTE — H&P ADULT - HISTORY OF PRESENT ILLNESS
POLA [id number] is a 85y old  Male who presents with a chief complaint of     HPI: 86 y/o [place]  male (not on aspirin) with past medical history of HTN, HLD, CAD s/p CABG, alpha-thalassemia presents to the ED as Code Stroke for dizziness and unsteady gait. Patient states he was completely normal before going to bed around 9pm on . Patient woke up around 12AM () to use the bathroom and reported no issues and was, again, completely normal. Patient then awoke for a 2nd time at 4am to use the bathroom at which point he felt room spinning dizziness as well unsteadiness while walking but attributed it to being the middle of the night and went back to bed. Patient then awoke at 8am and reported the dizziness persisted and patient continued to have difficulty ambulating and felt off balance. Patient was then brought to ED where he reported the dizziness improved but continued to have difficulty ambulating. Patient reports his symptoms never completely resolved and he never felt normal since waking up at 4am with dizziness and unsteady gait. LKN: 12am ().   Patient denies any headache, nausea, vomiting, double vision, blurry vision, numbness, tingling, unilateral weakness.    At baseline, patient ambulates with assistance and lives by himself and is fully independent in ADL's. Of note, patient's family reporting 2 episodes of dysphagia to solids over the last 6 months. At this time, patient reports no difficulty swallowing liquids or solids.           MEDICATIONS  (STANDING):  aspirin  chewable 81 milliGRAM(s) Oral once  atorvastatin 80 milliGRAM(s) Oral once    MEDICATIONS  (PRN):    PAST MEDICAL & SURGICAL HISTORY:  Alpha thalassemia  HTN (hypertension)  Dyslipidemia  Myocardial infarction:   CAD (coronary artery disease)  S/P CAB in [facility]    FAMILY HISTORY:  No pertinent family history in first degree relatives    Allergies    No Known Allergies    Intolerances        SHx - No smoking, No ETOH, No drug abuse

## 2018-11-27 NOTE — H&P ADULT - ATTENDING COMMENTS
I have seen and examined this patient with the stroke neurology team.     History was reviewed with the patient and/or available family members.   ROS: All negative except documented in the HPI.   Neurological exam was performed and agree with exam as documented above.   Laboratory results and imaging studies were reviewed by me.   I agree with the neurology resident note as documented above.    85 years old man with multiple vascular risk factors including age, HTN, HPLD, CAD s/p CABG and alpha thalassemia is evaluated at [facility] for "dizziness". On 11/27, he reports to have noted dizziness best described as lightheadedness upon waking up and getting out of the bed at night. He reports noticed multiple episodes of lightheadedness since then, all provoked by standing up from lying down position. Of note, he reports to have started on tamsulosin recently. Neurological exam today is nonfocal. CT per admission and subsequent MRI brain did not show any evidence of acute infarct or hemorrhage. 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 Orthostatic vitals examination showed significant decrease in SBP and DBP with change in the posture and appropriate. Heart rate response.    Impression:  Dizziness-lightheadedness - likely etiology being orthostatic hypotension  Newly diagnosed atrial fibrillation    Plan:  - Therapeutic anticoagulation ([id number] score>1 and probable non-valvular atrial fibrillation) with DOACs like apixaban for primary ischemic stroke prevention, indefinitely. If not contraindicated due to any specific reasons in the future. Risks versus benefits and adverse reactions/complications associated with therapeutic anticoagulation with apixaban, including paradoxically increased risk of stroke or MI upon abrupt discontinuation of the medication were discussed with him in detail.  - Agree to continue with aspirin in addition to therapeutic anticoagulation due to his history of CAD and CABG   - No indications for pharmacological DVT prophylaxis as patient is on therapeutic anticoagulation   - Continue his statin medication as per home dose, if applicable   - HbA1C and LDL pending, continue with aggressive vascular risk factors modifications   - BP goal - gradual normotension   - TTE and continue with telemetry   - Would hold tamsulosin for now and have advised him to followup with urologist as outpatient to choose other appropriate medications without orthostatic hypotension properties  - PT/OT/Speech and swallow/safety eval  - Stroke education    Above mentioned plan was discussed with patient in detail. All the questions were answered and concerns were addressed.

## 2018-11-27 NOTE — ED PROVIDER NOTE - PHYSICAL EXAMINATION
NEURO: pupils 2 mm, PERRL, EOMI (CN III, IV, VI), facial sensation intact to light touch in all 3 divisions bilat (CN V), face is symmetric with normal eye closure, eye opening, and smile (CN VII), hearing is normal to rubbing fingers (CN VII), palate elevates symmetrically, phonation is normal (CN IX, X),  shoulder shrug intact bilat (CN XI), tongue is midline with nl movements and no atrophy (CN XII), finger to nose test nl bilat, negative pronator drift bilat, speech is clear; 5/5 motor strength BUE and BLE: deltoids, biceps, triceps, wrist flexors/extensors, hand , hip flexors, knee flexors/extensors, plantar/dorsiflexors, hallux flexors/extensors; sensation intact to light touch BUE and BLE: C5-T1 and L3-S1     gait: GROSSLY ATAXIC GAIT

## 2018-11-27 NOTE — H&P ADULT - ASSESSMENT
86 y/o [place]  male (not on aspirin) with past medical history of HTN, HLD, CAD s/p CABG, alpha-thalassemia presents to the ED as Code Stroke for dizziness and unsteady gait. Patient reportedly went to bed 9pm (11/26), awoke at 12am (11/27) completely normal and awoke again at 4am (11/27) with room spinning dizziness and unsteady gait. LKN: 12am (11/27). CT head verbal read demonstrates no acute infarct or hemorrhage. CTA h/n verbal read demonstrates no large vessel occlusion or thrombus. Of note, as per ED team, patient noted to have atrial fibrillation on EKG. Neurologic exam remarkable for ataxic gait. NIHSS 1. Pre-MRS 0. tPA not given as patient out of the window. Patient not an endovascular candidate given no large vessel occlusion and low NIHSS.     Impression: Gait Ataxia possibly 2/2 Cerebellar dysfunction possibly 2/2 stroke of cryptogenic etiology; r/o cardioembolism    Recommendations:   Permissive HTN up to 220/120 mmHg for first 48-72 hours after the symptom onset followed by gradual normotension  MRI brain without contrast  TTE with bubble study for possible valvular heart disease  ASA 81 mg PO QD once patient passes swallow evaluation, if not,  NC  Lipitor 80 mg PO QHS  DVT prophylaxis with heparin/lovenox  HbA1c, LDL and continue with aggressive vascular risk factors modifications   NPO until patient passes dysphagia screen  Tele monitor  PT/OT/S&S eval  Cardiology consult    Plan discussed with Stroke Attending.

## 2018-11-27 NOTE — ED ADULT NURSE REASSESSMENT NOTE - NS ED NURSE REASSESS COMMENT FT1
0955: CODE STROKE called.   0956: neuro at bedside in room 34., MD Mayfield (neuro resident) states he would like to speak to pt prior to CT scan.   1000: upon reassessment, pt is "outside of TPA window"- "code stroke cancelled" as per MD Araya. needs CTA, no IV access at this time due to difficult IV access, two RN's attempted several times, MD Araya attempted with no success. ED ultrasound team contacted for IV access.  1015: 20G inserted in L forearm by ED RN.  1017: pt transported to CT scan with neuro at bedside.

## 2018-11-27 NOTE — ED PROVIDER NOTE - MEDICAL DECISION MAKING DETAILS
84 yo M c PMH of CAD s/p CABG x 3 and a-thalassemia trait, HTN p/w 6 hour hx of feeling like the room is spinning and unsteady gait. last know normal 12am today. On exam, HR 57, afib on EKG vs otherwise wnl, grossly ataxic gait neuro exam otherwise non-focal. code stroke called neuro at bedside pt going for CT to assess for posterior stroke, will reassess. Marshal: 86 yo M c PMH of CAD s/p CABG x 3 and a-thalassemia trait, HTN p/w 6 hour hx of feeling like the room is spinning and unsteady gait. last know normal 12am today. On exam, HR 57, afib on EKG vs otherwise wnl, grossly ataxic gait neuro exam otherwise non-focal. code stroke called neuro at bedside pt going for CT to assess for posterior stroke, will reassess.

## 2018-11-27 NOTE — DISCHARGE NOTE ADULT - PLAN OF CARE
to prevent future strokes - continue medications as above  - follow up with your PCP regarding resuming tamsulosin, stopped in the hospital due to symptomatic presumed drop in BP from it.

## 2018-11-27 NOTE — ED ADULT NURSE NOTE - NSIMPLEMENTINTERV_GEN_ALL_ED
Implemented All Fall with Harm Risk Interventions:  [place] to call system. Call bell, personal items and telephone within reach. Instruct patient to call for assistance. Room bathroom lighting operational. Non-slip footwear when patient is off stretcher. Physically safe environment: no spills, clutter or unnecessary equipment. Stretcher in lowest position, wheels locked, appropriate side rails in place. Provide visual cue, wrist band, yellow gown, etc. Monitor gait and stability. Monitor for mental status changes and reorient to person, place, and time. Review medications for side effects contributing to fall risk. Reinforce activity limits and safety measures with patient and family. Provide visual clues: red socks.

## 2018-11-27 NOTE — ED PROVIDER NOTE - PMH
Alpha thalassemia    CAD (coronary artery disease)    Dyslipidemia    HTN (hypertension)    Myocardial infarction  2014

## 2018-11-27 NOTE — ED PROVIDER NOTE - CRITICAL CARE PROVIDED
additional history taking/consult w/ pt's family directly relating to pts condition/direct patient care (not related to procedure)/interpretation of diagnostic studies/documentation/consultation with other physicians

## 2018-11-27 NOTE — DISCHARGE NOTE ADULT - MEDICATION SUMMARY - MEDICATIONS TO TAKE
I will START or STAY ON the medications listed below when I get home from the hospital:    aspirin 81 mg oral delayed release tablet  -- 1 tab(s) by mouth once a day  Note: per pt was on hold 1-2 months prior due to gum bleed; restarted today 11/27/18  -- Indication: For CAD s/p stents    lisinopril 10 mg oral tablet  -- 1 tab(s) by mouth once a day  -- Indication: For CVA (cerebral vascular accident)    Eliquis 5 mg oral tablet  -- 1 tab(s) by mouth every 12 hours  -- Indication: For CVA (cerebral vascular accident)    simvastatin 20 mg oral tablet  -- 1 tab(s) by mouth once a day (at bedtime)  -- Indication: For HLD I will START or STAY ON the medications listed below when I get home from the hospital:    aspirin 81 mg oral delayed release tablet  -- 1 tab(s) by mouth once a day  Note: per pt was on hold 1-2 months prior due to gum bleed; restarted today 11/27/18  -- Indication: For Stents, CAD    lisinopril 10 mg oral tablet  -- 1 tab(s) by mouth once a day  -- Indication: For CVA (cerebral vascular accident)    Eliquis 5 mg oral tablet  -- 1 tab(s) by mouth every 12 hours  -- Indication: For CVA (cerebral vascular accident)    simvastatin 20 mg oral tablet  -- 1 tab(s) by mouth once a day (at bedtime)  -- Indication: For HLD    Metoprolol Succinate ER 25 mg oral tablet, extended release  -- 1 tab(s) by mouth once a day   -- It is very important that you take or use this exactly as directed.  Do not skip doses or discontinue unless directed by your doctor.  May cause drowsiness.  Alcohol may intensify this effect.  Use care when operating dangerous machinery.  Some non-prescription drugs may aggravate your condition.  Read all labels carefully.  If a warning appears, check with your doctor before taking.  Swallow whole.  Do not crush.  Take with food or milk.  This drug may impair the ability to drive or operate machinery.  Use care until you become familiar with its effects.    -- Indication: For HTN (hypertension)

## 2018-11-27 NOTE — H&P ADULT - NSHPPHYSICALEXAM_GEN_ALL_CORE
Vital Signs Last 24 Hrs  T(C): 36.4 (27 Nov 2018 09:33), Max: 36.4 (27 Nov 2018 09:33)  T(F): 97.6 (27 Nov 2018 09:33), Max: 97.6 (27 Nov 2018 09:33)  HR: 63 (27 Nov 2018 10:30) (57 - 71)  BP: 169/66 (27 Nov 2018 10:32) (144/65 - 169/66)  BP(mean): --  RR: 18 (27 Nov 2018 10:25) (16 - 18)  SpO2: 100% (27 Nov 2018 09:33) (97% - 100%)    General Exam:   General appearance: No acute distress             Neurological Exam:  Mental Status: Orientated to self, date and place.  Attention intact.  No dysarthria. Speech fluent.  Cranial Nerves:   PERRL, EOMI, VFF, no nystagmus.  CN V1-3 intact to light touch.  No facial asymmetry. Tongue, uvula and palate midline.  Sternocleidomastoid and Trapezius intact bilaterally.    Motor:   Tone: normal.                  Strength:     [] Upper extremity                                                                     R        No drift                                               L         No drift  [] Lower extremity                                                                      R       No drift                                               L        No drift  Pronator drift: none                 Dysmetria: None to finger-nose-finger or heel-shin-heel  +truncal ataxia.    Tremor: No resting, postural or action tremor.  No myoclonus.    Sensation: intact to light touch, temperature    Gait: +Ataxic gait. Unsteady upon standing. 07-Feb-2018 09-Feb-2018

## 2018-11-27 NOTE — DISCHARGE NOTE ADULT - MEDICATION SUMMARY - MEDICATIONS TO STOP TAKING
I will STOP taking the medications listed below when I get home from the hospital:    metoprolol succinate 100 mg oral tablet, extended release  -- 1 tab(s) by mouth once a day    Zocor 20 mg oral tablet  -- 1 tab(s) by mouth once a day (at bedtime)    benzonatate 100 mg oral capsule  -- 1 cap(s) by mouth 4 times a day, As needed, Cough    Ocuvite Lutein oral tablet  -- 1 tab(s) by mouth once a day    tamsulosin 0.4 mg oral capsule  -- 1 cap(s) by mouth once a day    Keflex 500 mg oral capsule  -- 1 cap(s) by mouth every 12 hours   -- Finish all this medication unless otherwise directed by prescriber.

## 2018-11-27 NOTE — DISCHARGE NOTE ADULT - HOSPITAL COURSE
HPI:   85 year old man with multiple vascular risk factors including age, HTN, HPLD, CAD s/p CABG and alpha thalassemia was evaluated at [facility] for "dizziness". He reported he noticed multiple episodes of lightheadedness since then, all provoked by standing up from lying down position. Of note, he reports to have started on tamsulosin recently.  CT per admission and subsequent MRI brain did not show any evidence of acute infarct or hemorrhage. 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 Orthostatic vitals examination showed significant decrease in SBP and DBP with change in the posture and appropriate. Heart rate response. NIHSS 1. Pre-MRS 0 on admission.  Impression:  Dizziness-lightheadedness - likely etiology being orthostatic hypotension  Newly diagnosed atrial fibrillation    NEURO: Neurologically? Continue monitoring for neurologic deterioration, Allow for gradual normotension- started on home BP meds, LDL 59 - continue on home statin, MRI Brain w/o contrast noted above. Physical therapy/OT recommended home.     Pt was admitted to the Stroke Service for further evaluation.     Hospital Course:   Was found ok EKF to have new onset Afib, started on eliquis 5 BID  Pt was started on Asa 81mg, continued on lisinopril 10, simvastatin 20 mg. Flomax discontinued due to suspected orthostatic hypotension as the cause for the symptoms.     MRI Head No Cont (11.28.18)  Atrophy and small vessel white matter ischemic changes. 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 No acute infarcts, hemorrhage or mass.    Brain CT (11.27.18): No acute hemorrhage or major distribution infarct.  Neck CTA (11.27.18): Mild narrowing of the internal carotid arteries bilaterally with calcified and noncalcified plaque and mild ulceration within the noncalcified plaque bilaterally.  Brain CTA (11.27.18): No major vessel occlusion or stenosis.    A1c was 5.7;  TTE was notable for READ PENDING  Cardiology recommended TTE and to be started on ASA 81 and eliquis 5 mg BID    Etiology of pt’s CVA is likely cardioembolic vs hypotension from Flomas    Pt was seen and evaluated by OT/PT/Speech and Swallow, who recommended regular diet  Speech and Swallow recommended home    Pt is currently medically stable for discharge    Discharge Plan:    Please continue all meds as listed above.    Follow-up: with your PCP, with Elaine Durant NP for stroke follow up. phone number above. HPI:   85 year old man with multiple vascular risk factors including age, HTN, HPLD, CAD s/p CABG and alpha thalassemia was evaluated at [facility] for "dizziness". He reported he noticed multiple episodes of lightheadedness since then, all provoked by standing up from lying down position. Of note, he reports to have started on tamsulosin recently.  CT per admission and subsequent MRI brain did not show any evidence of acute infarct or hemorrhage. 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 Orthostatic vitals examination showed significant decrease in SBP and DBP with change in the posture and appropriate. Heart rate response. NIHSS 1. Pre-MRS 0 on admission.  Impression:  Dizziness-lightheadedness - likely etiology being orthostatic hypotension  Newly diagnosed atrial fibrillation    NEURO: Neurologically? Continue monitoring for neurologic deterioration, Allow for gradual normotension- started on home BP meds, LDL 59 - continue on home statin, MRI Brain w/o contrast noted above. Physical therapy/OT recommended home.     Pt was admitted to the Stroke Service for further evaluation.     Hospital Course:   Was found ok EKF to have new onset Afib, started on eliquis 5 BID  Pt was started on Asa 81mg, continued on lisinopril 10, simvastatin 20 mg. Flomax discontinued due to suspected orthostatic hypotension as the cause for the symptoms.     MRI Head No Cont (11.28.18)  Atrophy and small vessel white matter ischemic changes. 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 No acute infarcts, hemorrhage or mass.    Brain CT (11.27.18): No acute hemorrhage or major distribution infarct.  Neck CTA (11.27.18): Mild narrowing of the internal carotid arteries bilaterally with calcified and noncalcified plaque and mild ulceration within the noncalcified plaque bilaterally.  Brain CTA (11.27.18): No major vessel occlusion or stenosis.    A1c was 5.7;  TTE was unremarkable, EF 67%  Cardiology recommended TTE and to be started on ASA 81 and eliquis 5 mg BID    Etiology of pt’s CVA is likely cardioembolic vs hypotension from Flomax    Pt was seen and evaluated by OT/PT/Speech and Swallow, who recommended regular diet  Speech and Swallow recommended home    Pt is currently medically stable for discharge    Discharge Plan:    Please continue all meds as listed above.    Follow-up: with your PCP, with Elaine Durant NP for stroke follow up. phone number above.

## 2018-11-27 NOTE — DISCHARGE NOTE ADULT - CARE PLAN
Principal Discharge DX:	Cerebrovascular accident (CVA), unspecified mechanism  Goal:	to prevent future strokes  Assessment and plan of treatment:	- continue medications as above  - follow up with your PCP regarding resuming tamsulosin, stopped in the hospital due to symptomatic presumed drop in BP from it.

## 2018-11-27 NOTE — ED PROVIDER NOTE - PROGRESS NOTE DETAILS
Zeyad PINEDO: CTs show no acute bleed pt outside window for tPA will admit to neurology for further w/u Zeyad PINEDO: case discussed with Stroke Team, CTs show no acute bleed pt outside window for tPA will admit to neurology for further w/u Code stroke was called given concern for posterior circulation stroke

## 2018-11-27 NOTE — ED ADULT NURSE REASSESSMENT NOTE - NS ED NURSE REASSESS COMMENT FT1
Pt received from RN Cassy in red. Pt AOx3 breathing even unlabored spontaneously, no facial droop noted, strength equal in all extremities, 3mm PERRLA, gross neuro intact. Pt NAD, VSS. Family at bedside. No bed available at this time on stroke unit.

## 2018-11-27 NOTE — DISCHARGE NOTE ADULT - NS AS DC STROKE ED MATERIALS
Prescribed Medications/Need for Followup After Discharge/Stroke Warning Signs and Symptoms/Stroke Education Booklet/Risk Factors for Stroke/Call 911 for Stroke Risk Factors for Stroke/Prescribed Medications/Need for Followup After Discharge/Stroke Education Booklet/Stroke Warning Signs and Symptoms/Call 911 for Stroke

## 2018-11-27 NOTE — ED ADULT NURSE NOTE - OBJECTIVE STATEMENT
84 yo presents to the ED from home with family at bedside via EMS. A&Ox4 c/o dizziness. pt reports that at 0300 this morning he awoke needing to urinate (reports "prostate problem, im up a few times throughout the night urinating"). pt reports that when he first got out of bed he felt very dizzy "like the room was spinning". pt reports sensation went away after a few moments. pt denies any CP, SOB and diaphoresis at that time. pt reports he woke up this morning with the same sensation. pt states he called his son and granddaughter who are MD's and encouraged him to present to the ED. pt reports "feeling good" currently. pt denies any complaints upon assessment. gross neuro is intact, PMS x4, PERRL WNL, no deficits upon assessment. EKG done at bedside. pt denies any CP, SOB. pt placed on CM. PMH of HTN, HLD, CAD, with hx of CABG. pt arrives with 20G in RAC, no blood return, difficulty flushing with saline, IV lock removed. denies recent fever, chills, n/v, abd pain. denies recent trauma. 86 yo presents to the ED from home with family at bedside via EMS. A&Ox4 c/o dizziness. pt reports that at 0300 this morning he awoke needing to urinate (reports "prostate problem, im up a few times throughout the night urinating"). pt reports that when he first got out of bed he felt very dizzy "like the room was spinning" with unsteady gait. pt reports sensation went away after a few moments. pt denies any CP, SOB and diaphoresis at that time. pt reports he woke up this morning with the same sensation. pt states he called his son and granddaughter who are MD's and encouraged him to present to the ED. pt reports "feeling good" currently. pt denies any complaints upon assessment. gross neuro is intact, PMS x4, PERRL WNL, pt remains to have unsteady gait upon assessment. EKG done at bedside. pt denies any CP, SOB. pt placed on CM. PMH of HTN, HLD, CAD, with hx of CABG. pt arrives with 20G in RAC, no blood return, difficulty flushing with saline, IV lock removed. peripheral IV attempted by two ED RN's several times with no success, pt states "im a difficult stick". denies recent fever, chills, n/v, abd pain. denies recent trauma.

## 2018-11-27 NOTE — DISCHARGE NOTE ADULT - PATIENT PORTAL LINK FT
You can access the [facility], offered by [facility], by registering with the following website: [email or web address]

## 2018-11-28 DIAGNOSIS — I25.10 ATHEROSCLEROTIC HEART DISEASE OF NATIVE CORONARY ARTERY WITHOUT ANGINA PECTORIS: ICD-10-CM

## 2018-11-28 DIAGNOSIS — I10 ESSENTIAL (PRIMARY) HYPERTENSION: ICD-10-CM

## 2018-11-28 DIAGNOSIS — I63.9 CEREBRAL INFARCTION, UNSPECIFIED: ICD-10-CM

## 2018-11-28 DIAGNOSIS — I48.91 UNSPECIFIED ATRIAL FIBRILLATION: ICD-10-CM

## 2018-11-28 LAB
ANION GAP SERPL CALC-SCNC: 10 MMOL/L — SIGNIFICANT CHANGE UP (ref 5–17)
BUN SERPL-MCNC: 18 MG/DL — SIGNIFICANT CHANGE UP (ref 7–23)
CALCIUM SERPL-MCNC: 8.7 MG/DL — SIGNIFICANT CHANGE UP (ref 8.4–10.5)
CHLORIDE SERPL-SCNC: 105 MMOL/L — SIGNIFICANT CHANGE UP (ref 96–108)
CHOLEST SERPL-MCNC: 118 MG/DL — SIGNIFICANT CHANGE UP (ref 10–199)
CO2 SERPL-SCNC: 21 MMOL/L — LOW (ref 22–31)
CREAT SERPL-MCNC: 1.17 MG/DL — SIGNIFICANT CHANGE UP (ref 0.5–1.3)
ESTIMATED AVERAGE GLUCOSE: 117 MG/DL — HIGH (ref 68–114)
GLUCOSE SERPL-MCNC: 109 MG/DL — HIGH (ref 70–99)
HBA1C BLD-MCNC: 5.7 % — HIGH (ref 4–5.6)
HBA1C BLD-MCNC: 5.7 % — HIGH (ref 4–5.6)
HCT VFR BLD CALC: 29.1 % — LOW (ref 39–50)
HDLC SERPL-MCNC: 36 MG/DL — LOW
HGB BLD-MCNC: 9.7 G/DL — LOW (ref 13–17)
LIPID PNL WITH DIRECT LDL SERPL: 69 MG/DL — SIGNIFICANT CHANGE UP
MAGNESIUM SERPL-MCNC: 2 MG/DL — SIGNIFICANT CHANGE UP (ref 1.6–2.6)
MCHC RBC-ENTMCNC: 21.3 PG — LOW (ref 27–34)
MCHC RBC-ENTMCNC: 33.3 GM/DL — SIGNIFICANT CHANGE UP (ref 32–36)
MCV RBC AUTO: 64 FL — LOW (ref 80–100)
PHOSPHATE SERPL-MCNC: 2.7 MG/DL — SIGNIFICANT CHANGE UP (ref 2.5–4.5)
PLATELET # BLD AUTO: 163 K/UL — SIGNIFICANT CHANGE UP (ref 150–400)
POTASSIUM SERPL-MCNC: 4.5 MMOL/L — SIGNIFICANT CHANGE UP (ref 3.5–5.3)
POTASSIUM SERPL-SCNC: 4.5 MMOL/L — SIGNIFICANT CHANGE UP (ref 3.5–5.3)
RBC # BLD: 4.55 M/UL — SIGNIFICANT CHANGE UP (ref 4.2–5.8)
RBC # FLD: 16 % — HIGH (ref 10.3–14.5)
SODIUM SERPL-SCNC: 136 MMOL/L — SIGNIFICANT CHANGE UP (ref 135–145)
TOTAL CHOLESTEROL/HDL RATIO MEASUREMENT: 3.3 RATIO — LOW (ref 3.4–9.6)
TRIGL SERPL-MCNC: 66 MG/DL — SIGNIFICANT CHANGE UP (ref 10–149)
TROPONIN T, HIGH SENSITIVITY RESULT: 16 NG/L — SIGNIFICANT CHANGE UP (ref 0–51)
WBC # BLD: 4.27 K/UL — SIGNIFICANT CHANGE UP (ref 3.8–10.5)
WBC # FLD AUTO: 4.27 K/UL — SIGNIFICANT CHANGE UP (ref 3.8–10.5)

## 2018-11-28 PROCEDURE — 99223 1ST HOSP IP/OBS HIGH 75: CPT | Mod: GC

## 2018-11-28 PROCEDURE — 70551 MRI BRAIN STEM W/O DYE: CPT | Mod: 26

## 2018-11-28 RX ORDER — ACETAMINOPHEN 500 MG
975 TABLET ORAL
Qty: 0 | Refills: 0 | Status: DISCONTINUED | OUTPATIENT
Start: 2018-11-28 | End: 2018-11-29

## 2018-11-28 RX ORDER — LISINOPRIL 2.5 MG/1
10 TABLET ORAL DAILY
Qty: 0 | Refills: 0 | Status: DISCONTINUED | OUTPATIENT
Start: 2018-11-28 | End: 2018-11-29

## 2018-11-28 RX ADMIN — LISINOPRIL 10 MILLIGRAM(S): 2.5 TABLET ORAL at 19:48

## 2018-11-28 RX ADMIN — HEPARIN SODIUM 5000 UNIT(S): 5000 INJECTION INTRAVENOUS; SUBCUTANEOUS at 18:28

## 2018-11-28 RX ADMIN — Medication 1 MILLIGRAM(S): at 12:30

## 2018-11-28 RX ADMIN — Medication 81 MILLIGRAM(S): at 12:30

## 2018-11-28 RX ADMIN — Medication 975 MILLIGRAM(S): at 06:20

## 2018-11-28 RX ADMIN — Medication 975 MILLIGRAM(S): at 06:50

## 2018-11-28 RX ADMIN — HEPARIN SODIUM 5000 UNIT(S): 5000 INJECTION INTRAVENOUS; SUBCUTANEOUS at 05:06

## 2018-11-28 RX ADMIN — ATORVASTATIN CALCIUM 80 MILLIGRAM(S): 80 TABLET, FILM COATED ORAL at 21:18

## 2018-11-28 NOTE — PHYSICAL THERAPY INITIAL EVALUATION ADULT - BALANCE TRAINING, PT EVAL
GOAL: Pt will increase static/ dynamic standing balance to Good- with straight cane to improve safety with functional activities in 4 weeks.

## 2018-11-28 NOTE — PHYSICAL THERAPY INITIAL EVALUATION ADULT - PERTINENT HX OF CURRENT PROBLEM, REHAB EVAL
86 yo M p/w 6 hour hx of feeling like the room is spinning and unsteady gait. Pt reports he woke up to urinate at 4am today, had unsteady gait that was still present when he woke up again at 8am, pt states the unsteady gait is persisting now however his dizziness has resolved. CT Brain/neck 11/28: No acute hemorrhage or major distribution infarct. Mild narrowing of the internal carotid arteries b/l with calcified and noncalcified plaque and mild ulceration within the noncalcified plaque b/l. 86 yo M p/w 6 hour hx of feeling like the room is spinning and unsteady gait. Pt reports he woke up to urinate at 4am today, had unsteady gait that was still present when he woke up again at 8am, pt states the unsteady gait is persisting now however his dizziness has resolved. Pt found to be in A-fib, suspected to have had CVA. CT Brain 11/28: No acute hemorrhage or major distribution infarct.

## 2018-11-28 NOTE — PHYSICAL THERAPY INITIAL EVALUATION ADULT - DISCHARGE DISPOSITION, PT EVAL
home w/ assist/assist rec for all functional mobility; pt states he will be able to have someone take him to PT/home w/ outpatient services

## 2018-11-28 NOTE — PHYSICAL THERAPY INITIAL EVALUATION ADULT - ADDITIONAL COMMENTS
pt lives in private home alone, 3 steps to enter, 1 flight of stairs to bedroom +HR. Per pt, he amb without AD or assist prior to admission. HHA Tues-Friday 24hrs to assist with cooking/cleaning/errands.

## 2018-11-28 NOTE — CONSULT NOTE ADULT - PROBLEM SELECTOR RECOMMENDATION 3
Monitor on tele for now   Overall rate is well controlled   Not uncommon to have up to 3-5 second pauses in Afib. Remains asymptomatic   No indication for PPM at this time   check thyroid panel   TTE

## 2018-11-28 NOTE — PROVIDER CONTACT NOTE (OTHER) - ACTION/TREATMENT ORDERED:
PA ordered CT scan to be done PA ordered CT scan to be done.  Pts daughter Shruti refused CT, she would rather wait for MRI to be done because CT was done yesterday. DANIE Fontaine notified.

## 2018-11-28 NOTE — PHYSICAL THERAPY INITIAL EVALUATION ADULT - PLANNED THERAPY INTERVENTIONS, PT EVAL
GOAL: Pt will negotiate 10 steps with 1 HR and step to pattern independently in 4 weeks./balance training/transfer training/bed mobility training/gait training

## 2018-11-28 NOTE — PROVIDER CONTACT NOTE (OTHER) - ASSESSMENT
Pt feeling heaviness in head. Pt. denies having dizziness or headache. Pt states that he feels better than yesterday VS /75, HR 77, RR 17, O2 98.

## 2018-11-28 NOTE — CONSULT NOTE ADULT - SUBJECTIVE AND OBJECTIVE BOX
CHIEF COMPLAINT:  Afib     HISTORY OF PRESENT ILLNESS:  86 y/o male (not on aspirin) with past medical history of HTN, HLD, CAD s/p CABG, alpha-thalassemia presents to the ED as Code Stroke for dizziness and unsteady gait. Patient states he was completely normal before going to bed around 9pm on . Patient woke up around 12AM () to use the bathroom and reported no issues and was, again, completely normal. Patient then awoke for a 2nd time at 4am to use the bathroom at which point he felt room spinning dizziness as well unsteadiness while walking but attributed it to being the middle of the night and went back to bed. Patient then awoke at 8am and reported the dizziness persisted and patient continued to have difficulty ambulating and felt off balance. Patient was then brought to ED where he reported the dizziness improved but continued to have difficulty ambulating. Patient reports his symptoms never completely resolved and he never felt normal since waking up at 4am with dizziness and unsteady gait. LKN: 12am ().   Patient denies any headache, nausea, vomiting, double vision, blurry vision, numbness, tingling, unilateral weakness.    At baseline, patient ambulates with assistance and lives by himself and is fully independent in ADL's. Of note, patient's family reporting 2 episodes of dysphagia to solids over the last 6 months. At this time, patient reports no difficulty swallowing liquids or solids.   Found to be in Afib and on tele had a 2.5 second pause overnight. Denies chest pain, sob, or palpitations.       PAST MEDICAL & SURGICAL HISTORY:  Alpha thalassemia  HTN (hypertension)  Dyslipidemia  Myocardial infarction:   CAD (coronary artery disease)  S/[facility] in [facility]          MEDICATIONS:  aspirin enteric coated 81 milliGRAM(s) Oral daily  heparin  Injectable 5000 Unit(s) SubCutaneous every 12 hours  tamsulosin 0.4 milliGRAM(s) Oral at bedtime        acetaminophen   Tablet .. 975 milliGRAM(s) Oral two times a day PRN      atorvastatin 80 milliGRAM(s) Oral at bedtime    folic acid 1 milliGRAM(s) Oral daily      FAMILY HISTORY:  No pertinent family history in first degree relatives      SOCIAL HISTORY:    [ ] Non-smoker  [ ] Smoker  [ ] Alcohol    Allergies    No Known Allergies    Intolerances    	    REVIEW OF SYSTEMS:  CONSTITUTIONAL: No fever, weight loss,+ fatigue  EYES: No eye pain, visual disturbances, or discharge  ENMT:  No difficulty hearing, tinnitus, vertigo; No sinus or throat pain  NECK: No pain or stiffness  RESPIRATORY: No cough, wheezing, chills or hemoptysis; No Shortness of Breath  CARDIOVASCULAR: No chest pain, palpitations, passing out, dizziness, or leg swelling  GASTROINTESTINAL: No abdominal or epigastric pain. No nausea, vomiting, or hematemesis; No diarrhea or constipation. No melena or hematochezia.  GENITOURINARY: No dysuria, frequency, hematuria, or incontinence  NEUROLOGICAL: + headaches, memory loss, loss of strength, numbness, or tremors  SKIN: No itching, burning, rashes, or lesions   LYMPH Nodes: No enlarged glands  ENDOCRINE: No heat or cold intolerance; No hair loss  MUSCULOSKELETAL: + joint pain or swelling; No muscle, back, or extremity pain  PSYCHIATRIC: No depression, anxiety, mood swings, or difficulty sleeping  HEME/LYMPH: No easy bruising, or bleeding gums  ALLERGY AND IMMUNOLOGIC: No hives or eczema	    [ ] All others negative	  [ ] Unable to obtain    PHYSICAL EXAM:  T(C): 36.6 (18 @ 08:12), Max: 36.7 (18 @ 12:20)  HR: 76 (18 @ 08:00) (57 - 98)  BP: 158/78 (18 @ 08:00) (116/46 - 169/66)  RR: 22 (18 @ 08:00) (15 - 22)  SpO2: 100% (18 @ 08:00) (97% - 100%)  Wt(kg): --  I&O's Summary    2018 07:01  -  2018 07:00  --------------------------------------------------------  IN: 120 mL / OUT: 760 mL / NET: -640 mL        Appearance: Normal	  HEENT:   Normal oral mucosa, PERRL, EOMI	  Lymphatic: No lymphadenopathy  Cardiovascular: Normal S1 S2, No JVD, No murmurs, No edema  Respiratory: Lungs clear to auscultation	  Psychiatry: A & O x 3, Mood & affect appropriate  Gastrointestinal:  Soft, Non-tender, + BS	  Skin: No rashes, No ecchymoses, No cyanosis	  Extremities: Normal range of motion, No clubbing, cyanosis or edema  Vascular: Peripheral pulses palpable 2+ bilaterally  	Neurological Exam:  	Mental Status: Orientated to self, date and place.  Attention intact.  No dysarthria. Speech fluent.  	Cranial Nerves:   PERRL, EOMI, VFF, no nystagmus.  CN V1-3 intact to light touch.  No facial asymmetry. Tongue, uvula and palate midline.  Sternocleidomastoid and Trapezius intact bilaterally.    	Motor:   	Tone: normal.                  	Strength:     	[] Upper extremity                        	                                             R        No drift  	                                             L         No drift  	[] Lower extremity                         	                                             R       No drift  	                                             L        No drift  	Pronator drift: none                 	Dysmetria: None to finger-nose-finger or heel-shin-heel  	+truncal ataxia.    	Tremor: No resting, postural or action tremor.  No myoclonus.    	Sensation: intact to light touch, temperature    	Gait: +Ataxic gait. Unsteady upon standing.       Labs and Results:  TELEMETRY:  Afib, 2.5 second pause  	    ECG:  	Afib, Bifasicular block, Inferior TWI  RADIOLOGY:  < from: CT Angio Neck w/ IV Cont (11.27.18 @ 10:37) >    EXAM:  CT ANGIO NECK (W)AW IC                          EXAM:  CT BRAIN STROKE PROTOCOL                          EXAM:  CT ANGIO BRAIN (W)AW IC                            PROCEDURE DATE:  2018            INTERPRETATION:   CT angiography of thecircle of [place] and neck.   Noncontrast brain CT    CLINICAL INDICATION: Ataxia and vertigo, last known normal midline    TECHNIQUE: Direct axial CT scanning of the [place] of [place] and neck was   obtained from the vertex to the level of the clavicular heads after the   dynamic intravenous injection of 70 cc of Omnipaque 300. Sagittal and   coronal maximum intensity projection reformats were provided.    Three-dimensional reconstructions were performed by the radiologist using   the [employer] workstation.    COMPARISON: A brain CT dated 3/18/2017    FINDINGS:     Brain CT:    No acute hemorrhage or territorial infarct is identified. No   hydrocephalus, midline shift or extra-axial collections are present.   Age-appropriate involutional changes and mild microvascular ischemic   change are similar in appearance to the prior study. 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    There is minimal left maxillary sinus mucosal thickening with small polyp   versus retention cysts. Other tympanomastoid cavities are free of acute   disease.    Neck CTA:    The aortic arch is remarkable for a small amount of calcified plaque. The   great vessels are patent. Both vertebral arteries are identified and are   patent including their origins.    The common carotid arteries are patent.    Evaluation of the right-sided circulation demonstrates focal narrowing   the proximal right internal carotid artery possibly 33% luminal stenosis.   There is both calcified and noncalcified plaquein the area of stenosis   measures approximately 9 mm in length. There may be a very subtle   broad-based ulceration within the noncalcified plaque.    Evaluation of the left-sided circulation demonstrates both calcified and   noncalcified plaque with a mild stenosis a measuring approximately 7 mm   in length. Again, a small ulceration is identified within the   noncalcified plaque. Estimated stenosis is approximately 24% narrowing of   the luminal diameter.    Evaluation of the intracranial circulation demonstrates no major vessel   occlusion. No vascular aneurysm is identified. No abnormal vessels are   present.     There is a small basilar artery with a prominent posterior communicating   arteries filling the posterior cerebral arteries bilaterally. There is   calcified plaque involving the cavernous segments bilaterally with a mild   multifocal narrowing.    Impression:    Brain CT: No acute hemorrhage or major distribution infarct.    Neck CTA: Mild narrowing of the internal carotid arteries bilaterally   with calcified and noncalcified plaque and mild ulceration within the   noncalcified plaque bilaterally.    Brain CTA: No major vessel occlusion or stenosis    The results of this examination were discussed with stroke neurologyat   10:45 AM on 10/27/2018                    BONITA COLBERT M.D., ATTENDING RADIOLOGIST  This document has been electronically signed. 2018  1:30PM                    OTHER: 	  	  LABS:	 	    CARDIAC MARKERS:                                  10.4   5.2   )-----------( 174      ( 2018 10:11 )             31.2         136  |  105  |  18  ----------------------------<  109<H>  4.5   |  21<L>  |  1.17    Ca    8.7      2018 05:48  Phos  2.7       Mg     2.0         TPro  7.6  /  Alb  3.8  /  TBili  0.8  /  DBili  x   /  AST  15  /  ALT  8<L>  /  AlkPhos  72      proBNP:   Lipid Profile:   HgA1c:   TSH:

## 2018-11-29 VITALS — DIASTOLIC BLOOD PRESSURE: 63 MMHG | HEART RATE: 83 BPM | SYSTOLIC BLOOD PRESSURE: 164 MMHG

## 2018-11-29 PROCEDURE — 85730 THROMBOPLASTIN TIME PARTIAL: CPT

## 2018-11-29 PROCEDURE — 93306 TTE W/DOPPLER COMPLETE: CPT | Mod: 26

## 2018-11-29 PROCEDURE — 85027 COMPLETE CBC AUTOMATED: CPT

## 2018-11-29 PROCEDURE — 70496 CT ANGIOGRAPHY HEAD: CPT

## 2018-11-29 PROCEDURE — 93306 TTE W/DOPPLER COMPLETE: CPT

## 2018-11-29 PROCEDURE — 83735 ASSAY OF MAGNESIUM: CPT

## 2018-11-29 PROCEDURE — 70498 CT ANGIOGRAPHY NECK: CPT

## 2018-11-29 PROCEDURE — 99291 CRITICAL CARE FIRST HOUR: CPT | Mod: 25

## 2018-11-29 PROCEDURE — 80061 LIPID PANEL: CPT

## 2018-11-29 PROCEDURE — 97166 OT EVAL MOD COMPLEX 45 MIN: CPT

## 2018-11-29 PROCEDURE — 80048 BASIC METABOLIC PNL TOTAL CA: CPT

## 2018-11-29 PROCEDURE — 84100 ASSAY OF PHOSPHORUS: CPT

## 2018-11-29 PROCEDURE — 80053 COMPREHEN METABOLIC PANEL: CPT

## 2018-11-29 PROCEDURE — 93005 ELECTROCARDIOGRAM TRACING: CPT

## 2018-11-29 PROCEDURE — 83036 HEMOGLOBIN GLYCOSYLATED A1C: CPT

## 2018-11-29 PROCEDURE — 99233 SBSQ HOSP IP/OBS HIGH 50: CPT

## 2018-11-29 PROCEDURE — 70551 MRI BRAIN STEM W/O DYE: CPT

## 2018-11-29 PROCEDURE — 97161 PT EVAL LOW COMPLEX 20 MIN: CPT

## 2018-11-29 PROCEDURE — 84484 ASSAY OF TROPONIN QUANT: CPT

## 2018-11-29 PROCEDURE — 85610 PROTHROMBIN TIME: CPT

## 2018-11-29 PROCEDURE — 70450 CT HEAD/BRAIN W/O DYE: CPT

## 2018-11-29 RX ORDER — APIXABAN 2.5 MG/1
5 TABLET, FILM COATED ORAL EVERY 12 HOURS
Qty: 0 | Refills: 0 | Status: DISCONTINUED | OUTPATIENT
Start: 2018-11-29 | End: 2018-11-29

## 2018-11-29 RX ORDER — APIXABAN 2.5 MG/1
1 TABLET, FILM COATED ORAL
Qty: 180 | Refills: 0 | OUTPATIENT
Start: 2018-11-29 | End: 2019-02-26

## 2018-11-29 RX ORDER — SIMVASTATIN 20 MG/1
20 TABLET, FILM COATED ORAL AT BEDTIME
Qty: 0 | Refills: 0 | Status: DISCONTINUED | OUTPATIENT
Start: 2018-11-29 | End: 2018-11-29

## 2018-11-29 RX ORDER — TAMSULOSIN HYDROCHLORIDE 0.4 MG/1
1 CAPSULE ORAL
Qty: 0 | Refills: 0 | COMMUNITY

## 2018-11-29 RX ORDER — METOPROLOL TARTRATE 50 MG
1 TABLET ORAL
Qty: 30 | Refills: 0
Start: 2018-11-29 | End: 2018-12-28

## 2018-11-29 RX ORDER — METOPROLOL TARTRATE 50 MG
2 TABLET ORAL
Qty: 0 | Refills: 0 | DISCHARGE
Start: 2018-11-29 | End: 2018-12-28

## 2018-11-29 RX ORDER — METOPROLOL TARTRATE 50 MG
1 TABLET ORAL
Qty: 0 | Refills: 0 | COMMUNITY

## 2018-11-29 RX ORDER — ASPIRIN/CALCIUM CARB/MAGNESIUM 324 MG
1 TABLET ORAL
Qty: 90 | Refills: 0
Start: 2018-11-29 | End: 2019-02-26

## 2018-11-29 RX ORDER — ASPIRIN/CALCIUM CARB/MAGNESIUM 324 MG
1 TABLET ORAL
Qty: 90 | Refills: 0 | OUTPATIENT
Start: 2018-11-29 | End: 2019-02-26

## 2018-11-29 RX ORDER — ASPIRIN/CALCIUM CARB/MAGNESIUM 324 MG
1 TABLET ORAL
Qty: 0 | Refills: 0 | COMMUNITY

## 2018-11-29 RX ORDER — MULTIVIT-MIN/FERROUS GLUCONATE 9 MG/15 ML
1 LIQUID (ML) ORAL
Qty: 0 | Refills: 0 | COMMUNITY

## 2018-11-29 RX ADMIN — Medication 1 MILLIGRAM(S): at 13:40

## 2018-11-29 RX ADMIN — HEPARIN SODIUM 5000 UNIT(S): 5000 INJECTION INTRAVENOUS; SUBCUTANEOUS at 05:47

## 2018-11-29 RX ADMIN — APIXABAN 5 MILLIGRAM(S): 2.5 TABLET, FILM COATED ORAL at 17:31

## 2018-11-29 RX ADMIN — LISINOPRIL 10 MILLIGRAM(S): 2.5 TABLET ORAL at 05:47

## 2018-11-29 NOTE — PROGRESS NOTE ADULT - ASSESSMENT
85 year old man with multiple vascular risk factors including age, HTN, HPLD, CAD s/p CABG and alpha thalassemia was evaluated at [facility] for "dizziness". He reported he noticed multiple episodes of lightheadedness since then, all provoked by standing up from lying down position. Of note, he reports to have started on tamsulosin recently.  CT per admission and subsequent MRI brain did not show any evidence of acute infarct or hemorrhage. 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 Orthostatic vitals examination showed significant decrease in SBP and DBP with change in the posture and appropriate. Heart rate response. NIHSS 1. Pre-MRS 0 on admission.    Impression:  Dizziness-lightheadedness - likely etiology being orthostatic hypotension  Newly diagnosed atrial fibrillation    NEURO: Neurologically? Continue monitoring for neurologic deterioration, Allow for gradual normotension- started on home BP meds, LDL 59 - continue on home statin, MRI Brain w/o contrast noted above. Physical therapy/OT recommended home.     ANTITHROMBOTIC THERAPY: Therapeutic anticoagulation ([id number] score>1 and probable non-valvular atrial fibrillation) with DOACs like apixaban for primary ischemic stroke prevention, indefinitely. If not contraindicated due to any specific reasons in the future. Risks versus benefits and adverse reactions/complications associated with therapeutic anticoagulation with apixaban, including paradoxically increased risk of stroke or MI upon abrupt discontinuation of the medication were discussed with him in detail. Agree to continue with aspirin in addition to therapeutic anticoagulation due to his history of CAD and CABG     PULMONARY: protecting airway, saturating well     CARDIOVASCULAR: check TTE to look for valvular abnormalities, cardiac monitoring shows Afib. Would hold tamsulosin for now and have advised him to followup with urologist as outpatient to choose other appropriate medications without orthostatic hypotension properties.                            SBP goal: Gradual normotension    GASTROINTESTINAL:  dysphagia screen passed      Diet: Regular    RENAL: BUN/Cr previously within normal limits, good urine output      Na Goal: Greater than 135     Hall: no    HEMATOLOGY: no signs or symptoms of bleeding noted     DVT ppx: Heparin s.c    ID: afebrile, no leukocytosis previously     OTHER: Plan endorsed to patient and family members at bedside, all questions and concerns addressed.    DISPOSITION: Home    CORE MEASURES:        Admission NIHSS: 1     TPA: [] YES [x] NO      LDL/HDL: 69/36     Depression Screen: p     Statin Therapy: y     Dysphagia Screen: [x] PASS [] FAIL     Smoking [] YES [x] NO      Afib [x] YES [] NO     Stroke Education [x] YES [] NO 85 year old man with multiple vascular risk factors including age, HTN, HPLD, CAD s/p CABG and alpha thalassemia was evaluated at [facility] for "dizziness". He reported he noticed multiple episodes of lightheadedness since then, all provoked by standing up from lying down position. Of note, he reports to have started on tamsulosin recently.  CT per admission and subsequent MRI brain did not show any evidence of acute infarct or hemorrhage. 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 Orthostatic vitals examination showed significant decrease in SBP and DBP with change in the posture and appropriate. Heart rate response. NIHSS 1. Pre-MRS 0 on admission.    Impression:  Dizziness-lightheadedness - likely etiology being orthostatic hypotension  Newly diagnosed atrial fibrillation    NEURO: Neurologically without acute change- back to baseline, Continue monitoring for neurologic deterioration, Allow for gradual normotension- started on home BP meds, LDL 59 - continue on home statin, MRI Brain w/o contrast noted above. Physical therapy/OT recommended home.     ANTITHROMBOTIC THERAPY: Therapeutic anticoagulation ([id number] score>1 and probable non-valvular atrial fibrillation) with DOACs like apixaban for primary ischemic stroke prevention, indefinitely. Risks versus benefits and adverse reactions/complications associated with therapeutic anticoagulation with apixaban, including paradoxically increased risk of stroke or MI upon abrupt discontinuation of the medication were discussed with him in detail. Agree to continue with aspirin in addition to therapeutic anticoagulation due to his history of CAD and CABG     PULMONARY: protecting airway, saturating well     CARDIOVASCULAR: TTE EF 67% Mild-moderate mitral regurgitation, mild aortic stenosis, moderate-severe tricuspid  regurgitation, cardiac monitoring shows Afib. Would hold tamsulosin for now and have advised him to followup with urologist as outpatient to choose other appropriate medications without orthostatic hypotension properties.                            SBP goal: Gradual normotension    GASTROINTESTINAL:  dysphagia screen passed      Diet: Regular    RENAL: BUN/Cr previously within normal limits, good urine output      Na Goal: Greater than 135     Hall: no    HEMATOLOGY: no signs or symptoms of bleeding noted     DVT ppx: Heparin s.c    ID: afebrile, no leukocytosis previously     OTHER: Plan endorsed to patient and family members over the phone, all questions and concerns addressed.    DISPOSITION: Home    CORE MEASURES:        Admission NIHSS: 1     TPA: [] YES [x] NO      LDL/HDL: 69/36     Depression Screen: p     Statin Therapy: y     Dysphagia Screen: [x] PASS [] FAIL     Smoking [] YES [x] NO      Afib [x] YES [] NO     Stroke Education [x] YES [] NO 85 year old man with multiple vascular risk factors including age, HTN, HPLD, CAD s/p CABG and alpha thalassemia was evaluated at [facility] for "dizziness". He reported he noticed multiple episodes of lightheadedness since then, all provoked by standing up from lying down position. Of note, he reports to have started on tamsulosin recently.  CT per admission and subsequent MRI brain did not show any evidence of acute infarct or hemorrhage. 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 Orthostatic vitals examination showed significant decrease in SBP and DBP with change in the posture and appropriate heart rate response. MRI brain did not show any acute infarct. TTE did not show any obvious structural cardiac source of embolism nor showed significant valvular heart disease.     Impression:  Dizziness-lightheadedness - likely etiology being orthostatic hypotension  Newly diagnosed atrial fibrillation with [id number] score>1     NEURO: Neurologically without acute change - back to baseline, Continue monitoring for neurologic deterioration, Allow for gradual normotension - started on home BP meds, LDL 59 - continue on home statin, MRI Brain w/o contrast noted above. Physical therapy/OT recommended home.     ANTITHROMBOTIC THERAPY: Therapeutic anticoagulation ([id number] score>1 and probable non-valvular atrial fibrillation) with DOACs like apixaban for primary ischemic stroke prevention, indefinitely. Risks versus benefits and adverse reactions/complications associated with therapeutic anticoagulation with apixaban, including paradoxically increased risk of stroke or MI upon abrupt discontinuation of the medication were discussed with him in detail. Agree to continue with aspirin in addition to therapeutic anticoagulation due to his history of CAD and CABG     PULMONARY: protecting airway, saturating well     CARDIOVASCULAR: TTE EF 67% Mild-moderate mitral regurgitation, mild aortic stenosis, moderate-severe tricuspid regurgitation, cardiac monitoring shows Afib. Would hold tamsulosin for now and have advised him to followup with urologist as outpatient to choose other appropriate medications without orthostatic hypotension properties.                            SBP goal: Gradual normotension    GASTROINTESTINAL:  dysphagia screen passed      Diet: Regular    RENAL: BUN/Cr previously within normal limits, good urine output      Na Goal: Greater than 135     Hall: no    HEMATOLOGY: no signs or symptoms of bleeding noted     DVT ppx: Heparin s.c    ID: afebrile, no leukocytosis previously     OTHER: Plan endorsed to patient and family members over the phone, all questions and concerns addressed.    DISPOSITION: Home    CORE MEASURES:        Admission NIHSS: 1     TPA: [] YES [x] NO      LDL/HDL: 69/36     Depression Screen: p     Statin Therapy: y     Dysphagia Screen: [x] PASS [] FAIL     Smoking [] YES [x] NO      Afib [x] YES [] NO     Stroke Education [x] YES [] NO

## 2018-11-29 NOTE — OCCUPATIONAL THERAPY INITIAL EVALUATION ADULT - PERTINENT HX OF CURRENT PROBLEM, REHAB EVAL
84 yo M p/w 6 hour hx of feeling like the room is spinning and unsteady gait. Pt reports he woke up to urinate at 4am today, had unsteady gait that was still present when he woke up again at 8am, pt states the unsteady gait is persisting now however his dizziness has resolved. Pt found to be in A-fib, suspected to have had CVA. CT Brain 11/28: No acute hemorrhage or major distribution infarct.

## 2018-11-29 NOTE — PROGRESS NOTE ADULT - SUBJECTIVE AND OBJECTIVE BOX
THE PATIENT WAS SEEN AND EXAMINED BY ME WITH THE HOUSESTAFF AND STROKE TEAM DURING MORNING ROUNDS.   HPI:  85 year old man with multiple vascular risk factors including age, HTN, HPLD, CAD s/p CABG and alpha thalassemia was evaluated at [facility] for "dizziness". On 11/27, he reported to have noted dizziness best described as lightheadedness upon waking up and getting out of the bed at night. He reported he noticed multiple episodes of lightheadedness since then, all provoked by standing up from lying down position. Of note, he reports to have started on tamsulosin recently.  CT per admission and subsequent MRI brain did not show any evidence of acute infarct or hemorrhage. 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 Orthostatic vitals examination showed significant decrease in SBP and DBP with change in the posture and appropriate. Heart rate response. NIHSS 1. Pre-MRS 0 on admission.    ROS: All negative except documented in the HPI.     SUBJECTIVE: No events overnight.  No new neurologic complaints.      acetaminophen   Tablet .. 975 milliGRAM(s) Oral two times a day PRN  aspirin enteric coated 81 milliGRAM(s) Oral daily  atorvastatin 80 milliGRAM(s) Oral at bedtime  folic acid 1 milliGRAM(s) Oral daily  heparin  Injectable 5000 Unit(s) SubCutaneous every 12 hours  lisinopril 10 milliGRAM(s) Oral daily  tamsulosin 0.4 milliGRAM(s) Oral at bedtime    PHYSICAL EXAM:   Vital Signs Last 24 Hrs  T(C): 37 (29 Nov 2018 05:45), Max: 37 (29 Nov 2018 05:45)  T(F): 98.6 (29 Nov 2018 05:45), Max: 98.6 (29 Nov 2018 05:45)  HR: 81 (29 Nov 2018 05:45) (70 - 97)  BP: 145/64 (29 Nov 2018 05:45) (131/64 - 192/80)  BP(mean): 94 (28 Nov 2018 16:00) (76 - 112)  RR: 18 (29 Nov 2018 05:45) (16 - 22)  SpO2: 97% (29 Nov 2018 05:45) (97% - 100%)    General: No acute distress  HEENT: EOM intact, visual fields full  Abdomen: Soft, nontender, nondistended   Extremities: No edema    NEUROLOGICAL EXAM:  Mental status: Awake, alert, oriented x3, no neglect, normal memory, follows commands.   Cranial Nerves: No facial asymmetry, no nystagmus, no dysarthria,  tongue midline  Motor exam: Normal tone, no drift, 5/5 RUE, 5/5 RLE, 5/5 LUE, 5/5 LLE  Sensation: Intact to light touch   Coordination/ Gait: No dysmetria, gait deferred    LABS:                        9.7    4.27  )-----------( 163      ( 28 Nov 2018 08:07 )             29.1    11-28    136  |  105  |  18  ----------------------------<  109<H>  4.5   |  21<L>  |  1.17    Ca    8.7      28 Nov 2018 05:48  Phos  2.7     11-28  Mg     2.0     11-28    TPro  7.6  /  Alb  3.8  /  TBili  0.8  /  DBili  x   /  AST  15  /  ALT  8<L>  /  AlkPhos  72  11-27  PT/INR - ( 27 Nov 2018 10:11 )   PT: 12.7 sec;   INR: 1.11 ratio      PTT - ( 27 Nov 2018 10:11 )  PTT:28.7 sec  Hemoglobin A1C, Whole Blood: 5.7 % (11-28 @ 08:07)  Hemoglobin A1C, Whole Blood: 5.7 % (11-28 @ 08:07)    IMAGING: Reviewed by me.     MRI Head No Cont (11.28.18)  Atrophy and small vessel white matter ischemic changes. 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 No acute infarcts, hemorrhage or mass.    Brain CT (11.27.18): No acute hemorrhage or major distribution infarct.    Neck CTA (11.27.18): Mild narrowing of the internal carotid arteries bilaterally   with calcified and noncalcified plaque and mild ulceration within the   noncalcified plaque bilaterally.    Brain CTA (11.27.18): No major vessel occlusion or stenosis. THE PATIENT WAS SEEN AND EXAMINED BY ME WITH THE HOUSESTAFF AND STROKE TEAM DURING MORNING ROUNDS.   HPI:  85 year old man with multiple vascular risk factors including age, HTN, HPLD, CAD s/p CABG and alpha thalassemia was evaluated at [facility] for "dizziness". On 11/27, he reported to have noted dizziness best described as lightheadedness upon waking up and getting out of the bed at night. He reported he noticed multiple episodes of lightheadedness since then, all provoked by standing up from lying down position. Of note, he reports to have started on tamsulosin recently.  CT per admission and subsequent MRI brain did not show any evidence of acute infarct or hemorrhage. 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 Orthostatic vitals examination showed significant decrease in SBP and DBP with change in the posture and appropriate. Heart rate response. NIHSS 1. Pre-MRS 0 on admission.    ROS: All negative except documented in the HPI.     SUBJECTIVE: No events overnight.  No new neurologic complaints.      acetaminophen   Tablet .. 975 milliGRAM(s) Oral two times a day PRN  aspirin enteric coated 81 milliGRAM(s) Oral daily  atorvastatin 80 milliGRAM(s) Oral at bedtime  folic acid 1 milliGRAM(s) Oral daily  heparin  Injectable 5000 Unit(s) SubCutaneous every 12 hours  lisinopril 10 milliGRAM(s) Oral daily  tamsulosin 0.4 milliGRAM(s) Oral at bedtime    PHYSICAL EXAM:   Vital Signs Last 24 Hrs  T(C): 37 (29 Nov 2018 05:45), Max: 37 (29 Nov 2018 05:45)  T(F): 98.6 (29 Nov 2018 05:45), Max: 98.6 (29 Nov 2018 05:45)  HR: 81 (29 Nov 2018 05:45) (70 - 97)  BP: 145/64 (29 Nov 2018 05:45) (131/64 - 192/80)  BP(mean): 94 (28 Nov 2018 16:00) (76 - 112)  RR: 18 (29 Nov 2018 05:45) (16 - 22)  SpO2: 97% (29 Nov 2018 05:45) (97% - 100%)    General: No acute distress  HEENT: EOM intact, visual fields full  Abdomen: Soft, nontender, nondistended   Extremities: No edema    NEUROLOGICAL EXAM:  Mental status: Awake, alert, oriented x3, no neglect, normal memory, follows commands.   Cranial Nerves: No facial asymmetry, no nystagmus, no dysarthria,  tongue midline  Motor exam: Normal tone, no drift, 5/5 RUE, 5/5 RLE, 5/5 LUE, 5/5 LLE  Sensation: Intact to light touch   Coordination/ Gait: No dysmetria, gait normal, heel to toe and knee to shin.    LABS:                        9.7    4.27  )-----------( 163      ( 28 Nov 2018 08:07 )             29.1    11-28    136  |  105  |  18  ----------------------------<  109<H>  4.5   |  21<L>  |  1.17    Ca    8.7      28 Nov 2018 05:48  Phos  2.7     11-28  Mg     2.0     11-28    TPro  7.6  /  Alb  3.8  /  TBili  0.8  /  DBili  x   /  AST  15  /  ALT  8<L>  /  AlkPhos  72  11-27  PT/INR - ( 27 Nov 2018 10:11 )   PT: 12.7 sec;   INR: 1.11 ratio      PTT - ( 27 Nov 2018 10:11 )  PTT:28.7 sec  Hemoglobin A1C, Whole Blood: 5.7 % (11-28 @ 08:07)  Hemoglobin A1C, Whole Blood: 5.7 % (11-28 @ 08:07)    IMAGING: Reviewed by me.     MRI Head No Cont (11.28.18)  Atrophy and small vessel white matter ischemic changes. 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 No acute infarcts, hemorrhage or mass.    Brain CT (11.27.18): No acute hemorrhage or major distribution infarct.    Neck CTA (11.27.18): Mild narrowing of the internal carotid arteries bilaterally   with calcified and noncalcified plaque and mild ulceration within the   noncalcified plaque bilaterally.    Brain CTA (11.27.18): No major vessel occlusion or stenosis. THE PATIENT WAS SEEN AND EXAMINED BY ME WITH THE HOUSESTAFF AND STROKE TEAM DURING MORNING ROUNDS.     HPI:  85 year old man with multiple vascular risk factors including age, HTN, HPLD, CAD s/p CABG and alpha thalassemia was evaluated at [facility] for "dizziness". On 11/27, he reported to have noted dizziness best described as lightheadedness upon waking up and getting out of the bed at night. He reported he noticed multiple episodes of lightheadedness since then, all provoked by standing up from lying down position. Of note, he reports to have started on tamsulosin recently.  CT per admission and subsequent MRI brain did not show any evidence of acute infarct or hemorrhage. 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 Orthostatic vitals examination showed significant decrease in SBP and DBP with change in the posture and appropriate. Heart rate response. NIHSS 1. Pre-MRS 0 on admission.    ROS: All negative except documented in the HPI.     SUBJECTIVE: No events overnight.  No new neurologic complaints.      acetaminophen   Tablet .. 975 milliGRAM(s) Oral two times a day PRN  aspirin enteric coated 81 milliGRAM(s) Oral daily  atorvastatin 80 milliGRAM(s) Oral at bedtime  folic acid 1 milliGRAM(s) Oral daily  heparin  Injectable 5000 Unit(s) SubCutaneous every 12 hours  lisinopril 10 milliGRAM(s) Oral daily  tamsulosin 0.4 milliGRAM(s) Oral at bedtime    PHYSICAL EXAM:   Vital Signs Last 24 Hrs  T(C): 37 (29 Nov 2018 05:45), Max: 37 (29 Nov 2018 05:45)  T(F): 98.6 (29 Nov 2018 05:45), Max: 98.6 (29 Nov 2018 05:45)  HR: 81 (29 Nov 2018 05:45) (70 - 97)  BP: 145/64 (29 Nov 2018 05:45) (131/64 - 192/80)  BP(mean): 94 (28 Nov 2018 16:00) (76 - 112)  RR: 18 (29 Nov 2018 05:45) (16 - 22)  SpO2: 97% (29 Nov 2018 05:45) (97% - 100%)    General: No acute distress  HEENT: EOM intact, visual fields full  Abdomen: Soft, nontender, nondistended   Extremities: No edema    NEUROLOGICAL EXAM:  Mental status: Awake, alert, oriented x3, no neglect, normal memory, follows commands.   Cranial Nerves: No facial asymmetry, no nystagmus, no dysarthria,  tongue midline  Motor exam: Normal tone, no drift, 5/5 RUE, 5/5 RLE, 5/5 LUE, 5/5 LLE  Sensation: Intact to light touch   Coordination/ Gait: No dysmetria, gait normal, heel to toe and knee to shin.    LABS:                        9.7    4.27  )-----------( 163      ( 28 Nov 2018 08:07 )             29.1    11-28    136  |  105  |  18  ----------------------------<  109<H>  4.5   |  21<L>  |  1.17    Ca    8.7      28 Nov 2018 05:48  Phos  2.7     11-28  Mg     2.0     11-28    TPro  7.6  /  Alb  3.8  /  TBili  0.8  /  DBili  x   /  AST  15  /  ALT  8<L>  /  AlkPhos  72  11-27  PT/INR - ( 27 Nov 2018 10:11 )   PT: 12.7 sec;   INR: 1.11 ratio      PTT - ( 27 Nov 2018 10:11 )  PTT:28.7 sec  Hemoglobin A1C, Whole Blood: 5.7 % (11-28 @ 08:07)  Hemoglobin A1C, Whole Blood: 5.7 % (11-28 @ 08:07)    IMAGING: Reviewed by me.     MRI Head No Cont (11.28.18)  Atrophy and small vessel white matter ischemic changes. 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 No acute infarcts, hemorrhage or mass.    Brain CT (11.27.18): No acute hemorrhage or major distribution infarct.    Neck CTA (11.27.18): Mild narrowing of the internal carotid arteries bilaterally   with calcified and noncalcified plaque and mild ulceration within the   noncalcified plaque bilaterally.    Brain CTA (11.27.18): No major vessel occlusion or stenosis.

## 2018-11-29 NOTE — OCCUPATIONAL THERAPY INITIAL EVALUATION ADULT - ANTICIPATED DISCHARGE DISPOSITION, OT EVAL
Home OT to address deficits, assess home safety, increase independence in ADLs and functional mobility. Supervision/assistance with functional activity/home w/ OT

## 2018-11-29 NOTE — PROGRESS NOTE ADULT - SUBJECTIVE AND OBJECTIVE BOX
Subjective: Patient seen and examined. No new events except as noted.   Moved out of Stroke unit   Resting comfortably in bed   no cp or sob     REVIEW OF SYSTEMS:    CONSTITUTIONAL:+weakness, fevers or chills  EYES/ENT: No visual changes;  No vertigo or throat pain   NECK: No pain or stiffness  RESPIRATORY: No cough, wheezing, hemoptysis; No shortness of breath  CARDIOVASCULAR: No chest pain or palpitations  GASTROINTESTINAL: No abdominal or epigastric pain. No nausea, vomiting, or hematemesis; No diarrhea or constipation. No melena or hematochezia.  GENITOURINARY: No dysuria, frequency or hematuria  NEUROLOGICAL: + numbness or weakness  SKIN: No itching, burning, rashes, or lesions   All other review of systems is negative unless indicated above.    MEDICATIONS:  MEDICATIONS  (STANDING):  apixaban 5 milliGRAM(s) Oral every 12 hours  folic acid 1 milliGRAM(s) Oral daily  lisinopril 10 milliGRAM(s) Oral daily  simvastatin 20 milliGRAM(s) Oral at bedtime      PHYSICAL EXAM:  T(C): 36.7 (11-29-18 @ 08:12), Max: 37 (11-29-18 @ 05:45)  HR: 85 (11-29-18 @ 10:06) (70 - 85)  BP: 177/77 (11-29-18 @ 10:06) (145/64 - 192/80)  RR: 20 (11-29-18 @ 08:12) (16 - 20)  SpO2: 97% (11-29-18 @ 10:06) (97% - 100%)  Wt(kg): --  I&O's Summary    28 Nov 2018 07:01  -  29 Nov 2018 07:00  --------------------------------------------------------  IN: 460 mL / OUT: 100 mL / NET: 360 mL    29 Nov 2018 07:01  -  29 Nov 2018 11:37  --------------------------------------------------------  IN: 320 mL / OUT: 0 mL / NET: 320 mL          Appearance: NAD	  HEENT:   Normal oral mucosa, PERRL, EOMI	  Lymphatic: No lymphadenopathy , no edema  Cardiovascular: Irregular S1 S2, No JVD, No murmurs , Peripheral pulses palpable 2+ bilaterally  Respiratory: Lungs clear to auscultation, normal effort 	  Gastrointestinal:  Soft, Non-tender, + BS	  Skin: No rashes, No ecchymoses, No cyanosis, warm to touch  Musculoskeletal: Normal range of motion, normal strength  Psychiatry:  Mood & affect appropriate  Ext: No edema  NEUROLOGICAL EXAM:  Mental status: Awake, alert, oriented x3, no neglect, normal memory, follows commands.   Cranial Nerves: No facial asymmetry, no nystagmus, no dysarthria,  tongue midline  Motor exam: Normal tone, no drift, 5/5 RUE, 5/5 RLE, 5/5 LUE, 5/5 LLE  Sensation: Intact to light touch   Coordination/ Gait: No dysmetria, gait deferred        LABS:    CARDIAC MARKERS:                                9.7    4.27  )-----------( 163      ( 28 Nov 2018 08:07 )             29.1     11-28    136  |  105  |  18  ----------------------------<  109<H>  4.5   |  21<L>  |  1.17    Ca    8.7      28 Nov 2018 05:48  Phos  2.7     11-28  Mg     2.0     11-28      proBNP:   Lipid Profile:   HgA1c:   TSH:             TELEMETRY: 	 AF   ECG:  	  RADIOLOGY:   < from: MR Head No Cont (11.28.18 @ 16:51) >    EXAM:  MR BRAIN                            PROCEDURE DATE:  11/28/2018            INTERPRETATION:    CLINICAL INDICATION: Unsteady on feet, truncal ataxia      Magnetic resonance imaging of the brain was carried out with transaxial   SPGR, FLAIR, fast spin echo T2 weighted images, axial susceptibility   weighted series, diffusion weighted series and sagittal T1 weighted   series on a 0.5 Liliana magnet.    Comparison is made with the prior CT/CTA of 11/27/2018.    The mild atrophy is identified with ventricular and sulcal prominence.   Small vessel white matter ischemic changes are noted. No acute infarcts   are seen. There is a small old left cerebellar infarct.    A few small foci of hemosiderin are identified on the susceptibility   weighted series, one in the left temporal occipital cortex and one in the   right posterior frontal parasagittal cortex. These may be due to small   previous microhemorrhages which can be seen in patients with   hypertension, amyloid angiopathy or multiple cavernomas.    There has been previous bilateral lens replacement surgery.    The sellar and parasellar structures are unremarkable.      IMPRESSION: Atrophy and small vessel white matter ischemic changes. 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 No acuteinfarcts, hemorrhage or mass..                      MELYSSA NERI M.D., ATTENDING RADIOLOGIST  This document has been electronically signed. Nov 28 2018  5:16PM                < end of copied text >    DIAGNOSTIC TESTING:  [ ] Echocardiogram:    [ ]  Catheterization:  [ ] Stress Test:    OTHER:

## 2018-11-29 NOTE — PROGRESS NOTE ADULT - PROBLEM SELECTOR PLAN 3
Monitor on tele for now   Overall rate is well controlled   Not uncommon to have up to 3-5 second pauses in Afib. Remains asymptomatic   No indication for PPM at this time

## 2018-11-29 NOTE — OCCUPATIONAL THERAPY INITIAL EVALUATION ADULT - ADDITIONAL COMMENTS
MR Head 11/28 Atrophy and small vessel white matter ischemic changes. Small old left cerebellar infarct. No acute infarcts, hemorrhage or mass..

## 2018-11-29 NOTE — OCCUPATIONAL THERAPY INITIAL EVALUATION ADULT - LIVES WITH, PROFILE
Lives in house alone with 3 steps to enter. no railing, bed/bath on 1st floor, +tub shower. Pt had HHA prior for 3-4 days week/alone

## 2018-11-30 RX ORDER — APIXABAN 2.5 MG/1
1 TABLET, FILM COATED ORAL
Qty: 180 | Refills: 0
Start: 2018-11-30 | End: 2019-02-27

## 2018-12-14 NOTE — PROVIDER CONTACT NOTE (OTHER) - DATE AND TIME:
[facility]  [address]  Phone: [phone]  Fax: [phone]    Marianna Loya MD        December 14, 2018     Patient: Maximilian Patel   YOB: 2013   Date of Visit: 12/14/2018       To Whom it May Concern:    Maximilian Patel was seen in my clinic on 12/14/2018. He may return to school on Monday, 12/17/18. If you have any questions or concerns, please don't hesitate to call.     Sincerely,       Marianna Loya MD 28-Nov-2018 05:25

## 2019-02-22 NOTE — PATIENT PROFILE ADULT. - NS PRO CONTRA FLU 1
Patient started with grand mal seizure like activity  That lasted for approx  For 1 minute   Ativan given     Tulio Kennedy RN  02/22/19 [id number] no

## 2019-03-08 NOTE — H&P ADULT. - [facility] HE PE MLT D E PC
regular rate and rhythm/no murmur Consent 3/Introductory Paragraph: I gave the patient a chance to ask questions they had about the procedure.  Following this I explained the Mohs procedure and consent was obtained. The risks, benefits and alternatives to therapy were discussed in detail. Specifically, the risks of infection, scarring, bleeding, prolonged wound healing, incomplete removal, allergy to anesthesia, nerve injury and recurrence were addressed. Prior to the procedure, the treatment site was clearly identified and confirmed by the patient. All components of Universal Protocol/PAUSE Rule completed.

## 2019-04-05 NOTE — DISCHARGE NOTE ADULT - NS MD DC PLAN IMMU FLU PROVIDE INFO
Inpatient Rehabilitation Plan of Care Note    Plan of Care  Care Plan Reviewed - No updates at this time.    Safety    Performed Intervention(s)  Hourly rounding , items within reach  Assistance with out of bed activities  Falls protocol  bed/chair alarm      Psychosocial    Performed Intervention(s)   PRN  Patient to verbalize feelings and cocnerns  Psychologist PRN      Body Systems    Performed Intervention(s)  Accuchecks ACHS  Medication as ordered  consistent carb diet      Sphincter Control    Performed Intervention(s)  Assistance with urinal  Assistance to bathroom  Incontinence care PRN    Signed by: Fina Torres RN     Risks/benefits discussed with patient or patient surrogate

## 2019-05-28 LAB
ALBUMIN SERPL ELPH-MCNC: 3.7 G/DL
ALP BLD-CCNC: 80 U/L
ALT SERPL-CCNC: 22 U/L
ANCA AB SER-IMP: NEGATIVE
ANION GAP SERPL CALC-SCNC: 14 MMOL/L
AST SERPL-CCNC: 23 U/L
BASOPHILS # BLD AUTO: 0.16 K/UL
BASOPHILS NFR BLD AUTO: 2.7 %
BILIRUB SERPL-MCNC: 0.6 MG/DL
BUN SERPL-MCNC: 23 MG/DL
C-ANCA SER-ACNC: NEGATIVE
CALCIUM SERPL-MCNC: 9.9 MG/DL
CENTROMERE IGG SER-ACNC: <0.2 AL
CHLORIDE SERPL-SCNC: 99 MMOL/L
CO2 SERPL-SCNC: 19 MMOL/L
CREAT SERPL-MCNC: 1.39 MG/DL
ENA JO1 AB SER IA-ACNC: <0.2 AL
ENA SCL70 IGG SER IA-ACNC: <0.2 AL
ENA SS-A AB SER IA-ACNC: <0.2 AL
ENA SS-B AB SER IA-ACNC: <0.2 AL
EOSINOPHIL # BLD AUTO: 0.21 K/UL
EOSINOPHIL NFR BLD AUTO: 3.6 %
GLUCOSE SERPL-MCNC: 82 MG/DL
HCT VFR BLD CALC: 30.8 %
HGB BLD-MCNC: 10.2 G/DL
LYMPHOCYTES # BLD AUTO: 1.39 K/UL
LYMPHOCYTES NFR BLD AUTO: 24.1 %
MAN DIFF?: NORMAL
MCHC RBC-ENTMCNC: 21.5 PG
MCHC RBC-ENTMCNC: 33.1 GM/DL
MCV RBC AUTO: 64.8 FL
MONOCYTES # BLD AUTO: 0.77 K/UL
MONOCYTES NFR BLD AUTO: 13.4 %
NEUTROPHILS # BLD AUTO: 3.03 K/UL
NEUTROPHILS NFR BLD AUTO: 52.7 %
P-ANCA TITR SER IF: NEGATIVE
PLATELET # BLD AUTO: 272 K/UL
POTASSIUM SERPL-SCNC: 5.3 MMOL/L
PROT SERPL-MCNC: 9.2 G/DL
RBC # BLD: 4.75 M/UL
RBC # FLD: 15.4 %
RHEUMATOID FACT SER QL: <7 IU/ML
SODIUM SERPL-SCNC: 132 MMOL/L
WBC # FLD AUTO: 5.75 K/UL

## 2019-10-25 NOTE — ED ADULT NURSE NOTE - FALL HARM RISK TYPE OF ASSESSMENT
Hospitalist Team    Patient Care Team:  Smita Howard APRN as PCP - General (Nurse Practitioner)    Interval History and ROS: 74-year-old  female with a past medical history of anxiety, depression, hypertension, and obesity who presented to [facility] on 10/23/2019 with complaints of shortness of breath, weakness, and chest pressure.  Per the sister-in-law at bedside the patient was just relieved yesterday.  She was hospitalized then for weakness, shortness of breath, chest pain, and was diagnosed with the UTI.  When the patient was discharged yesterday and got home she still have the same complaints so family brought her back into the ED.  Patient was able to admit she is still having shortness of breath and chest pressure.  She describes her chest pressure as a 6 out of 10.  She states pain medication helps the chest pressure and tension makes it worse.She denies any recent nausea, vomiting, diarrhea, fever, chills.      In the ED, CT chest showed Acute pulmonary emboli involving right upper and right lower lobe  pulmonary arteries extending into segmental branches as above. No  findings of right heart strain or pulmonary infarct.Coronary atherosclerotic disease.Small hiatal hernia.  Chest x-ray is unremarkable.  EKG showed Sinus rhythm, Left anterior fascicular block, Probable anteroseptal infarct, old.  All labs unremarkable upon admission except d-dimer 1.42.  All vital signs stable upon admission.  Patient started on heparin drip in the ED.     Upon review of patient, patient just recently discharged 10/17/2019.  She was admitted from 10/15-10/16 for a chest pain rule out, which was negative.  She was found to have a UTI and was treated with Rocephin while inpatient.  She was discharged home on Amoxicillin, but reportedly did not take any of this yet    10/24 weakness, denies chest pain  10/25 c/o chest pain,       History taken from: patient    Review of Systems   Constitutional:  "Positive for fatigue.   HENT: Negative.    Respiratory: Negative.    Cardiovascular: Negative.    Gastrointestinal: Negative.    Neurological: Positive for weakness.       Objective    Vital Signs  Temp:  [97.7 °F (36.5 °C)-97.8 °F (36.6 °C)] 97.8 °F (36.6 °C)  Heart Rate:  [72-82] 76  Resp:  [16-18] 18  BP: (118-130)/(71-74) 123/72  Oxygen Therapy  SpO2: 97 %  Pulse Oximetry Type: Intermittent  Device (Oxygen Therapy): room air  Flowsheet Rows      First Filed Value   Admission Height  162.6 cm (64\") Documented at 10/23/2019 1032   Admission Weight  83.5 kg (184 lb) Documented at 10/23/2019 1032        Intake & Output (last 3 days)       10/22 0701 - 10/23 0700 10/23 0701 - 10/24 0700 10/24 0701 - 10/25 0700 10/25 0701 - 10/26 0700    P.O.   520     Total Intake(mL/kg)   520 (5.8)     Net   +520             Urine Unmeasured Occurrence  3 x 3 x         Lines, Drains & Airways    Active LDAs     Name:   Placement date:   Placement time:   Site:   Days:    Peripheral IV 10/23/19 1109   10/23/19    1109    --   less than 1                Physical Exam:    General Appearance:    Alert, cooperative, in no acute distress   Head:    Normocephalic, without obvious abnormality, atraumatic   Eyes:            Lids and lashes normal, conjunctivae and sclerae normal, no   icterus, no pallor, corneas clear, PERRLA   Neck:   No adenopathy, supple, trachea midline, no thyromegaly, no   carotid bruit, no JVD   Back:     No kyphosis present, no scoliosis present, no skin lesions,      erythema or scars, no tenderness to percussion or                   palpation,   range of motion normal   Lungs:     Clear to auscultation,respirations regular, even and                  unlabored    Heart:    Regular rhythm and normal rate, normal S1 and S2, no            murmur, no gallop, no rub, no click   Chest Wall:    No abnormalities observed   Abdomen:     Normal bowel sounds, no masses, no organomegaly, soft        non-tender, non-distended, " no guarding, no rebound                tenderness   Extremities:   Moves all extremities well, no edema, no cyanosis, no             redness   Skin:   No bleeding, bruising or rash   Neurologic:   Cranial nerves 2 - 12 grossly intact, sensation intact, DTR       present and equal bilaterally       Results Review:      Lab Results (last 24 hours)     Procedure Component Value Units Date/Time    CBC & Differential [[id number]] Collected:  10/25/19 0300    Specimen:  Blood Updated:  10/25/19 0324    Narrative:       The following orders were created for panel order CBC & Differential.  Procedure                               Abnormality         Status                     ---------                               -----------         ------                     CBC Auto Differential[[id number]]        Normal              Final result                 Please view results for these tests on the individual orders.    CBC Auto Differential [[id number]]  (Normal) Collected:  10/25/19 0300    Specimen:  Blood Updated:  10/25/19 0324     WBC 6.10 10*3/mm3      RBC 4.24 10*6/mm3      Hemoglobin 13.5 g/dL      Hematocrit 38.6 %      MCV 91.0 fL      MCH 31.9 pg      MCHC 35.0 g/dL      RDW 13.7 %      RDW-SD 43.3 fl      MPV 8.7 fL      Platelets 212 10*3/mm3      Neutrophil % 51.1 %      Lymphocyte % 38.0 %      Monocyte % 8.0 %      Eosinophil % 1.7 %      Basophil % 1.2 %      Neutrophils, Absolute 3.10 10*3/mm3      Lymphocytes, Absolute 2.30 10*3/mm3      Monocytes, Absolute 0.50 10*3/mm3      Eosinophils, Absolute 0.10 10*3/mm3      Basophils, Absolute 0.10 10*3/mm3      nRBC 0.1 /100 WBC           Imaging Results (last 24 hours)     ** No results found for the last 24 hours. **          ECG/EMG Results (most recent)     Procedure Component Value Units Date/Time    ECG 12 Lead [[id number]] Collected:  10/23/19 1042     Updated:  10/24/19 0841    Narrative:       HEART RATE= 92  bpm  RR Interval= 652  ms  OH Interval= 161  ms  P  Horizontal Axis= 9  deg  P Front Axis= 17  deg  QRSD Interval= 87  ms  QT Interval= 365  ms  QRS Axis= -57  deg  T Wave Axis= -58  deg  - ABNORMAL ECG -  Sinus rhythm  Probable anteroseptal infarct, old  When compared with ECG of 17-Oct-2019 9:37:59,  No significant change  Electronically Signed By: Giacomo Pierre (BRIAN) 24-Oct-2019 08:40:55  Date and Time of Study: 2019-10-23 10:42:04          I reviewed the patient's new clinical results.    Medication Review:     Current Facility-Administered Medications:   •  ALPRAZolam (XANAX) tablet 0.25 mg, 0.25 mg, Oral, Nightly PRN, Fabrice Kiser MD  •  aluminum-magnesium hydroxide-simethicone (MAALOX MAX) 400-400-40 MG/5ML suspension 15 mL, 15 mL, Oral, Q6H PRN, Goldie Herman APRN  •  amLODIPine (NORVASC) tablet 10 mg, 10 mg, Oral, Daily, Goldie Herman, APRN, 10 mg at 10/24/19 0952  •  amoxicillin (AMOXIL) capsule 500 mg, 500 mg, Oral, BID, Goldie Herman, APRN, 500 mg at 10/24/19 2029  •  apixaban (ELIQUIS) tablet 10 mg, 10 mg, Oral, Q12H **FOLLOWED BY** [START ON 11/1/2019] apixaban (ELIQUIS) tablet 5 mg, 5 mg, Oral, Q12H, Fabrice Kiser MD  •  bisacodyl (DULCOLAX) suppository 10 mg, 10 mg, Rectal, Daily PRN, Goldie Herman, APRN  •  donepezil (ARICEPT) tablet 10 mg, 10 mg, Oral, Nightly, Goldie Herman, APRN, 10 mg at 10/24/19 2029  •  FLUoxetine (PROzac) capsule 20 mg, 20 mg, Oral, Daily, Goldie Herman, APRN, 20 mg at 10/24/19 0952  •  furosemide (LASIX) tablet 20 mg, 20 mg, Oral, Daily PRN, Goldie Herman APRN  •  magnesium hydroxide (MILK OF MAGNESIA) suspension 2400 mg/10mL 10 mL, 10 mL, Oral, Daily PRN, Goldie Herman APRN  •  melatonin tablet 5 mg, 5 mg, Oral, Nightly PRN, Goldie Herman APRN, 5 mg at 10/23/19 8204  •  ondansetron (ZOFRAN) tablet 4 mg, 4 mg, Oral, Q6H PRN **OR** ondansetron (ZOFRAN) injection 4 mg, 4 mg, Intravenous, Q6H PRN, Goldie Herman APRN, 4 mg at 10/24/19 8056  •  pantoprazole  (PROTONIX) EC tablet 40 mg, 40 mg, Oral, Daily, Goldie Herman, APRN, 40 mg at 10/24/19 0952  •  risperiDONE (risperDAL) tablet 0.25 mg, 0.25 mg, Oral, Nightly, Fabrice Kiser MD, 0.25 mg at 10/24/19 2029  •  [COMPLETED] Insert peripheral IV, , , Once **AND** sodium chloride 0.9 % flush 10 mL, 10 mL, Intravenous, PRN, Aliza Bran PA-C  •  sodium chloride 0.9 % flush 10 mL, 10 mL, Intravenous, Q12H, Goldie Herman, APRN, 10 mL at 10/24/19 2030  •  sodium chloride 0.9 % flush 10 mL, 10 mL, Intravenous, PRN, Frankie Hermana SUKUMAR, APRN    I have reviewed the patient's current medication list    Assessment/Plan     Multiple subsegmental pulmonary emboli without acute cor pulmonale   Shortness of breath and chest pressure secondary to pulmonary emboli  - CT chest showed Acute pulmonary emboli involving right upper and right lower lobe  pulmonary arteries extending into segmental branches as above. No  findings of right heart strain or pulmonary infarct.Coronary atherosclerotic disease.Small hiatal hernia.  Chest x-ray is unremarkable.    - EKG showed Sinus rhythm, Left anterior fascicular block, Probable anteroseptal infarct, old.    - D-dimer 1.42  -  Patient started on heparin drip in the ED.-continue heparin drip  - consult to see what anticoagulations insurance may cover upon discharge  -We will switch patient to oral anticoagulation in am  -Continuous cardiac monitoring  -check trop, ekg     Weakness- Likely mutifactorial  - PT/OT consulted      Recent diagnosis of UTI  -Continue amoxicillin     Essential hypertension-Controlled  -Continue amlodipine 9     Bilateral lower leg edema  -Continue Lasix as needed     GERD  -Continue pantoprazole     Depression  -Continue Prozac     Dementia  -Patient alert and oriented x2 at this time  -Continue Aricept     Obesity  -BMI 31.5  -Encouraged lifestyle modifications     Disposition  Per clinical course    dw daughter concern patient is alone at  home, unable to take meds,   Can not care for self     Fabrice Kiser MD  10/25/19  8:47 AM         Daily Assessment

## 2020-03-31 ENCOUNTER — INPATIENT (INPATIENT)
Facility: HOSPITAL | Age: 85
LOS: 8 days | Discharge: NURSING, REHAB OR LONG-TERM CARE | DRG: 177 | End: 2020-04-09
Attending: INTERNAL MEDICINE | Admitting: HOSPITALIST
Payer: COMMERCIAL

## 2020-03-31 VITALS
SYSTOLIC BLOOD PRESSURE: 200 MMHG | HEART RATE: 102 BPM | OXYGEN SATURATION: 85 % | TEMPERATURE: 100 F | WEIGHT: 164.02 LBS | RESPIRATION RATE: 30 BRPM | DIASTOLIC BLOOD PRESSURE: 95 MMHG

## 2020-03-31 DIAGNOSIS — J96.01 ACUTE RESPIRATORY FAILURE WITH HYPOXIA: ICD-10-CM

## 2020-03-31 DIAGNOSIS — I48.91 UNSPECIFIED ATRIAL FIBRILLATION: ICD-10-CM

## 2020-03-31 DIAGNOSIS — J18.9 PNEUMONIA, UNSPECIFIED ORGANISM: ICD-10-CM

## 2020-03-31 DIAGNOSIS — I10 ESSENTIAL (PRIMARY) HYPERTENSION: ICD-10-CM

## 2020-03-31 DIAGNOSIS — I25.10 ATHEROSCLEROTIC HEART DISEASE OF NATIVE CORONARY ARTERY WITHOUT ANGINA PECTORIS: ICD-10-CM

## 2020-03-31 DIAGNOSIS — I50.21 ACUTE SYSTOLIC (CONGESTIVE) HEART FAILURE: ICD-10-CM

## 2020-03-31 PROBLEM — D56.0 ALPHA THALASSEMIA: Chronic | Status: ACTIVE | Noted: 2018-11-27

## 2020-03-31 LAB
ALBUMIN SERPL ELPH-MCNC: 2.9 G/DL — LOW (ref 3.3–5)
ALP SERPL-CCNC: 105 U/L — SIGNIFICANT CHANGE UP (ref 30–120)
ALT FLD-CCNC: 23 U/L DA — SIGNIFICANT CHANGE UP (ref 10–60)
ANION GAP SERPL CALC-SCNC: 10 MMOL/L — SIGNIFICANT CHANGE UP (ref 5–17)
ANION GAP SERPL CALC-SCNC: 10 MMOL/L — SIGNIFICANT CHANGE UP (ref 5–17)
APPEARANCE UR: CLEAR — SIGNIFICANT CHANGE UP
AST SERPL-CCNC: 62 U/L — HIGH (ref 10–40)
BILIRUB SERPL-MCNC: 1.2 MG/DL — SIGNIFICANT CHANGE UP (ref 0.2–1.2)
BILIRUB UR-MCNC: NEGATIVE — SIGNIFICANT CHANGE UP
BUN SERPL-MCNC: 21 MG/DL — SIGNIFICANT CHANGE UP (ref 7–23)
BUN SERPL-MCNC: 21 MG/DL — SIGNIFICANT CHANGE UP (ref 7–23)
CALCIUM SERPL-MCNC: 8.3 MG/DL — LOW (ref 8.4–10.5)
CALCIUM SERPL-MCNC: 8.4 MG/DL — SIGNIFICANT CHANGE UP (ref 8.4–10.5)
CHLORIDE SERPL-SCNC: 92 MMOL/L — LOW (ref 96–108)
CHLORIDE SERPL-SCNC: 93 MMOL/L — LOW (ref 96–108)
CK MB BLD-MCNC: 0.8 % — SIGNIFICANT CHANGE UP (ref 0–3.5)
CK MB CFR SERPL CALC: 0.7 NG/ML — SIGNIFICANT CHANGE UP (ref 0–3.6)
CK SERPL-CCNC: 92 U/L — SIGNIFICANT CHANGE UP (ref 39–308)
CO2 SERPL-SCNC: 21 MMOL/L — LOW (ref 22–31)
CO2 SERPL-SCNC: 27 MMOL/L — SIGNIFICANT CHANGE UP (ref 22–31)
COLOR SPEC: YELLOW — SIGNIFICANT CHANGE UP
CREAT SERPL-MCNC: 1.41 MG/DL — HIGH (ref 0.5–1.3)
CREAT SERPL-MCNC: 1.42 MG/DL — HIGH (ref 0.5–1.3)
CRP SERPL-MCNC: 7.99 MG/DL — HIGH (ref 0–0.4)
D DIMER BLD IA.RAPID-MCNC: 612 NG/ML DDU — HIGH
DIFF PNL FLD: ABNORMAL
FERRITIN SERPL-MCNC: 538 NG/ML — HIGH (ref 30–400)
GLUCOSE SERPL-MCNC: 118 MG/DL — HIGH (ref 70–99)
GLUCOSE SERPL-MCNC: 167 MG/DL — HIGH (ref 70–99)
GLUCOSE UR QL: NEGATIVE MG/DL — SIGNIFICANT CHANGE UP
HCT VFR BLD CALC: 35.3 % — LOW (ref 39–50)
HGB BLD-MCNC: 11.3 G/DL — LOW (ref 13–17)
KETONES UR-MCNC: NEGATIVE — SIGNIFICANT CHANGE UP
LDH SERPL L TO P-CCNC: 522 U/L — HIGH (ref 50–242)
LEUKOCYTE ESTERASE UR-ACNC: NEGATIVE — SIGNIFICANT CHANGE UP
MAGNESIUM SERPL-MCNC: 1.6 MG/DL — SIGNIFICANT CHANGE UP (ref 1.6–2.6)
MCHC RBC-ENTMCNC: 21.4 PG — LOW (ref 27–34)
MCHC RBC-ENTMCNC: 32 GM/DL — SIGNIFICANT CHANGE UP (ref 32–36)
MCV RBC AUTO: 66.7 FL — LOW (ref 80–100)
NITRITE UR-MCNC: NEGATIVE — SIGNIFICANT CHANGE UP
NRBC # BLD: 0 /100 WBCS — SIGNIFICANT CHANGE UP (ref 0–0)
NT-PROBNP SERPL-SCNC: 4229 PG/ML — HIGH (ref 0–450)
OSMOLALITY SERPL: 282 MOSMOL/KG — SIGNIFICANT CHANGE UP (ref 280–301)
PH UR: 7 — SIGNIFICANT CHANGE UP (ref 5–8)
PLATELET # BLD AUTO: 165 K/UL — SIGNIFICANT CHANGE UP (ref 150–400)
POTASSIUM SERPL-MCNC: 3.8 MMOL/L — SIGNIFICANT CHANGE UP (ref 3.5–5.3)
POTASSIUM SERPL-MCNC: 5.6 MMOL/L — HIGH (ref 3.5–5.3)
POTASSIUM SERPL-SCNC: 3.8 MMOL/L — SIGNIFICANT CHANGE UP (ref 3.5–5.3)
POTASSIUM SERPL-SCNC: 5.6 MMOL/L — HIGH (ref 3.5–5.3)
PROT SERPL-MCNC: 8.5 G/DL — HIGH (ref 6–8.3)
PROT UR-MCNC: 500 MG/DL
RBC # BLD: 5.29 M/UL — SIGNIFICANT CHANGE UP (ref 4.2–5.8)
RBC # FLD: 16 % — HIGH (ref 10.3–14.5)
SODIUM SERPL-SCNC: 123 MMOL/L — LOW (ref 135–145)
SODIUM SERPL-SCNC: 130 MMOL/L — LOW (ref 135–145)
SP GR SPEC: 1.01 — SIGNIFICANT CHANGE UP (ref 1.01–1.02)
TROPONIN I SERPL-MCNC: 0.09 NG/ML — HIGH (ref 0.02–0.06)
TROPONIN I SERPL-MCNC: 0.13 NG/ML — HIGH (ref 0.02–0.06)
UROBILINOGEN FLD QL: NEGATIVE MG/DL — SIGNIFICANT CHANGE UP
WBC # BLD: 5.91 K/UL — SIGNIFICANT CHANGE UP (ref 3.8–10.5)
WBC # FLD AUTO: 5.91 K/UL — SIGNIFICANT CHANGE UP (ref 3.8–10.5)

## 2020-03-31 PROCEDURE — 71045 X-RAY EXAM CHEST 1 VIEW: CPT | Mod: 26

## 2020-03-31 PROCEDURE — 99285 EMERGENCY DEPT VISIT HI MDM: CPT

## 2020-03-31 PROCEDURE — 93010 ELECTROCARDIOGRAM REPORT: CPT

## 2020-03-31 PROCEDURE — 99223 1ST HOSP IP/OBS HIGH 75: CPT | Mod: AI

## 2020-03-31 RX ORDER — FUROSEMIDE 40 MG
80 TABLET ORAL ONCE
Refills: 0 | Status: COMPLETED | OUTPATIENT
Start: 2020-03-31 | End: 2020-03-31

## 2020-03-31 RX ORDER — SODIUM CHLORIDE 9 MG/ML
250 INJECTION INTRAMUSCULAR; INTRAVENOUS; SUBCUTANEOUS ONCE
Refills: 0 | Status: COMPLETED | OUTPATIENT
Start: 2020-03-31 | End: 2020-03-31

## 2020-03-31 RX ORDER — ZINC SULFATE TAB 220 MG (50 MG ZINC EQUIVALENT) 220 (50 ZN) MG
220 TAB ORAL DAILY
Refills: 0 | Status: DISCONTINUED | OUTPATIENT
Start: 2020-03-31 | End: 2020-04-01

## 2020-03-31 RX ORDER — AZITHROMYCIN 500 MG/1
500 TABLET, FILM COATED ORAL EVERY 24 HOURS
Refills: 0 | Status: DISCONTINUED | OUTPATIENT
Start: 2020-04-01 | End: 2020-04-01

## 2020-03-31 RX ORDER — CEFTRIAXONE 500 MG/1
1000 INJECTION, POWDER, FOR SOLUTION INTRAMUSCULAR; INTRAVENOUS ONCE
Refills: 0 | Status: COMPLETED | OUTPATIENT
Start: 2020-03-31 | End: 2020-03-31

## 2020-03-31 RX ORDER — ASCORBIC ACID 60 MG
500 TABLET,CHEWABLE ORAL DAILY
Refills: 0 | Status: DISCONTINUED | OUTPATIENT
Start: 2020-03-31 | End: 2020-04-01

## 2020-03-31 RX ORDER — NITROGLYCERIN 6.5 MG
1 CAPSULE, EXTENDED RELEASE ORAL ONCE
Refills: 0 | Status: COMPLETED | OUTPATIENT
Start: 2020-03-31 | End: 2020-03-31

## 2020-03-31 RX ORDER — APIXABAN 2.5 MG/1
5 TABLET, FILM COATED ORAL
Refills: 0 | Status: DISCONTINUED | OUTPATIENT
Start: 2020-03-31 | End: 2020-04-01

## 2020-03-31 RX ORDER — METOPROLOL TARTRATE 50 MG
50 TABLET ORAL DAILY
Refills: 0 | Status: DISCONTINUED | OUTPATIENT
Start: 2020-03-31 | End: 2020-04-01

## 2020-03-31 RX ORDER — NITROGLYCERIN 6.5 MG
0.4 CAPSULE, EXTENDED RELEASE ORAL ONCE
Refills: 0 | Status: COMPLETED | OUTPATIENT
Start: 2020-03-31 | End: 2020-03-31

## 2020-03-31 RX ORDER — AZITHROMYCIN 500 MG/1
500 TABLET, FILM COATED ORAL ONCE
Refills: 0 | Status: COMPLETED | OUTPATIENT
Start: 2020-03-31 | End: 2020-03-31

## 2020-03-31 RX ORDER — ATORVASTATIN CALCIUM 80 MG/1
40 TABLET, FILM COATED ORAL AT BEDTIME
Refills: 0 | Status: DISCONTINUED | OUTPATIENT
Start: 2020-03-31 | End: 2020-04-01

## 2020-03-31 RX ORDER — CEFTRIAXONE 500 MG/1
1000 INJECTION, POWDER, FOR SOLUTION INTRAMUSCULAR; INTRAVENOUS EVERY 24 HOURS
Refills: 0 | Status: DISCONTINUED | OUTPATIENT
Start: 2020-04-01 | End: 2020-04-01

## 2020-03-31 RX ORDER — SIMVASTATIN 20 MG/1
1 TABLET, FILM COATED ORAL
Qty: 0 | Refills: 0 | DISCHARGE

## 2020-03-31 RX ORDER — SODIUM CHLORIDE 9 MG/ML
1 INJECTION INTRAMUSCULAR; INTRAVENOUS; SUBCUTANEOUS EVERY 6 HOURS
Refills: 0 | Status: COMPLETED | OUTPATIENT
Start: 2020-03-31 | End: 2020-03-31

## 2020-03-31 RX ORDER — ACETAMINOPHEN 500 MG
650 TABLET ORAL ONCE
Refills: 0 | Status: COMPLETED | OUTPATIENT
Start: 2020-03-31 | End: 2020-03-31

## 2020-03-31 RX ORDER — SODIUM POLYSTYRENE SULFONATE 4.1 MEQ/G
30 POWDER, FOR SUSPENSION ORAL ONCE
Refills: 0 | Status: COMPLETED | OUTPATIENT
Start: 2020-03-31 | End: 2020-03-31

## 2020-03-31 RX ORDER — LISINOPRIL 2.5 MG/1
20 TABLET ORAL DAILY
Refills: 0 | Status: DISCONTINUED | OUTPATIENT
Start: 2020-03-31 | End: 2020-04-01

## 2020-03-31 RX ORDER — LISINOPRIL 2.5 MG/1
1 TABLET ORAL
Qty: 0 | Refills: 0 | DISCHARGE

## 2020-03-31 RX ORDER — FUROSEMIDE 40 MG
20 TABLET ORAL DAILY
Refills: 0 | Status: DISCONTINUED | OUTPATIENT
Start: 2020-03-31 | End: 2020-04-01

## 2020-03-31 RX ADMIN — SODIUM POLYSTYRENE SULFONATE 30 GRAM(S): 4.1 POWDER, FOR SUSPENSION ORAL at 06:40

## 2020-03-31 RX ADMIN — ZINC SULFATE TAB 220 MG (50 MG ZINC EQUIVALENT) 220 MILLIGRAM(S): 220 (50 ZN) TAB at 11:24

## 2020-03-31 RX ADMIN — AZITHROMYCIN 500 MILLIGRAM(S): 500 TABLET, FILM COATED ORAL at 03:30

## 2020-03-31 RX ADMIN — Medication 20 MILLIGRAM(S): at 11:24

## 2020-03-31 RX ADMIN — Medication 650 MILLIGRAM(S): at 02:00

## 2020-03-31 RX ADMIN — LISINOPRIL 20 MILLIGRAM(S): 2.5 TABLET ORAL at 06:39

## 2020-03-31 RX ADMIN — CEFTRIAXONE 1000 MILLIGRAM(S): 500 INJECTION, POWDER, FOR SOLUTION INTRAMUSCULAR; INTRAVENOUS at 02:30

## 2020-03-31 RX ADMIN — Medication 80 MILLIGRAM(S): at 01:34

## 2020-03-31 RX ADMIN — CEFTRIAXONE 100 MILLIGRAM(S): 500 INJECTION, POWDER, FOR SOLUTION INTRAMUSCULAR; INTRAVENOUS at 02:00

## 2020-03-31 RX ADMIN — Medication 1 INCH(S): at 01:29

## 2020-03-31 RX ADMIN — SODIUM CHLORIDE 250 MILLILITER(S): 9 INJECTION INTRAMUSCULAR; INTRAVENOUS; SUBCUTANEOUS at 03:12

## 2020-03-31 RX ADMIN — SODIUM CHLORIDE 250 MILLILITER(S): 9 INJECTION INTRAMUSCULAR; INTRAVENOUS; SUBCUTANEOUS at 02:12

## 2020-03-31 RX ADMIN — Medication 0.4 MILLIGRAM(S): at 01:17

## 2020-03-31 RX ADMIN — AZITHROMYCIN 255 MILLIGRAM(S): 500 TABLET, FILM COATED ORAL at 02:30

## 2020-03-31 RX ADMIN — SODIUM CHLORIDE 1 GRAM(S): 9 INJECTION INTRAMUSCULAR; INTRAVENOUS; SUBCUTANEOUS at 15:05

## 2020-03-31 RX ADMIN — Medication 0.4 MILLIGRAM(S): at 01:33

## 2020-03-31 RX ADMIN — APIXABAN 5 MILLIGRAM(S): 2.5 TABLET, FILM COATED ORAL at 17:32

## 2020-03-31 RX ADMIN — Medication 500 MILLIGRAM(S): at 11:24

## 2020-03-31 RX ADMIN — SODIUM CHLORIDE 1 GRAM(S): 9 INJECTION INTRAMUSCULAR; INTRAVENOUS; SUBCUTANEOUS at 23:04

## 2020-03-31 RX ADMIN — APIXABAN 5 MILLIGRAM(S): 2.5 TABLET, FILM COATED ORAL at 06:39

## 2020-03-31 RX ADMIN — Medication 1 INCH(S): at 15:01

## 2020-03-31 NOTE — CHART NOTE - NSCHARTNOTEFT_GEN_A_CORE
Patient seen and examined at bedside. H and P reviewed. Agree with the above with the following addenda    Patient this am offers no complaints    Currently on 02.    seen today by Cards/Nephro/Critical care      Probnp 6000  trop .09    on cef/azithro  AFEBRILE  cultures pending    Family wants patient transferred to [place]      at this time:    -trend second trop  -recheck sodium now  -will need to check with fam on modalities and accepting physician for transfer. also with 02 requirements ? is this a safe transfer

## 2020-03-31 NOTE — CONSULT NOTE ADULT - PROBLEM SELECTOR RECOMMENDATION 2
No angina; continue medical management with metoprolol, atorvastatin; no aspirin given anticoagulation.
observe

## 2020-03-31 NOTE — CONSULT NOTE ADULT - SUBJECTIVE AND OBJECTIVE BOX
REASON FOR CONSULT: Hypoxia, b/l pneumonia     CONSULT REQUESTED BY:  Dr Lezama     Patient is a 86y old  Male Pmhx RV dysfx, CAD/CABG, HLD, HTN, afib-on Eliquis, former smoker-quit 20yrs ago, CVA w/ no known deficits  who presents with a chief complaint of progressive SOB over 2wks. pt was seeing his Cardiologist at [facility] and was diuresing him on  lasix 20mg Po everyother day. Today he was acutety SOB, tachypneic, and O2S 84% RA and brought to [place] ED. Pt admits his SOB improved. Denies CP. Low grade temp 100F.       PAST MEDICAL & SURGICAL HISTORY:  Alpha thalassemia  HTN (hypertension)  Dyslipidemia  Myocardial infarction:   CAD (coronary artery disease)  S/P CAB in [facility]    Allergies    No Known Allergies    Intolerances      FAMILY HISTORY:  No pertinent family history in first degree relatives      Review of Systems:  CONSTITUTIONAL: +fever, chills, or fatigue  EYES: No eye pain, visual disturbances, or discharge  ENMT:  No difficulty hearing, tinnitus, vertigo; No sinus or throat pain  NECK: No pain or stiffness  RESPIRATORY: No cough, wheezing, chills or hemoptysis; + shortness of breath  CARDIOVASCULAR: No chest pain, palpitations, dizziness, or leg swelling  GASTROINTESTINAL: No abdominal or epigastric pain. No nausea, vomiting, or hematemesis; No diarrhea or constipation. No melena or hematochezia.  GENITOURINARY: No dysuria, frequency, hematuria, or incontinence  NEUROLOGICAL: No headaches, memory loss, loss of strength, numbness, or tremors  SKIN: No itching, burning, rashes, or lesions   MUSCULOSKELETAL: No joint pain or swelling; No muscle, back, or extremity pain +edema   PSYCHIATRIC: No depression, anxiety, mood swings, or difficulty sleeping      Medications:  azithromycin  IVPB 500 milliGRAM(s) IV Intermittent once                                  ICU Vital Signs Last 24 Hrs  T(C): 37.8 (31 Mar 2020 00:51), Max: 37.8 (31 Mar 2020 00:51)  T(F): 100 (31 Mar 2020 00:51), Max: 100 (31 Mar 2020 00:51)  HR: 95 (31 Mar 2020 02:16) (92 - 102)  BP: 139/74 (31 Mar 2020 02:16) (139/74 - 200/95)  BP(mean): --  ABP: --  ABP(mean): --  RR: 30 (31 Mar 2020 02:16) (30 - 35)  SpO2: 100% (31 Mar 2020 02:16) (85% - 100%)    Vital Signs Last 24 Hrs  T(C): 37.8 (31 Mar 2020 00:51), Max: 37.8 (31 Mar 2020 00:51)  T(F): 100 (31 Mar 2020 00:51), Max: 100 (31 Mar 2020 00:51)  HR: 95 (31 Mar 2020 02:16) (92 - 102)  BP: 139/74 (31 Mar 2020 02:16) (139/74 - 200/95)  BP(mean): --  RR: 30 (31 Mar 2020 02:16) (30 - 35)  SpO2: 100% (31 Mar 2020 02:16) (85% - 100%)        I&O's Detail        LABS:                        11.3   5.91  )-----------( 165      ( 31 Mar 2020 01:26 )             35.3     03-31    123<L>  |  92<L>  |  21  ----------------------------<  167<H>  5.6<H>   |  21<L>  |  1.41<H>    Ca    8.4      31 Mar 2020 01:26  Mg     1.6     -31    TPro  8.5<H>  /  Alb  2.9<L>  /  TBili  1.2  /  DBili  x   /  AST  62<H>  /  ALT  23  /  AlkPhos  105  03-31      CARDIAC MARKERS ( 31 Mar 2020 01:26 )  .091 ng/mL / x     / 92 U/L / x     / 0.7 ng/mL      CAPILLARY BLOOD GLUCOSE          Urinalysis Basic - ( 31 Mar 2020 01:55 )    Color: Yellow / Appearance: Clear / S.010 / pH: x  Gluc: x / Ketone: Negative  / Bili: Negative / Urobili: Negative mg/dL   Blood: x / Protein: 500 mg/dL / Nitrite: Negative   Leuk Esterase: Negative / RBC: 0-2 /HPF / WBC 0-2   Sq Epi: x / Non Sq Epi: Occasional / Bacteria: Occasional      CULTURES:      Physical Examination:    General: No acute distress.  Alert, oriented, interactive, nonfocal    HEENT:   Symmetric.    PULM: decreased BS at the bases, few wheezes left base    CVS: irreg irreg     ABD: Soft, nondistended, nontender, normoactive bowel sounds, no masses    EXT: No edema, nontender    SKIN: Warm and well perfused, no rashes noted.    RADIOLOGY:   CXR>IMPRESSION:     Extensive bilateral airspace opacities most suspicious for pneumonia, including viral infection. Superimposed edema is not excluded. Findings were discussed with Dr. Lezama at 1:49 AM on3/.

## 2020-03-31 NOTE — CONSULT NOTE ADULT - ASSESSMENT
6y old  Male Pmhx RV dysfx, CAD/CABG, HLD, HTN, afib-on Eliquis, former smoker-quit 20yrs ago, CVA w/ no known deficits adm hy;poxia 2/2 acute on chronic diastolic HF,  b/l infiltrates, r/o COVID-19, and hyponatremia.  Pt hemodynamically stable and o2S 98 on 4l NC.  No indication to admit to ICU. Stable for Tele fl. Emperic abx to cover CAP. r/o COVID, Cardio eval. Call for further question or if patient continues to decompensates.     GOC and care discussed w/ son and grandaughter at length. All questions answered. Pt is full code.     Case discussed w/ EICU and agrees with plan.               CRITICAL CARE TIME SPENT: 42min

## 2020-03-31 NOTE — CONSULT NOTE ADULT - SUBJECTIVE AND OBJECTIVE BOX
CHIEF COMPLAINT: No complaint offered at time of visit    HPI:  86 year old man with a history of CAD, MI, remote CABG, HTN, HLD, AF, CVA, moderate pulmonary hypertension, RV dysfunction, and thalassemia was brought to the ED by family due to dyspnea and low SPO2 at home.  Mr Candy is a poor informant.  He apparently has been experiencing edema and SOB x several months that was being treated with diuretic therapy; BP had been elevated and Lisinopril dose was recently uptitrated.  He was brought to the ED by family because of worsening dyspnea and low SPO2 -- SPO2 was low at rest and worsened with ambulation.  His family reports preserved LV function on echo in 2018.  In the ED his BP was 200/95 and he was hypoxic upon arrival.  He was placed on isolation due to low grade fever (100 F) and symptoms with COVID result pending.    PAST MEDICAL & SURGICAL HISTORY: As above in HPI    SOCIAL HISTORY:   Alcohol: Denied  Smoking: Nonsmoker  Drug Use: Denied  Marital Status:     FAMILY HISTORY: Details not known    MEDICATIONS  (STANDING):  apixaban 5 milliGRAM(s) Oral two times a day  ascorbic acid 500 milliGRAM(s) Oral daily  atorvastatin 40 milliGRAM(s) Oral at bedtime  azithromycin  IVPB 500 milliGRAM(s) IV Intermittent every 24 hours  cefTRIAXone   IVPB 1000 milliGRAM(s) IV Intermittent every 24 hours  lisinopril 20 milliGRAM(s) Oral daily  metoprolol succinate ER 50 milliGRAM(s) Oral daily  zinc sulfate 220 milliGRAM(s) Oral daily    Allergies:  No Known Allergies    REVIEW OF SYSTEMS:  CONSTITUTIONAL: + weaknes  Eyes: No visual changes  NECK: No pain or stiffness  RESPIRATORY: No cough, wheezing, hemoptysis; + shortness of breath  CARDIOVASCULAR: No chest pain or palpitations  GASTROINTESTINAL: + Loose stool.  GENITOURINARY: No dysuria, frequency or hematuria  NEUROLOGICAL: No numbness.  SKIN: No itching or rash  All other review of systems is negative unless indicated above    VITAL SIGNS:   Vital Signs Last 24 Hrs  T(C): 36.5 (31 Mar 2020 07:40), Max: 37.8 (31 Mar 2020 00:51)  T(F): 97.7 (31 Mar 2020 07:40), Max: 100 (31 Mar 2020 00:51)  HR: 76 (31 Mar 2020 07:40) (57 - 102)  BP: 142/63 (31 Mar 2020 07:40) (124/56 - 200/95)  RR: 21 (31 Mar 2020 07:40) (20 - 35)  SpO2: 94% (31 Mar 2020 07:40) (85% - 100%)    PHYSICAL EXAM:  Constitutional: NAD, awake  HEENT:  No oral cyanosis.  Pulmonary: Non-labored but tachypneic   Cardiovascular: Regular  Gastrointestinal: Bowel Sounds present, soft, nontender.   Lymph: + pedal edema. No cervical lymphadenopathy.  Neurological: Alert, no focal deficits  Skin: No rashes.  Psych:  Mood & affect appropriate    LABS:                     11.3   5.91  )-----------( 165      ( 31 Mar 2020 01:26 )             35.3     123    |  92     |  21     ----------------------------<  167    5.6     |  21     |  1.41     CARDIAC MARKERS ( 31 Mar 2020 01:26 ) .091 ng/mL / x     / 92 U/L / x     / 0.7 ng/mL  Pro Bnp 4229    12 Lead ECG (03.31.20 @ 01:02): Atrial fibrillation with premature ventricular or aberrantly conducted complexes, Right bundle branch block, Left posterior fascicular block,   T wave abnormality, consider inferior ischemia.  No previous ECGs available    Xray Chest 1 View- PORTABLE-Urgent (03.31.20 @ 01:51):  Extensive bilateral airspace opacities most suspicious for pneumonia, including viral infection. Superimposed edema is not excluded.

## 2020-03-31 NOTE — H&P ADULT - NSICDXPASTMEDICALHX_GEN_ALL_CORE_FT
PAST MEDICAL HISTORY:  Alpha thalassemia     CAD (coronary artery disease)     Dyslipidemia     HTN (hypertension)     Myocardial infarction 2014

## 2020-03-31 NOTE — CONSULT NOTE ADULT - ASSESSMENT
1.	Hyponatremia: ? Increased ADH state, R/o retention  2.	Dyspnea: Pneumonia, R/o COVID, ? CHF  3.	Hypertension    Pt got IV lasix. Will follow sodium trend. Maintain PO fluid restriction. Check urine sodium, urine osm and serum uric acid level. Avoid hypotonic fluids.   Monitor BP closely. Monitor I & O. Check bladder scan if urinary complaints. Monitor BP trend. Titrate BP meds as needed. COVID precautions.   Will follow electrolytes and renal function trend. Further recommendations pending clinical course. Thank you for the courtesy of this referral.

## 2020-03-31 NOTE — ED ADULT NURSE NOTE - NSIMPLEMENTINTERV_GEN_ALL_ED
Implemented All Universal Safety Interventions:  [place] to call system. Call bell, personal items and telephone within reach. Instruct patient to call for assistance. Room bathroom lighting operational. Non-slip footwear when patient is off stretcher. Physically safe environment: no spills, clutter or unnecessary equipment. Stretcher in lowest position, wheels locked, appropriate side rails in place.

## 2020-03-31 NOTE — CONSULT NOTE ADULT - SUBJECTIVE AND OBJECTIVE BOX
Patient is a 86y old  Male who presents with a chief complaint of SOB (31 Mar 2020 08:19)    HPI:  ***Patient reluctant to give any history ("I don't know why my granddaughter brought me here.  Talk to her. Just talk to her").  Family already left.  Attempted to call emergency contact but [facility].  Collateral information obtained from notes.***    86M with CAD complicated by MI s/p CABG, alpha-thalassemia, HTN/HLD, who presents with SOB.  Per notes, family say patient has been having worsening BLE edema, abdominal distension, and SOB over the past couple of months.  He was then recently started on lasix 20mg every other day.  Patient has been diuresing well but his BP was elevated and his lisinopril was increased from 10 to 20mg.  Last night, patient started have an acute episode of SOB with desats.  Patient was brought here where he was found to be labored with SBP in the 200s.  Patient was given 2 SL NG and lasix 80mg IV x1.  Patient's BP eventually dropped and his breathing improved. (31 Mar 2020 04:58)    Renal consult called for hyponatremia. History obtained from chart.       PAST MEDICAL HISTORY:  Alpha thalassemia  HTN (hypertension)  Dyslipidemia  Myocardial infarction  CAD (coronary artery disease)      PAST SURGICAL HISTORY:  S/P CABG      FAMILY HISTORY:      SOCIAL HISTORY: No smoking or alcohol use     Allergies    No Known Allergies    Intolerances      Home Medications:  Lasix 20 mg oral tablet: 1 tab(s) orally every other day (31 Mar 2020 05:40)  lisinopril 10 mg oral tablet: 2 tab(s) orally once a day (31 Mar 2020 05:40)  Metoprolol Succinate ER 25 mg oral tablet, extended release: 2 tab(s) orally once a day (31 Mar 2020 05:40)  simvastatin 80 mg oral tablet: 1 tab(s) orally once a day (at bedtime) (31 Mar 2020 05:40)    MEDICATIONS  (STANDING):  apixaban 5 milliGRAM(s) Oral two times a day  ascorbic acid 500 milliGRAM(s) Oral daily  atorvastatin 40 milliGRAM(s) Oral at bedtime  azithromycin  IVPB 500 milliGRAM(s) IV Intermittent every 24 hours  cefTRIAXone   IVPB 1000 milliGRAM(s) IV Intermittent every 24 hours  furosemide   Injectable 20 milliGRAM(s) IV Push daily  lisinopril 20 milliGRAM(s) Oral daily  metoprolol succinate ER 50 milliGRAM(s) Oral daily  zinc sulfate 220 milliGRAM(s) Oral daily    MEDICATIONS  (PRN):      REVIEW OF SYSTEMS:  Pt on COVID precautions. Chart reviewed.     T(F): 97.7 (20 @ 07:40), Max: 100 (20 @ 00:51)  HR: 76 (20 @ 07:40) (57 - 102)  BP: 142/63 (20 @ 07:40) (124/56 - 200/95)  RR: 21 (20 @ 07:40) (20 - 35)  SpO2: 94% (20 @ 07:40) (85% - 100%)  Wt(kg): --    PHYSICAL EXAM:  Pt on COVID precautions. Chart reviewed and previous physical examination noted.           123<L>  |  92<L>  |  21  ----------------------------<  167<H>  5.6<H>   |  21<L>  |  1.41<H>    Ca    8.4      31 Mar 2020 01:26  Mg     1.6         TPro  8.5<H>  /  Alb  2.9<L>  /  TBili  1.2  /  DBili  x   /  AST  62<H>  /  ALT  23  /  AlkPhos  105                            11.3   5.91  )-----------( 165      ( 31 Mar 2020 01:26 )             35.3       Calcium, Total Serum: 8.4 mg/dL ( @ 01:26)  Blood Urea Nitrogen, Serum: 21 mg/dL ( @ 01:26)  Potassium, Serum: 5.6 mmol/L ( 01:26)  Hematocrit: 35.3 % ( @ 01:26)      Creatinine, Serum: 1.41 ( @ 01:26)      Urinalysis Basic - ( 31 Mar 2020 01:55 )    Color: Yellow / Appearance: Clear / S.010 / pH: x  Gluc: x / Ketone: Negative  / Bili: Negative / Urobili: Negative mg/dL   Blood: x / Protein: 500 mg/dL / Nitrite: Negative   Leuk Esterase: Negative / RBC: 0-2 /HPF / WBC 0-2   Sq Epi: x / Non Sq Epi: Occasional / Bacteria: Occasional      LIVER FUNCTIONS - ( 31 Mar 2020 01:26 )  Alb: 2.9 g/dL / Pro: 8.5 g/dL / ALK PHOS: 105 U/L / ALT: 23 U/L DA / AST: 62 U/L / GGT: x           CARDIAC MARKERS ( 31 Mar 2020 01:26 )  .091 ng/mL / x     / 92 U/L / x     / 0.7 ng/mL      Creatine Kinase, Serum: 92 U/L (20 @ 01:26)          I&O's Detail    30 Mar 2020 07:01  -  31 Mar 2020 07:00  --------------------------------------------------------  IN:  Total IN: 0 mL    OUT:    Voided: 600 mL  Total OUT: 600 mL    Total NET: -600 mL

## 2020-03-31 NOTE — H&P ADULT - NSHPPHYSICALEXAM_GEN_ALL_CORE
PHYSICAL EXAM:  Vital Signs Last 24 Hrs  T(C): 36.5 (31 Mar 2020 05:00), Max: 37.8 (31 Mar 2020 00:51)  T(F): 97.7 (31 Mar 2020 05:00), Max: 100 (31 Mar 2020 00:51)  HR: 79 (31 Mar 2020 05:00) (79 - 102)  BP: 141/83 (31 Mar 2020 05:00) (139/74 - 200/95)  BP(mean): --  RR: 32 (31 Mar 2020 05:00) (30 - 35)  SpO2: 97% (31 Mar 2020 05:00) (85% - 100%)    GENERAL:     elderly male in NAD  HEAD:     atraumatic  EYES:     EOMI  NECK:     supple, no obvious JVD  RESPIRATORY:     diminished breath sounds bilaterally with slight crackles  CARDIOVASCULAR:     irregular irregular  GASTROINTESTINAL:     soft, nontender, nondistended  EXTREMITIES:     no clubbing or cyanosis or edema  MUSCULOSKELETAL:     no joint pain or swelling or deformities  NERVOUS SYSTEM:     grossly intact,  moving all 4s  SKIN:     no rashes or lesions  PSYCH:     appropriate

## 2020-03-31 NOTE — CONSULT NOTE ADULT - SUBJECTIVE AND OBJECTIVE BOX
PULMONARY/CRITICAL CARE        Patient is a 86y old  Male who presents with a chief complaint of SOB, desats (31 Mar 2020 10:33)  Possible CHF vs COVID PN.    BRIEF HOSPITAL COURSE: ***  86y old  Male who presents with a chief complaint of SOB (31 Mar 2020 08:19)    HPI:      86M with CAD complicated by MI s/p CABG, alpha-thalassemia, HTN/HLD, who presents with SOB.  Per notes, family say patient has been having worsening BLE edema, abdominal distension, and SOB over the past couple of months.  He was then recently started on lasix 20mg every other day.  Patient has been diuresing well but his BP was elevated and his lisinopril was increased from 10 to 20mg.  Last night, patient started have an acute episode of SOB with desats.  Patient was brought here where he was found to be labored with SBP in the 200s.  Patient was given 2 SL NG and lasix 80mg IV x1.  Patient's BP eventually dropped and his breathing improved. (31 Mar 2020 04:58)    Renal consult called for hyponatremia. History obtained from chart.     Events last 24 hours: ***    PAST MEDICAL & SURGICAL HISTORY:  Alpha thalassemia  HTN (hypertension)  Dyslipidemia  Myocardial infarction:   CAD (coronary artery disease)  S/P CAB in [facility]    Allergies    No Known Allergies    Intolerances      FAMILY HISTORY:      Review of Systems:  unobtainable      Medications:  azithromycin  IVPB 500 milliGRAM(s) IV Intermittent every 24 hours  cefTRIAXone   IVPB 1000 milliGRAM(s) IV Intermittent every 24 hours    furosemide   Injectable 20 milliGRAM(s) IV Push daily  lisinopril 20 milliGRAM(s) Oral daily  metoprolol succinate ER 50 milliGRAM(s) Oral daily          apixaban 5 milliGRAM(s) Oral two times a day        atorvastatin 40 milliGRAM(s) Oral at bedtime    ascorbic acid 500 milliGRAM(s) Oral daily  sodium chloride 1 Gram(s) Oral every 6 hours  zinc sulfate 220 milliGRAM(s) Oral daily                ICU Vital Signs Last 24 Hrs  T(C): 36.5 (31 Mar 2020 07:40), Max: 37.8 (31 Mar 2020 00:51)  T(F): 97.7 (31 Mar 2020 07:40), Max: 100 (31 Mar 2020 00:51)  HR: 76 (31 Mar 2020 07:40) (57 - 102)  BP: 142/63 (31 Mar 2020 07:40) (124/56 - 200/95)  BP(mean): --  ABP: --  ABP(mean): --  RR: 21 (31 Mar 2020 07:40) (20 - 35)  SpO2: 94% (31 Mar 2020 07:40) (85% - 100%)    Vital Signs Last 24 Hrs  T(C): 36.5 (31 Mar 2020 07:40), Max: 37.8 (31 Mar 2020 00:51)  T(F): 97.7 (31 Mar 2020 07:40), Max: 100 (31 Mar 2020 00:51)  HR: 76 (31 Mar 2020 07:40) (57 - 102)  BP: 142/63 (31 Mar 2020 07:40) (124/56 - 200/95)  BP(mean): --  RR: 21 (31 Mar 2020 07:40) (20 - 35)  SpO2: 94% (31 Mar 2020 07:40) (85% - 100%)        I&O's Detail    30 Mar 2020 07:01  -  31 Mar 2020 07:00  --------------------------------------------------------  IN:  Total IN: 0 mL    OUT:    Voided: 600 mL  Total OUT: 600 mL    Total NET: -600 mL            LABS:                        11.3   5.91  )-----------( 165      ( 31 Mar 2020 01:26 )             35.3     0331    123<L>  |  92<L>  |  21  ----------------------------<  167<H>  5.6<H>   |  21<L>  |  1.41<H>    Ca    8.4      31 Mar 2020 01:26  Mg     1.6         TPro  8.5<H>  /  Alb  2.9<L>  /  TBili  1.2  /  DBili  x   /  AST  62<H>  /  ALT  23  /  AlkPhos  105        CARDIAC MARKERS ( 31 Mar 2020 01:26 )  .091 ng/mL / x     / 92 U/L / x     / 0.7 ng/mL      CAPILLARY BLOOD GLUCOSE          Urinalysis Basic - ( 31 Mar 2020 01:55 )    Color: Yellow / Appearance: Clear / S.010 / pH: x  Gluc: x / Ketone: Negative  / Bili: Negative / Urobili: Negative mg/dL   Blood: x / Protein: 500 mg/dL / Nitrite: Negative   Leuk Esterase: Negative / RBC: 0-2 /HPF / WBC 0-2   Sq Epi: x / Non Sq Epi: Occasional / Bacteria: Occasional      CULTURES:      Physical Examination:    General: No acute distress.  elderly male on nasal O2--sat 98    HEENT: Pupils equal, reactive to light.  Symmetric.    PULM: decreased bs bas crackles.    CVS: Regular rate and rhythm, no murmurs, rubs, or gallops    ABD: Soft, nondistended, nontender, normoactive bowel sounds, no masses    EXT: No edema, nontender    SKIN: Warm and well perfused, no rashes noted.    NEURO: Alert, nteractive, nonfocal  < from: Xray Chest 1 View- PORTABLE-Urgent (20 @ 01:51) >  EXAM:  XR CHEST PORTABLE URGENT 1V                                  PROCEDURE DATE:  2020          INTERPRETATION:  CLINICAL INFORMATION: Shortness of breath.    TECHNIQUE: AP portable view of the chest    COMPARISON:     FINDINGS:    There is stable enlargement of the cardiac silhouette. The aorta is tortuous and atherosclerotic. Patient is status post median sternotomy and CABG. There are extensive bilateral airspace opacities predominantly within the mid to lower lung fields. There is no large effusion or pneumothorax.    IMPRESSION:     Extensive bilateral airspace opacities most suspicious for pneumonia, including viral infection. Superimposed edema is not excluded. Findings were discussed with Dr. Lezama at 1:49 AM on3/.                  ANICETO BARTON M.D., ATTENDING RADIOLOGIST  This document has been electronically signed. Mar 31 2020  1:51AM        < end of copied text >    RADIOLOGY: ***    CRITICAL CARE TIME SPENT: ***

## 2020-03-31 NOTE — ED PROVIDER NOTE - OBJECTIVE STATEMENT
86 y.o. M BIB EMS from home for SOB - per family member (an IM resident at [facility]) pt has had 2 months increasing LE edema, sob, recently started lasix 20mg every other day, seemed to be diuresing well, had increased lisiopril last week 10 to 20mg for HTN, and seemed to improve until today when breathing worsening, sat dropping and bp elevated again

## 2020-03-31 NOTE — ED ADULT NURSE REASSESSMENT NOTE - NS ED NURSE REASSESS COMMENT FT1
0745- Pt received resting quietly on stretcher. Color fair. Skin warm & dry to touch. Lungs- diminished at bases. O2 via NC in use. HL site in rt hand without signs of infiltrate.

## 2020-03-31 NOTE — ED PROVIDER NOTE - NOTES
spoke with [facility] transfer center - over 50pt's boarding in ED without beds, are unable to accept any transfers at this time that are not for higher level of care spoke with [facility] transfer center - over 50pt's boarding in ED without beds, are unable to accept any transfers at this time that are for family request

## 2020-03-31 NOTE — H&P ADULT - HISTORY OF PRESENT ILLNESS
***Patient reluctant to give any history ("I don't know why my granddaughter brought me here.  Talk to her.").  Family already left.  Attempted to call emergency contact but eventually went to voicemail which was not set up yet.  Collateral information obtained from notes.***    86M with CAD complicated by MI s/p CABG, alpha-thalassemia, HTN/HLD, who presents with SOB.  Per notes, family say patient has been having worsening BLE edema, abdominal distension, and SOB over the past couple of months.  He was then recently started on lasix 20mg every other day.  Patient has been diuresing well but his BP was elevated and his lisinopril was increased from 10 to 20mg.  Last night, patient started have an acute episode of SOB with desats.  Patient was brought here where he was found to be labored with SBP in the 200s.  Patient was given 2 SL NG and lasix 80mg IV x1.  Patient's BP eventually dropped and his breathing improved. ***Patient reluctant to give any history ("I don't know why my granddaughter brought me here.  Talk to her. Just talk to her").  Family already left.  Attempted to call emergency contact but eventually went to voicemail which was not set up yet.  Collateral information obtained from notes.***    86M with CAD complicated by MI s/p CABG, alpha-thalassemia, HTN/HLD, who presents with SOB.  Per notes, family say patient has been having worsening BLE edema, abdominal distension, and SOB over the past couple of months.  He was then recently started on lasix 20mg every other day.  Patient has been diuresing well but his BP was elevated and his lisinopril was increased from 10 to 20mg.  Last night, patient started have an acute episode of SOB with desats.  Patient was brought here where he was found to be labored with SBP in the 200s.  Patient was given 2 SL NG and lasix 80mg IV x1.  Patient's BP eventually dropped and his breathing improved.

## 2020-03-31 NOTE — ED PROVIDER NOTE - CLINICAL SUMMARY MEDICAL DECISION MAKING FREE TEXT BOX
86 y.o. M from home, cardiac history, PMH consistent with progressive pulmonary edema/chf exacerbation and with markedly elevated htn tonight, diastolic failure - will give lasix, nitro, o2, reassess; also oral temp 100 in ED, will check cxr, ua for source of elevated temp; per family no risk factors for covid and pt has been isolated at home. will require admission. Family requesting transfer to [facility] (family member works there, have an accepting physician)

## 2020-03-31 NOTE — CONSULT NOTE ADULT - PROBLEM SELECTOR RECOMMENDATION 9
Agree with need to exclude COVID-19 infection in present environment; symptoms/findings likely multifactorial; possible pulmonary edema, pneumonia; diurese with IV Lasix 20mg daily; echo when off isolation precautions.
fu cxr

## 2020-03-31 NOTE — ED PROVIDER NOTE - CARE PLAN
Principal Discharge DX:	Pneumonia  Secondary Diagnosis:	Acute on chronic combined systolic and diastolic congestive heart failure

## 2020-03-31 NOTE — ED PROVIDER NOTE - PROGRESS NOTE DETAILS
pt's granddaughter (the IM resident) has advised me that the hospitalist, Dr. Osito Brito, is willing to accept the transfer of the patient to [facility], I will call transfer center once more of the results are in. After 2 ntg sl pt's bp is much improved (200-->140s systolic) and pt reports improvement in breathing updated pt and family on results - probably chf, need to r/o covid given xr results and temp 100 - per granddaughter pt lives alone, has not been out of house, no travel, family drops off supplies, no family members known to have covid

## 2020-03-31 NOTE — CONSULT NOTE ADULT - ASSESSMENT
Pt with acute respiratory insufficiency, possible CHF vs COVID pneumonia or both.  Awaiting Covid testing.  Apparently family wants to bring pt to [facility]. Have ordered oximetry ra to see if it would be safe to transport without O2.

## 2020-03-31 NOTE — H&P ADULT - ASSESSMENT
86M with CAD complicated by MI s/p CABG, alpha-thalassemia, HTN/HLD, who presents with SOB.    Found to have pneumonia on CXR, possibly 2/2 COVID.  May be in heart failure as well.  With electrolyte abnormalities such as hyponatremia, hyperkalemia.      Pneumonia  - admitted to medicine   - f/u COVID   - f/u COVID labs  - cont with ceftriaxone and azithromycin   - f/u cultures  - start vitamin C and zinc   - monitor O2 sat and supplement as needed    Heart failure?    - cardiology consult  - already received lasix 80mg IV in ED  - monitor I/Os  - already on ACEi  - BP stable now    Electrolyte abnormalities - hyponatremia, hyperkalemia  - adrenal insufficiency?    - could be a component from heart failure/renal disease  - free water restrict for now   - nephrology consult  - order kayexelate x1 for now    CAD  - cont with atorvastatin and ASA    Afib  - cont with metoprolol  - cont with eliquis     Preventive measures  IMPROVE VTE Individual Risk Assessment          RISK                                                          Points  [  ] Previous VTE                                                 3  [  ] Thrombophilia                                              2  [  ] Lower limb paralysis                                    2        (unable to hold up >15 seconds)    [  ] Current Cancer                                             2         (within 6 months)  [  ] Immobilization > 24 hrs                              1  [  ] ICU/CCU stay > 24 hours                            1  [X] Age > 60                                                        1    IMPROVE VTE Score 1    - will be on eliquis already 86M with CAD complicated by MI s/p CABG, alpha-thalassemia, HTN/HLD, who presents with SOB.    Found to have pneumonia on CXR, possibly 2/2 COVID.  May be in heart failure as well.  With electrolyte abnormalities such as hyponatremia, hyperkalemia.      Pneumonia  - admitted to medicine   - f/u COVID   - f/u COVID labs  - cont with ceftriaxone and azithromycin   - f/u cultures  - start vitamin C and zinc   - monitor O2 sat and supplement as needed    Heart failure?    - cardiology consult  - already received lasix 80mg IV in ED  - monitor I/Os  - already on ACEi  - BP stable now    Electrolyte abnormalities - hyponatremia, hyperkalemia  - adrenal insufficiency?    - could be a component from heart failure/renal disease  - free water restrict for now   - nephrology consult  - order kayexelate x1 for now  - check urine and serum osms    CAD  - cont with atorvastatin and ASA    Afib  - cont with metoprolol  - cont with eliquis     Preventive measures  IMPROVE VTE Individual Risk Assessment          RISK                                                          Points  [  ] Previous VTE                                                 3  [  ] Thrombophilia                                              2  [  ] Lower limb paralysis                                    2        (unable to hold up >15 seconds)    [  ] Current Cancer                                             2         (within 6 months)  [  ] Immobilization > 24 hrs                              1  [  ] ICU/CCU stay > 24 hours                            1  [X] Age > 60                                                        1    IMPROVE VTE Score 1    - will be on eliquis already 86M with CAD complicated by MI s/p CABG, alpha-thalassemia, HTN/HLD, who presents with SOB.    Found to have pneumonia on CXR, possibly 2/2 COVID.  May be in heart failure as well.  With electrolyte abnormalities such as hyponatremia, hyperkalemia.      Pneumonia  - admitted to medicine   - f/u COVID   - f/u COVID labs  - cont with ceftriaxone and azithromycin   - f/u cultures  - start vitamin C and zinc   - monitor O2 sat and supplement as needed    Heart failure?    - cardiology consult  - already received lasix 80mg IV in ED  - monitor I/Os  - already on ACEi  - BP stable now    Electrolyte abnormalities - hyponatremia, hyperkalemia  - could be a component from heart failure/renal disease  - free water restrict for now   - nephrology consult  - order kayexelate x1 for now  - check urine and serum osms    CAD  - cont with atorvastatin and ASA    Afib  - cont with metoprolol  - cont with eliquis     Preventive measures  IMPROVE VTE Individual Risk Assessment          RISK                                                          Points  [  ] Previous VTE                                                 3  [  ] Thrombophilia                                              2  [  ] Lower limb paralysis                                    2        (unable to hold up >15 seconds)    [  ] Current Cancer                                             2         (within 6 months)  [  ] Immobilization > 24 hrs                              1  [  ] ICU/CCU stay > 24 hours                            1  [X] Age > 60                                                        1    IMPROVE VTE Score 1    - will be on eliquis already

## 2020-03-31 NOTE — H&P ADULT - NSHPLABSRESULTS_GEN_ALL_CORE
LABS:                        11.3<L>  5.91  )-----------( 165      ( 31 Mar 2020 01:26 )             35.3<L>    123<L>  |  92<L>  |  21     ----------------------------<  167<H>    31 Mar 2020 01:26  5.6<H>   |  21<L>  |  1.41<H>    Ca 8.4           31 Mar 2020 01:26    Mg 1.6       31 Mar 2020 01:26    TPro  8.5<H>  /  Alb  2.9<L>  /  TBili  1.2    /  DBili  x      /  AST  62<H>  /  ALT  23     /  AlkPhos  105    31 Mar 2020 01:26    Urinalysis Basic - ( 31 Mar 2020 01:55 )    Color: Yellow / Appearance: Clear / S.010 / pH: x  Gluc: x / Ketone: Negative  / Bili: Negative / Urobili: Negative mg/dL   Blood: x / Protein: 500 mg/dL / Nitrite: Negative   Leuk Esterase: Negative / RBC: 0-2 /HPF / WBC 0-2   Sq Epi: x / Non Sq Epi: Occasional / Bacteria: Occasional    Troponin trend:  .091   @ 01:26    EKG:   afib with RBBB and LAFB  Radiology:  < from: Xray Chest 1 View- PORTABLE-Urgent (20 @ 01:51) >    IMPRESSION:     Extensive bilateral airspace opacities most suspicious for pneumonia, including viral infection. Superimposed edema is not excluded. Findings were discussed with Dr. Lezama at 1:49 AM on3/.    < end of copied text >

## 2020-04-01 ENCOUNTER — OUTPATIENT (OUTPATIENT)
Dept: OUTPATIENT SERVICES | Facility: HOSPITAL | Age: 85
LOS: 1 days | End: 2020-04-01
Payer: MEDICARE

## 2020-04-01 DIAGNOSIS — I50.9 HEART FAILURE, UNSPECIFIED: ICD-10-CM

## 2020-04-01 DIAGNOSIS — J96.01 ACUTE RESPIRATORY FAILURE WITH HYPOXIA: ICD-10-CM

## 2020-04-01 DIAGNOSIS — B97.21 SARS-ASSOCIATED CORONAVIRUS AS THE CAUSE OF DISEASES CLASSIFIED ELSEWHERE: ICD-10-CM

## 2020-04-01 DIAGNOSIS — I10 ESSENTIAL (PRIMARY) HYPERTENSION: ICD-10-CM

## 2020-04-01 DIAGNOSIS — Z29.9 ENCOUNTER FOR PROPHYLACTIC MEASURES, UNSPECIFIED: ICD-10-CM

## 2020-04-01 LAB
ANION GAP SERPL CALC-SCNC: 12 MMOL/L — SIGNIFICANT CHANGE UP (ref 5–17)
BUN SERPL-MCNC: 22 MG/DL — SIGNIFICANT CHANGE UP (ref 7–23)
CALCIUM SERPL-MCNC: 8 MG/DL — LOW (ref 8.4–10.5)
CHLORIDE SERPL-SCNC: 93 MMOL/L — LOW (ref 96–108)
CO2 SERPL-SCNC: 25 MMOL/L — SIGNIFICANT CHANGE UP (ref 22–31)
CREAT SERPL-MCNC: 1.38 MG/DL — HIGH (ref 0.5–1.3)
CRP SERPL-MCNC: 11.29 MG/DL — HIGH (ref 0–0.4)
GLUCOSE SERPL-MCNC: 104 MG/DL — HIGH (ref 70–99)
MAGNESIUM SERPL-MCNC: 1.5 MG/DL — LOW (ref 1.6–2.6)
PHOSPHATE SERPL-MCNC: 3.7 MG/DL — SIGNIFICANT CHANGE UP (ref 2.5–4.5)
POTASSIUM SERPL-MCNC: 4.1 MMOL/L — SIGNIFICANT CHANGE UP (ref 3.5–5.3)
POTASSIUM SERPL-SCNC: 4.1 MMOL/L — SIGNIFICANT CHANGE UP (ref 3.5–5.3)
SARS-COV-2 RNA SPEC QL NAA+PROBE: DETECTED
SODIUM SERPL-SCNC: 130 MMOL/L — LOW (ref 135–145)

## 2020-04-01 PROCEDURE — 71045 X-RAY EXAM CHEST 1 VIEW: CPT | Mod: 26

## 2020-04-01 PROCEDURE — 99232 SBSQ HOSP IP/OBS MODERATE 35: CPT

## 2020-04-01 PROCEDURE — 12345: CPT | Mod: NC

## 2020-04-01 PROCEDURE — G9001: CPT

## 2020-04-01 RX ORDER — HYDROXYCHLOROQUINE SULFATE 200 MG
TABLET ORAL
Refills: 0 | Status: COMPLETED | OUTPATIENT
Start: 2020-04-01 | End: 2020-04-06

## 2020-04-01 RX ORDER — FUROSEMIDE 40 MG
20 TABLET ORAL DAILY
Refills: 0 | Status: DISCONTINUED | OUTPATIENT
Start: 2020-04-01 | End: 2020-04-02

## 2020-04-01 RX ORDER — HYDROXYCHLOROQUINE SULFATE 200 MG
400 TABLET ORAL EVERY 12 HOURS
Refills: 0 | Status: COMPLETED | OUTPATIENT
Start: 2020-04-01 | End: 2020-04-02

## 2020-04-01 RX ORDER — APIXABAN 2.5 MG/1
5 TABLET, FILM COATED ORAL
Refills: 0 | Status: DISCONTINUED | OUTPATIENT
Start: 2020-04-01 | End: 2020-04-09

## 2020-04-01 RX ORDER — ACETAMINOPHEN 500 MG
650 TABLET ORAL ONCE
Refills: 0 | Status: COMPLETED | OUTPATIENT
Start: 2020-04-01 | End: 2020-04-01

## 2020-04-01 RX ORDER — ATORVASTATIN CALCIUM 80 MG/1
40 TABLET, FILM COATED ORAL AT BEDTIME
Refills: 0 | Status: DISCONTINUED | OUTPATIENT
Start: 2020-04-01 | End: 2020-04-01

## 2020-04-01 RX ORDER — METOPROLOL TARTRATE 50 MG
50 TABLET ORAL DAILY
Refills: 0 | Status: DISCONTINUED | OUTPATIENT
Start: 2020-04-01 | End: 2020-04-09

## 2020-04-01 RX ORDER — LISINOPRIL 2.5 MG/1
20 TABLET ORAL DAILY
Refills: 0 | Status: DISCONTINUED | OUTPATIENT
Start: 2020-04-01 | End: 2020-04-01

## 2020-04-01 RX ORDER — HYDROXYCHLOROQUINE SULFATE 200 MG
200 TABLET ORAL EVERY 12 HOURS
Refills: 0 | Status: COMPLETED | OUTPATIENT
Start: 2020-04-02 | End: 2020-04-06

## 2020-04-01 RX ORDER — MAGNESIUM SULFATE 500 MG/ML
2 VIAL (ML) INJECTION ONCE
Refills: 0 | Status: COMPLETED | OUTPATIENT
Start: 2020-04-01 | End: 2020-04-01

## 2020-04-01 RX ADMIN — Medication 20 MILLIGRAM(S): at 18:54

## 2020-04-01 RX ADMIN — Medication 650 MILLIGRAM(S): at 13:30

## 2020-04-01 RX ADMIN — APIXABAN 5 MILLIGRAM(S): 2.5 TABLET, FILM COATED ORAL at 07:04

## 2020-04-01 RX ADMIN — LISINOPRIL 20 MILLIGRAM(S): 2.5 TABLET ORAL at 07:04

## 2020-04-01 RX ADMIN — ATORVASTATIN CALCIUM 40 MILLIGRAM(S): 80 TABLET, FILM COATED ORAL at 01:02

## 2020-04-01 RX ADMIN — Medication 20 MILLIGRAM(S): at 07:04

## 2020-04-01 RX ADMIN — Medication 400 MILLIGRAM(S): at 22:53

## 2020-04-01 RX ADMIN — AZITHROMYCIN 255 MILLIGRAM(S): 500 TABLET, FILM COATED ORAL at 02:00

## 2020-04-01 RX ADMIN — Medication 50 MILLIGRAM(S): at 07:05

## 2020-04-01 RX ADMIN — Medication 500 MILLIGRAM(S): at 12:21

## 2020-04-01 RX ADMIN — CEFTRIAXONE 100 MILLIGRAM(S): 500 INJECTION, POWDER, FOR SOLUTION INTRAMUSCULAR; INTRAVENOUS at 02:00

## 2020-04-01 RX ADMIN — ZINC SULFATE TAB 220 MG (50 MG ZINC EQUIVALENT) 220 MILLIGRAM(S): 220 (50 ZN) TAB at 12:21

## 2020-04-01 RX ADMIN — APIXABAN 5 MILLIGRAM(S): 2.5 TABLET, FILM COATED ORAL at 22:53

## 2020-04-01 RX ADMIN — Medication 50 GRAM(S): at 13:30

## 2020-04-01 NOTE — PROGRESS NOTE ADULT - ASSESSMENT
86M with CAD complicated by MI s/p CABG, alpha-thalassemia, HTN/HLD, who presents with SOB found to have acute hypoxemic respiratory failure 2/2 to COVID pna and fluid overload concern for acute on chronic chf of unknown eitology

## 2020-04-01 NOTE — PROGRESS NOTE ADULT - SUBJECTIVE AND OBJECTIVE BOX
Patient is a 86y old  Male who presents with a chief complaint of SOB, desats (2020 12:08)    Patient seen in follow up for hyponatremia.        PAST MEDICAL HISTORY:  Alpha thalassemia  HTN (hypertension)  Dyslipidemia  Myocardial infarction  CAD (coronary artery disease)    MEDICATIONS  (STANDING):  apixaban 5 milliGRAM(s) Oral two times a day  ascorbic acid 500 milliGRAM(s) Oral daily  atorvastatin 40 milliGRAM(s) Oral at bedtime  azithromycin  IVPB 500 milliGRAM(s) IV Intermittent every 24 hours  cefTRIAXone   IVPB 1000 milliGRAM(s) IV Intermittent every 24 hours  furosemide   Injectable 20 milliGRAM(s) IV Push daily  lisinopril 20 milliGRAM(s) Oral daily  metoprolol succinate ER 50 milliGRAM(s) Oral daily  predniSONE   Tablet 20 milliGRAM(s) Oral daily  zinc sulfate 220 milliGRAM(s) Oral daily    MEDICATIONS  (PRN):    T(C): 36.8 (20 @ 09:19), Max: 37.8 (20 @ 00:51)  HR: 103 (20 @ 09:19) (57 - 103)  BP: 158/70 (20 @ 09:19) (98/58 - 200/95)  RR: 19 (20 @ 09:19) (19 - 35)  SpO2: 95% (20 @ 09:19) (85% - 100%)  Wt(kg): --  I&O's Detail    31 Mar 2020 07:01  -  2020 07:00  --------------------------------------------------------  IN:  Total IN: 0 mL    OUT:    Voided: 302 mL  Total OUT: 302 mL    Total NET: -302 mL          PHYSICAL EXAM:  Pt on COVID precautions. Chart reviewed and previous physical examination noted.                           11.3   5.91  )-----------( 165      ( 31 Mar 2020 01:26 )             35.3     04-01    130<L>  |  93<L>  |  22  ----------------------------<  104<H>  4.1   |  25  |  1.38<H>    Ca    8.0<L>      2020 08:56  Phos  3.7       Mg     1.5         TPro  8.5<H>  /  Alb  2.9<L>  /  TBili  1.2  /  DBili  x   /  AST  62<H>  /  ALT  23  /  AlkPhos  105      CARDIAC MARKERS ( 31 Mar 2020 19:51 )  .126 ng/mL / x     / x     / x     / x      CARDIAC MARKERS ( 31 Mar 2020 01:26 )  .091 ng/mL / x     / 92 U/L / x     / 0.7 ng/mL      LIVER FUNCTIONS - ( 31 Mar 2020 01:26 )  Alb: 2.9 g/dL / Pro: 8.5 g/dL / ALK PHOS: 105 U/L / ALT: 23 U/L DA / AST: 62 U/L / GGT: x           Urinalysis Basic - ( 31 Mar 2020 01:55 )    Color: Yellow / Appearance: Clear / S.010 / pH: x  Gluc: x / Ketone: Negative  / Bili: Negative / Urobili: Negative mg/dL   Blood: x / Protein: 500 mg/dL / Nitrite: Negative   Leuk Esterase: Negative / RBC: 0-2 /HPF / WBC 0-2   Sq Epi: x / Non Sq Epi: Occasional / Bacteria: Occasional        Sodium, Serum: 130 ( @ 08:56)  Sodium, Serum: 130 ( @ 18:12)  Sodium, Serum: 123 ( @ :26)    Creatinine, Serum: 1.38 ( @ 08:56)  Creatinine, Serum: 1.42 ( @ 18:12)  Creatinine, Serum: 1.41 ( @ :26)    Potassium, Serum: 4.1 ( @ 08:56)  Potassium, Serum: 3.8 ( @ 18:12)  Potassium, Serum: 5.6 ( @ :26)    Hemoglobin: 11.3 (:26) Patient is a 86y old  Male who presents with a chief complaint of SOB, desats (2020 12:08)    Patient seen in follow up for hyponatremia.   COVID +       PAST MEDICAL HISTORY:  Alpha thalassemia  HTN (hypertension)  Dyslipidemia  Myocardial infarction  CAD (coronary artery disease)    MEDICATIONS  (STANDING):  apixaban 5 milliGRAM(s) Oral two times a day  ascorbic acid 500 milliGRAM(s) Oral daily  atorvastatin 40 milliGRAM(s) Oral at bedtime  azithromycin  IVPB 500 milliGRAM(s) IV Intermittent every 24 hours  cefTRIAXone   IVPB 1000 milliGRAM(s) IV Intermittent every 24 hours  furosemide   Injectable 20 milliGRAM(s) IV Push daily  lisinopril 20 milliGRAM(s) Oral daily  metoprolol succinate ER 50 milliGRAM(s) Oral daily  predniSONE   Tablet 20 milliGRAM(s) Oral daily  zinc sulfate 220 milliGRAM(s) Oral daily    MEDICATIONS  (PRN):    T(C): 36.8 (20 @ 09:19), Max: 37.8 (20 @ 00:51)  HR: 103 (20 @ 09:19) (57 - 103)  BP: 158/70 (20 @ 09:19) (98/58 - 200/95)  RR: 19 (20 @ 09:19) (19 - 35)  SpO2: 95% (20 @ 09:19) (85% - 100%)  Wt(kg): --  I&O's Detail    31 Mar 2020 07:01  -  2020 07:00  --------------------------------------------------------  IN:  Total IN: 0 mL    OUT:    Voided: 302 mL  Total OUT: 302 mL    Total NET: -302 mL          PHYSICAL EXAM:  Pt on COVID precautions. Chart reviewed and previous physical examination noted.                           11.3   5.91  )-----------( 165      ( 31 Mar 2020 01:26 )             35.3     04-01    130<L>  |  93<L>  |  22  ----------------------------<  104<H>  4.1   |  25  |  1.38<H>    Ca    8.0<L>      2020 08:56  Phos  3.7       Mg     1.5         TPro  8.5<H>  /  Alb  2.9<L>  /  TBili  1.2  /  DBili  x   /  AST  62<H>  /  ALT  23  /  AlkPhos  105      CARDIAC MARKERS ( 31 Mar 2020 19:51 )  .126 ng/mL / x     / x     / x     / x      CARDIAC MARKERS ( 31 Mar 2020 01:26 )  .091 ng/mL / x     / 92 U/L / x     / 0.7 ng/mL      LIVER FUNCTIONS - ( 31 Mar 2020 01:26 )  Alb: 2.9 g/dL / Pro: 8.5 g/dL / ALK PHOS: 105 U/L / ALT: 23 U/L DA / AST: 62 U/L / GGT: x           Urinalysis Basic - ( 31 Mar 2020 01:55 )    Color: Yellow / Appearance: Clear / S.010 / pH: x  Gluc: x / Ketone: Negative  / Bili: Negative / Urobili: Negative mg/dL   Blood: x / Protein: 500 mg/dL / Nitrite: Negative   Leuk Esterase: Negative / RBC: 0-2 /HPF / WBC 0-2   Sq Epi: x / Non Sq Epi: Occasional / Bacteria: Occasional        Sodium, Serum: 130 ( @ 08:56)  Sodium, Serum: 130 ( @ 18:12)  Sodium, Serum: 123 ( @ :26)    Creatinine, Serum: 1.38 ( @ 08:56)  Creatinine, Serum: 1.42 ( @ 18:12)  Creatinine, Serum: 1.41 ( @ :26)    Potassium, Serum: 4.1 ( @ 08:56)  Potassium, Serum: 3.8 ( @ 18:12)  Potassium, Serum: 5.6 ( @ :26)    Hemoglobin: 11.3 ( @ :26)

## 2020-04-01 NOTE — PROGRESS NOTE ADULT - ASSESSMENT
86M with CAD complicated by MI s/p CABG, alpha-thalassemia, HTN/HLD, who presents with SOB.    Found to have pneumonia on CXR, possibly 2/2 COVID.  May be in heart failure as well.  With electrolyte abnormalities such as hyponatremia, hyperkalemia.      Pneumonia  - admitted to medicine   - f/u COVID   - f/u COVID labs  - cont with ceftriaxone and azithromycin   - f/u cultures  - start vitamin C and zinc   - monitor O2 sat and supplement as needed    Heart failure?    - cardiology consult noted.  - already received lasix 80mg IV in ED  - monitor I/Os  - already on ACEi  - BP stable now    Electrolyte abnormalities - hyponatremia,   - could be a component from heart failure/renal disease  - free water restrict for now   - nephrology consult  - check urine and serum osms    CAD  - cont with atorvastatin and ASA    Afib  - cont with metoprolol  - cont with eliquis     Preventive measures  IMPROVE VTE Individual Risk Assessment          RISK                                                          Points  [  ] Previous VTE                                                 3  [  ] Thrombophilia                                              2  [  ] Lower limb paralysis                                    2        (unable to hold up >15 seconds)    [  ] Current Cancer                                             2         (within 6 months)  [  ] Immobilization > 24 hrs                              1  [  ] ICU/CCU stay > 24 hours                            1  [X] Age > 60                                                        1    IMPROVE VTE Score 1    - will be on eliquis already   will be transfer to [facility]. 86M with CAD complicated by MI s/p CABG, alpha-thalassemia, HTN/HLD, who presents with SOB.    Found to have pneumonia on CXR, possibly 2/2 COVID.  May be in heart failure as well.  With electrolyte abnormalities such as hyponatremia, hyperkalemia.      Pneumonia  - admitted to medicine   - f/u COVID   - f/u COVID labs  - cont with ceftriaxone and azithromycin   - f/u cultures  - start vitamin C and zinc   - monitor O2 sat and supplement as needed    Heart failure?    - cardiology consult noted.  - already received lasix 80mg IV in ED  - monitor I/Os  - already on ACEi  - BP stable now    Electrolyte abnormalities - hyponatremia,   - could be a component from heart failure/renal disease  - free water restrict for now   - nephrology consult  - check urine and serum osms    CAD  - cont with atorvastatin and ASA    Afib  - cont with metoprolol  - cont with eliquis     Preventive measures  IMPROVE VTE Individual Risk Assessment          RISK                                                          Points  [  ] Previous VTE                                                 3  [  ] Thrombophilia                                              2  [  ] Lower limb paralysis                                    2        (unable to hold up >15 seconds)    [  ] Current Cancer                                             2         (within 6 months)  [  ] Immobilization > 24 hrs                              1  [  ] ICU/CCU stay > 24 hours                            1  [X] Age > 60                                                        1    IMPROVE VTE Score 1    - will be on eliquis already   will be transfer to [facility].  Family requested him to be transfer to [facility].

## 2020-04-01 NOTE — PROGRESS NOTE ADULT - PROBLEM SELECTOR PLAN 3
unable to check 2d echo at this time given covid status  diurese with lasix 20mg iv daily on lasix 20mg daily at home  daily bmp  keep K>4 Mg >2  opt 2d echo  monitor ins and outs  hold aceI given ILYA  c/w toprol  also component of hypervolemic hyponatremia

## 2020-04-01 NOTE — PROGRESS NOTE ADULT - ASSESSMENT
Pt with acute respiratory insufficiency, possible CHF vs COVID pneumonia or both. Clinically stable.   Awaiting Covid testing.  Apparently family wants to bring pt to [facility]. Have ordered oximetry ra to see if it would be safe to transport without O2.

## 2020-04-01 NOTE — PROGRESS NOTE ADULT - ASSESSMENT
1.	Hyponatremia: ? Increased ADH state, R/o retention  2.	Dyspnea: Pneumonia, R/o COVID, ? CHF  3.	Hypertension    Stable sodium levels. On IV lasix. Will follow sodium trend. Maintain PO fluid restriction. Avoid hypotonic fluids.   Monitor BP closely. Monitor I & O. Check bladder scan if urinary complaints. Monitor BP trend. Titrate BP meds as needed. COVID pending  Will follow electrolytes and renal function trend. 1.	Hyponatremia: ? Increased ADH state, R/o retention  2.	Dyspnea: Pneumonia, COVID +, ? CHF  3.	Hypertension    Stable sodium levels. On IV lasix. Will follow sodium trend. Maintain PO fluid restriction. Avoid hypotonic fluids.   Monitor BP closely. Monitor I & O. Check bladder scan if urinary complaints. Monitor BP trend. Titrate BP meds as needed. COVID positive.   COVID precautions. Will follow electrolytes and renal function trend.

## 2020-04-01 NOTE — PROGRESS NOTE ADULT - SUBJECTIVE AND OBJECTIVE BOX
Patient is a 86y old  Male who presents with a chief complaint of SOB, desats (2020 09:00)      INTERVAL HPI/OVERNIGHT EVENTS:  Pt is seen and examined.  feeling ok.  on oxygen.    Pain Location & Control:     MEDICATIONS  (STANDING):  apixaban 5 milliGRAM(s) Oral two times a day  ascorbic acid 500 milliGRAM(s) Oral daily  atorvastatin 40 milliGRAM(s) Oral at bedtime  azithromycin  IVPB 500 milliGRAM(s) IV Intermittent every 24 hours  cefTRIAXone   IVPB 1000 milliGRAM(s) IV Intermittent every 24 hours  furosemide   Injectable 20 milliGRAM(s) IV Push daily  lisinopril 20 milliGRAM(s) Oral daily  metoprolol succinate ER 50 milliGRAM(s) Oral daily  predniSONE   Tablet 20 milliGRAM(s) Oral daily  zinc sulfate 220 milliGRAM(s) Oral daily    MEDICATIONS  (PRN):      Allergies    No Known Allergies    Intolerances      Vital Signs Last 24 Hrs  T(C): 36.8 (2020 09:19), Max: 37.6 (31 Mar 2020 22:00)  T(F): 98.3 (2020 09:19), Max: 99.6 (31 Mar 2020 22:00)  HR: 103 (2020 09:19) (77 - 103)  BP: 158/70 (2020 09:19) (98/58 - 158/70)  BP(mean): --  RR: 19 (2020 09:19) (19 - 24)  SpO2: 95% (2020 09:19) (90% - 99%)        LABS:    2020 08:56    130    |  93     |  22     ----------------------------<  104    4.1     |  25     |  1.38     Ca    8.0        2020 08:56  Phos  3.7       2020 08:56  Mg     1.5       2020 08:56        Urinalysis Basic - ( 31 Mar 2020 01:55 )    Color: Yellow / Appearance: Clear / S.010 / pH: x  Gluc: x / Ketone: Negative  / Bili: Negative / Urobili: Negative mg/dL   Blood: x / Protein: 500 mg/dL / Nitrite: Negative   Leuk Esterase: Negative / RBC: 0-2 /HPF / WBC 0-2   Sq Epi: x / Non Sq Epi: Occasional / Bacteria: Occasional      CAPILLARY BLOOD GLUCOSE        CARDIAC MARKERS ( 31 Mar 2020 19:51 )  .126 ng/mL / x     / x     / x     / x      CARDIAC MARKERS ( 31 Mar 2020 01:26 )  .091 ng/mL / x     / 92 U/L / x     / 0.7 ng/mL      Cultures          RADIOLOGY & ADDITIONAL TESTS:    Imaging Personally Reviewed:  [ ] YES  [ ] NO    Consultant(s) Notes Reviewed:  [ ] YES  [ ] NO    Care Discussed with Consultants/Other Providers [ x] YES  [ ] NO

## 2020-04-01 NOTE — PROGRESS NOTE ADULT - PROBLEM SELECTOR PLAN 1
2/2 to viral PNA from COVID 19+  start hydroxycholorquine  wean o2  currently on 5L NC  also component of fluid overload, plan as below

## 2020-04-01 NOTE — PROGRESS NOTE ADULT - SUBJECTIVE AND OBJECTIVE BOX
[facility] Division of Hospital Medicine  Benny Basurto DO  Pager (M-F, 8A-5P): [phone]  Other Times:  [phone]    Patient is a 86y old  Male who presents with a chief complaint of SOB, desats (2020 12:27)      SUBJECTIVE / OVERNIGHT EVENTS:      MEDICATIONS  (STANDING):  apixaban 5 milliGRAM(s) Oral two times a day  furosemide   Injectable 20 milliGRAM(s) IV Push daily  hydroxychloroquine   Oral   lisinopril 20 milliGRAM(s) Oral daily  metoprolol succinate ER 50 milliGRAM(s) Oral daily    MEDICATIONS  (PRN):      CAPILLARY BLOOD GLUCOSE        I&O's Summary    31 Mar 2020 07:01  -  2020 07:00  --------------------------------------------------------  IN: 0 mL / OUT: 302 mL / NET: -302 mL        PHYSICAL EXAM:  Vital Signs Last 24 Hrs  T(C): 36.9 (2020 16:51), Max: 37.6 (31 Mar 2020 22:00)  T(F): 98.4 (2020 16:51), Max: 99.6 (31 Mar 2020 22:00)  HR: 80 (2020 16:51) (77 - 103)  BP: 101/59 (2020 15:19) (98/58 - 158/70)  BP(mean): --  RR: 20 (2020 16:51) (18 - 23)  SpO2: 95% (2020 16:51) (94% - 99%)    LABS:                        11.3   5.91  )-----------( 165      ( 31 Mar 2020 01:26 )             35.3     04-01    130<L>  |  93<L>  |  22  ----------------------------<  104<H>  4.1   |  25  |  1.38<H>    Ca    8.0<L>      2020 08:56  Phos  3.7     04-01  Mg     1.5     04-01    TPro  8.5<H>  /  Alb  2.9<L>  /  TBili  1.2  /  DBili  x   /  AST  62<H>  /  ALT  23  /  AlkPhos  105  03-31      CARDIAC MARKERS ( 31 Mar 2020 19:51 )  .126 ng/mL / x     / x     / x     / x      CARDIAC MARKERS ( 31 Mar 2020 01:26 )  .091 ng/mL / x     / 92 U/L / x     / 0.7 ng/mL      Urinalysis Basic - ( 31 Mar 2020 01:55 )    Color: Yellow / Appearance: Clear / S.010 / pH: x  Gluc: x / Ketone: Negative  / Bili: Negative / Urobili: Negative mg/dL   Blood: x / Protein: 500 mg/dL / Nitrite: Negative   Leuk Esterase: Negative / RBC: 0-2 /HPF / WBC 0-2   Sq Epi: x / Non Sq Epi: Occasional / Bacteria: Occasional          RADIOLOGY & ADDITIONAL TESTS:  Results Reviewed:   Imaging Personally Reviewed:  Electrocardiogram Personally Reviewed:    COORDINATION OF CARE:  Care Discussed with Consultants/Other Providers [Y/N]:  Prior or Outpatient Records Reviewed [Y/N]: [facility] Division of Hospital Medicine  Benny Basurto DO  Pager (DARCIF, 8A-5P): [phone]  Other Times:  [phone]    Patient is a 86y old  Male who presents with a chief complaint of SOB, desats (2020 12:27)      SUBJECTIVE / OVERNIGHT EVENTS:    feels ok.  mildly sob no cough    5L 94%      MEDICATIONS  (STANDING):  apixaban 5 milliGRAM(s) Oral two times a day  furosemide   Injectable 20 milliGRAM(s) IV Push daily  hydroxychloroquine   Oral   lisinopril 20 milliGRAM(s) Oral daily  metoprolol succinate ER 50 milliGRAM(s) Oral daily    MEDICATIONS  (PRN):      CAPILLARY BLOOD GLUCOSE        I&O's Summary    31 Mar 2020 07:01  -  2020 07:00  --------------------------------------------------------  IN: 0 mL / OUT: 302 mL / NET: -302 mL        PHYSICAL EXAM:  Vital Signs Last 24 Hrs  T(C): 36.9 (2020 16:51), Max: 37.6 (31 Mar 2020 22:00)  T(F): 98.4 (2020 16:51), Max: 99.6 (31 Mar 2020 22:00)  HR: 80 (2020 16:51) (77 - 103)  BP: 101/59 (2020 15:19) (98/58 - 158/70)  BP(mean): --  RR: 20 (2020 16:51) (18 - 23)  SpO2: 95% (2020 16:51) (94% - 99%)    LABS:                        11.3   5.91  )-----------( 165      ( 31 Mar 2020 01:26 )             35.3     04-01    130<L>  |  93<L>  |  22  ----------------------------<  104<H>  4.1   |  25  |  1.38<H>    Ca    8.0<L>      2020 08:56  Phos  3.7     04-01  Mg     1.5         TPro  8.5<H>  /  Alb  2.9<L>  /  TBili  1.2  /  DBili  x   /  AST  62<H>  /  ALT  23  /  AlkPhos  105        CARDIAC MARKERS ( 31 Mar 2020 19:51 )  .126 ng/mL / x     / x     / x     / x      CARDIAC MARKERS ( 31 Mar 2020 01:26 )  .091 ng/mL / x     / 92 U/L / x     / 0.7 ng/mL      Urinalysis Basic - ( 31 Mar 2020 01:55 )    Color: Yellow / Appearance: Clear / S.010 / pH: x  Gluc: x / Ketone: Negative  / Bili: Negative / Urobili: Negative mg/dL   Blood: x / Protein: 500 mg/dL / Nitrite: Negative   Leuk Esterase: Negative / RBC: 0-2 /HPF / WBC 0-2   Sq Epi: x / Non Sq Epi: Occasional / Bacteria: Occasional          RADIOLOGY & ADDITIONAL TESTS:  Results Reviewed:   Imaging Personally Reviewed:  Electrocardiogram Personally Reviewed:    COORDINATION OF CARE:  Care Discussed with Consultants/Other Providers [Y/N]:  Prior or Outpatient Records Reviewed [Y/N]:

## 2020-04-01 NOTE — PROGRESS NOTE ADULT - SUBJECTIVE AND OBJECTIVE BOX
PULMONARY/CRITICAL CARE    Less sob. No fever.     Patient is a 86y old  Male who presents with a chief complaint of SOB, desats (31 Mar 2020 10:33)  Possible CHF vs COVID PN.    BRIEF HOSPITAL COURSE: ***  86y old  Male who presents with a chief complaint of SOB (31 Mar 2020 08:19)    HPI:      86M with CAD complicated by MI s/p CABG, alpha-thalassemia, HTN/HLD, who presents with SOB.  Per notes, family say patient has been having worsening BLE edema, abdominal distension, and SOB over the past couple of months.  He was then recently started on lasix 20mg every other day.  Patient has been diuresing well but his BP was elevated and his lisinopril was increased from 10 to 20mg.  Last night, patient started have an acute episode of SOB with desats.  Patient was brought here where he was found to be labored with SBP in the 200s.  Patient was given 2 SL NG and lasix 80mg IV x1.  Patient's BP eventually dropped and his breathing improved. (31 Mar 2020 04:58)    Renal consult called for hyponatremia. History obtained from chart.     Events last 24 hours: ***    PAST MEDICAL & SURGICAL HISTORY:  Alpha thalassemia  HTN (hypertension)  Dyslipidemia  Myocardial infarction:   CAD (coronary artery disease)  S/P CAB in [facility]    Allergies    No Known Allergies    Intolerances      FAMILY HISTORY:      Review of Systems:  unobtainable      Medications:  azithromycin  IVPB 500 milliGRAM(s) IV Intermittent every 24 hours  cefTRIAXone   IVPB 1000 milliGRAM(s) IV Intermittent every 24 hours    furosemide   Injectable 20 milliGRAM(s) IV Push daily  lisinopril 20 milliGRAM(s) Oral daily  metoprolol succinate ER 50 milliGRAM(s) Oral daily          apixaban 5 milliGRAM(s) Oral two times a day        atorvastatin 40 milliGRAM(s) Oral at bedtime    ascorbic acid 500 milliGRAM(s) Oral daily  sodium chloride 1 Gram(s) Oral every 6 hours  zinc sulfate 220 milliGRAM(s) Oral daily                ICU Vital Signs Last 24 Hrs  T(C): 36.5 (31 Mar 2020 07:40), Max: 37.8 (31 Mar 2020 00:51)  T(F): 97.7 (31 Mar 2020 07:40), Max: 100 (31 Mar 2020 00:51)  HR: 76 (31 Mar 2020 07:40) (57 - 102)  BP: 142/63 (31 Mar 2020 07:40) (124/56 - 200/95)  BP(mean): --  ABP: --  ABP(mean): --  RR: 21 (31 Mar 2020 07:40) (20 - 35)  SpO2: 94% (31 Mar 2020 07:40) (85% - 100%)    Vital Signs Last 24 Hrs  T(C): 36.5 (31 Mar 2020 07:40), Max: 37.8 (31 Mar 2020 00:51)  T(F): 97.7 (31 Mar 2020 07:40), Max: 100 (31 Mar 2020 00:51)  HR: 76 (31 Mar 2020 07:40) (57 - 102)  BP: 142/63 (31 Mar 2020 07:40) (124/56 - 200/95)  BP(mean): --  RR: 21 (31 Mar 2020 07:40) (20 - 35)  SpO2: 94% (31 Mar 2020 07:40) (85% - 100%)        I&O's Detail    30 Mar 2020 07:01  -  31 Mar 2020 07:00  --------------------------------------------------------  IN:  Total IN: 0 mL    OUT:    Voided: 600 mL  Total OUT: 600 mL    Total NET: -600 mL            LABS:                        11.3   5.91  )-----------( 165      ( 31 Mar 2020 01:26 )             35.3     0331    123<L>  |  92<L>  |  21  ----------------------------<  167<H>  5.6<H>   |  21<L>  |  1.41<H>    Ca    8.4      31 Mar 2020 01:  Mg     1.6         TPro  8.5<H>  /  Alb  2.9<L>  /  TBili  1.2  /  DBili  x   /  AST  62<H>  /  ALT  23  /  AlkPhos  105        CARDIAC MARKERS ( 31 Mar 2020 01:26 )  .091 ng/mL / x     / 92 U/L / x     / 0.7 ng/mL      CAPILLARY BLOOD GLUCOSE          Urinalysis Basic - ( 31 Mar 2020 01:55 )    Color: Yellow / Appearance: Clear / S.010 / pH: x  Gluc: x / Ketone: Negative  / Bili: Negative / Urobili: Negative mg/dL   Blood: x / Protein: 500 mg/dL / Nitrite: Negative   Leuk Esterase: Negative / RBC: 0-2 /HPF / WBC 0-2   Sq Epi: x / Non Sq Epi: Occasional / Bacteria: Occasional      CULTURES:      Physical Examination:    General: No acute distress.  elderly male on nasal O2--sat 98    HEENT: Pupils equal, reactive to light.  Symmetric.    PULM: decreased bs bas crackles.    CVS: Regular rate and rhythm, no murmurs, rubs, or gallops    ABD: Soft, nondistended, nontender, normoactive bowel sounds, no masses    EXT: No edema, nontender    SKIN: Warm and well perfused, no rashes noted.    NEURO: Alert, nteractive, nonfocal  < from: Xray Chest 1 View- PORTABLE-Urgent (20 @ 01:51) >  EXAM:  XR CHEST PORTABLE URGENT 1V                                  PROCEDURE DATE:  2020          INTERPRETATION:  CLINICAL INFORMATION: Shortness of breath.    TECHNIQUE: AP portable view of the chest    COMPARISON:     FINDINGS:    There is stable enlargement of the cardiac silhouette. The aorta is tortuous and atherosclerotic. Patient is status post median sternotomy and CABG. There are extensive bilateral airspace opacities predominantly within the mid to lower lung fields. There is no large effusion or pneumothorax.    IMPRESSION:     Extensive bilateral airspace opacities most suspicious for pneumonia, including viral infection. Superimposed edema is not excluded. Findings were discussed with Dr. Lezama at 1:49 AM on3/.                  ANICETO BARTON M.D., ATTENDING RADIOLOGIST  This document has been electronically signed. Mar 31 2020  1:51AM        < end of copied text >    RADIOLOGY: ***< from: Xray Chest 1 View- PORTABLE-Routine (20 @ 06:50) >    EXAM:  XR CHEST PORTABLE ROUTINE 1V                                  PROCEDURE DATE:  2020          INTERPRETATION:  Chest portable.    Clinical History: Fever, pneumonia follow-up exam.    Comparison: 3/31/2020.    Single AP view submitted.    The evaluation of the cardiomediastinal silhouette is limited on portable technique.  Sternotomy. Prominent cardiac silhouette.  Aortic calcification.    Again noted are coarse reticular nodular interstitial markings bilaterally, with more confluent airspace consolidation in the periphery of the left mid to upper lung zone.    No significant pleural effusion is noted.    Impression:    Findings as discussed above.        < end of copied text >      CRITICAL CARE TIME SPENT: ***

## 2020-04-02 LAB
ALBUMIN SERPL ELPH-MCNC: 3 G/DL — LOW (ref 3.3–5)
ALP SERPL-CCNC: 78 U/L — SIGNIFICANT CHANGE UP (ref 40–120)
ALT FLD-CCNC: 16 U/L — SIGNIFICANT CHANGE UP (ref 10–45)
ANION GAP SERPL CALC-SCNC: 13 MMOL/L — SIGNIFICANT CHANGE UP (ref 5–17)
AST SERPL-CCNC: 41 U/L — HIGH (ref 10–40)
BASOPHILS # BLD AUTO: 0.05 K/UL — SIGNIFICANT CHANGE UP (ref 0–0.2)
BASOPHILS NFR BLD AUTO: 0.9 % — SIGNIFICANT CHANGE UP (ref 0–2)
BILIRUB SERPL-MCNC: 1 MG/DL — SIGNIFICANT CHANGE UP (ref 0.2–1.2)
BUN SERPL-MCNC: 26 MG/DL — HIGH (ref 7–23)
CALCIUM SERPL-MCNC: 8.5 MG/DL — SIGNIFICANT CHANGE UP (ref 8.4–10.5)
CHLORIDE SERPL-SCNC: 90 MMOL/L — LOW (ref 96–108)
CK SERPL-CCNC: 178 U/L — SIGNIFICANT CHANGE UP (ref 30–200)
CO2 SERPL-SCNC: 27 MMOL/L — SIGNIFICANT CHANGE UP (ref 22–31)
CREAT SERPL-MCNC: 1.3 MG/DL — SIGNIFICANT CHANGE UP (ref 0.5–1.3)
CRP SERPL-MCNC: 16.23 MG/DL — HIGH (ref 0–0.4)
D DIMER BLD IA.RAPID-MCNC: 510 NG/ML DDU — HIGH
EOSINOPHIL # BLD AUTO: 0 K/UL — SIGNIFICANT CHANGE UP (ref 0–0.5)
EOSINOPHIL NFR BLD AUTO: 0 % — SIGNIFICANT CHANGE UP (ref 0–6)
FERRITIN SERPL-MCNC: 685 NG/ML — HIGH (ref 30–400)
GLUCOSE BLDC GLUCOMTR-MCNC: 141 MG/DL — HIGH (ref 70–99)
GLUCOSE SERPL-MCNC: 146 MG/DL — HIGH (ref 70–99)
HCT VFR BLD CALC: 30.7 % — LOW (ref 39–50)
HGB BLD-MCNC: 9.6 G/DL — LOW (ref 13–17)
LACTATE SERPL-SCNC: 1.8 MMOL/L — SIGNIFICANT CHANGE UP (ref 0.7–2)
LDH SERPL L TO P-CCNC: 346 U/L — HIGH (ref 50–242)
LYMPHOCYTES # BLD AUTO: 0.26 K/UL — LOW (ref 1–3.3)
LYMPHOCYTES # BLD AUTO: 5.2 % — LOW (ref 13–44)
MAGNESIUM SERPL-MCNC: 2.2 MG/DL — SIGNIFICANT CHANGE UP (ref 1.6–2.6)
MCHC RBC-ENTMCNC: 20.7 PG — LOW (ref 27–34)
MCHC RBC-ENTMCNC: 31.3 GM/DL — LOW (ref 32–36)
MCV RBC AUTO: 66.3 FL — LOW (ref 80–100)
MONOCYTES # BLD AUTO: 0.31 K/UL — SIGNIFICANT CHANGE UP (ref 0–0.9)
MONOCYTES NFR BLD AUTO: 6.1 % — SIGNIFICANT CHANGE UP (ref 2–14)
NEUTROPHILS # BLD AUTO: 4.47 K/UL — SIGNIFICANT CHANGE UP (ref 1.8–7.4)
NEUTROPHILS NFR BLD AUTO: 87.8 % — HIGH (ref 43–77)
PHOSPHATE SERPL-MCNC: 2.5 MG/DL — SIGNIFICANT CHANGE UP (ref 2.5–4.5)
PLATELET # BLD AUTO: 175 K/UL — SIGNIFICANT CHANGE UP (ref 150–400)
POTASSIUM SERPL-MCNC: 3.6 MMOL/L — SIGNIFICANT CHANGE UP (ref 3.5–5.3)
POTASSIUM SERPL-SCNC: 3.6 MMOL/L — SIGNIFICANT CHANGE UP (ref 3.5–5.3)
PROCALCITONIN SERPL-MCNC: 0.28 NG/ML — HIGH (ref 0.02–0.1)
PROT SERPL-MCNC: 7.3 G/DL — SIGNIFICANT CHANGE UP (ref 6–8.3)
RBC # BLD: 4.63 M/UL — SIGNIFICANT CHANGE UP (ref 4.2–5.8)
RBC # FLD: 15.6 % — HIGH (ref 10.3–14.5)
SODIUM SERPL-SCNC: 130 MMOL/L — LOW (ref 135–145)
TROPONIN T, HIGH SENSITIVITY RESULT: 58 NG/L — HIGH (ref 0–51)
WBC # BLD: 5.09 K/UL — SIGNIFICANT CHANGE UP (ref 3.8–10.5)
WBC # FLD AUTO: 5.09 K/UL — SIGNIFICANT CHANGE UP (ref 3.8–10.5)

## 2020-04-02 PROCEDURE — 99239 HOSP IP/OBS DSCHRG MGMT >30: CPT

## 2020-04-02 RX ORDER — ACETAMINOPHEN 500 MG
650 TABLET ORAL EVERY 4 HOURS
Refills: 0 | Status: DISCONTINUED | OUTPATIENT
Start: 2020-04-02 | End: 2020-04-09

## 2020-04-02 RX ADMIN — Medication 400 MILLIGRAM(S): at 10:08

## 2020-04-02 RX ADMIN — Medication 50 MILLIGRAM(S): at 06:06

## 2020-04-02 RX ADMIN — APIXABAN 5 MILLIGRAM(S): 2.5 TABLET, FILM COATED ORAL at 22:04

## 2020-04-02 RX ADMIN — Medication 200 MILLIGRAM(S): at 22:04

## 2020-04-02 RX ADMIN — APIXABAN 5 MILLIGRAM(S): 2.5 TABLET, FILM COATED ORAL at 10:08

## 2020-04-02 RX ADMIN — Medication 20 MILLIGRAM(S): at 06:06

## 2020-04-02 NOTE — PROGRESS NOTE ADULT - PROBLEM SELECTOR PLAN 3
unable to check 2d echo at this time given covid status  s/p diuresis w IV lasix, will now DC Lasix  daily bmp  keep K>4 Mg >2  opt 2d echo  monitor ins and outs  hold aceI given ILYA  c/w toprol  also component of hypervolemic hyponatremia

## 2020-04-02 NOTE — PROGRESS NOTE ADULT - PROBLEM SELECTOR PLAN 1
2/2 to viral PNA from COVID 19+  cont hydroxycholorquine  start anakinra, IV steroids, ID and pulm consults called  cont o2NC supplementation  currently on 5L NC  also component of fluid overload on admission, which has resolved, will DC IV lasix 2/2 to viral PNA from COVID 19+  cont hydroxycholorquine, stable on nasal canula, if condition worsens would start anakinra, IV steroids,   ID and pulm consults called  cont o2NC supplementation  currently on 5L NC  also component of fluid overload on admission, which has resolved, will DC IV lasix

## 2020-04-02 NOTE — PROGRESS NOTE ADULT - SUBJECTIVE AND OBJECTIVE BOX
Patient is a 86y old  Male who presents with a chief complaint of SOB, desats (01 Apr 2020 18:17)      SUBJECTIVE / OVERNIGHT EVENTS: ptn is on nasal canula, denies dyspnea diarrhea resolved    MEDICATIONS  (STANDING):  apixaban 5 milliGRAM(s) Oral two times a day  furosemide   Injectable 20 milliGRAM(s) IV Push daily  hydroxychloroquine   Oral   hydroxychloroquine 200 milliGRAM(s) Oral every 12 hours  metoprolol succinate ER 50 milliGRAM(s) Oral daily    MEDICATIONS  (PRN):  acetaminophen   Tablet .. 650 milliGRAM(s) Oral every 4 hours PRN Temp greater or equal to 38.5C (101.3F)  acetaminophen  Suppository .. 650 milliGRAM(s) Rectal every 4 hours PRN Temp greater or equal to 38.5C (101.3F)      Vital Signs Last 24 Hrs  T(F): 100.4 (04-02-20 @ 21:25), Max: 100.4 (04-02-20 @ 21:25)  HR: 89 (04-02-20 @ 21:25) (79 - 94)  BP: 166/71 (04-02-20 @ 21:25) (109/65 - 171/56)  RR: 20 (04-02-20 @ 21:25) (20 - 20)  SpO2: 95% (04-02-20 @ 21:25) (95% - 99%)  Telemetry:   CAPILLARY BLOOD GLUCOSE      POCT Blood Glucose.: 141 mg/dL (02 Apr 2020 11:43)    I&O's Summary    01 Apr 2020 07:01  -  02 Apr 2020 07:00  --------------------------------------------------------  IN: 0 mL / OUT: 100 mL / NET: -100 mL        PHYSICAL EXAM:  GENERAL: NAD, well-developed  HEAD:  Atraumatic, Normocephalic  EYES: EOMI, PERRLA, conjunctiva and sclera clear  NECK: Supple, No JVD  CHEST/LUNG: Clear to auscultation bilaterally; No wheeze  HEART: Regular rate and rhythm; No murmurs, rubs, or gallops  ABDOMEN: Soft, Nontender, Nondistended; Bowel sounds present  EXTREMITIES:  2+ Peripheral Pulses, No clubbing, cyanosis, or edema  PSYCH: AAOx3  NEUROLOGY: non-focal  SKIN: No rashes or lesions    LABS:                        9.6    5.09  )-----------( 175      ( 02 Apr 2020 11:47 )             30.7     04-02    130<L>  |  90<L>  |  26<H>  ----------------------------<  146<H>  3.6   |  27  |  1.30    Ca    8.5      02 Apr 2020 11:47  Phos  2.5     04-02  Mg     2.2     04-02    TPro  7.3  /  Alb  3.0<L>  /  TBili  1.0  /  DBili  x   /  AST  41<H>  /  ALT  16  /  AlkPhos  78  04-02      CARDIAC MARKERS ( 02 Apr 2020 11:47 )  x     / x     / 178 U/L / x     / x              RADIOLOGY & ADDITIONAL TESTS:    Imaging Personally Reviewed:    Consultant(s) Notes Reviewed:      Care Discussed with Consultants/Other Providers:

## 2020-04-03 LAB
ALBUMIN SERPL ELPH-MCNC: 3 G/DL — LOW (ref 3.3–5)
ALP SERPL-CCNC: 84 U/L — SIGNIFICANT CHANGE UP (ref 40–120)
ALT FLD-CCNC: 16 U/L — SIGNIFICANT CHANGE UP (ref 10–45)
ANION GAP SERPL CALC-SCNC: 13 MMOL/L — SIGNIFICANT CHANGE UP (ref 5–17)
AST SERPL-CCNC: 47 U/L — HIGH (ref 10–40)
BASOPHILS # BLD AUTO: 0.02 K/UL — SIGNIFICANT CHANGE UP (ref 0–0.2)
BASOPHILS NFR BLD AUTO: 0.4 % — SIGNIFICANT CHANGE UP (ref 0–2)
BILIRUB SERPL-MCNC: 1.1 MG/DL — SIGNIFICANT CHANGE UP (ref 0.2–1.2)
BUN SERPL-MCNC: 28 MG/DL — HIGH (ref 7–23)
CALCIUM SERPL-MCNC: 8.9 MG/DL — SIGNIFICANT CHANGE UP (ref 8.4–10.5)
CHLORIDE SERPL-SCNC: 92 MMOL/L — LOW (ref 96–108)
CO2 SERPL-SCNC: 26 MMOL/L — SIGNIFICANT CHANGE UP (ref 22–31)
CREAT SERPL-MCNC: 1.15 MG/DL — SIGNIFICANT CHANGE UP (ref 0.5–1.3)
EOSINOPHIL # BLD AUTO: 0.02 K/UL — SIGNIFICANT CHANGE UP (ref 0–0.5)
EOSINOPHIL NFR BLD AUTO: 0.4 % — SIGNIFICANT CHANGE UP (ref 0–6)
GLUCOSE SERPL-MCNC: 100 MG/DL — HIGH (ref 70–99)
HCT VFR BLD CALC: 33.5 % — LOW (ref 39–50)
HGB BLD-MCNC: 10.4 G/DL — LOW (ref 13–17)
IMM GRANULOCYTES NFR BLD AUTO: 0.7 % — SIGNIFICANT CHANGE UP (ref 0–1.5)
LYMPHOCYTES # BLD AUTO: 0.44 K/UL — LOW (ref 1–3.3)
LYMPHOCYTES # BLD AUTO: 8.1 % — LOW (ref 13–44)
MCHC RBC-ENTMCNC: 20.6 PG — LOW (ref 27–34)
MCHC RBC-ENTMCNC: 31 GM/DL — LOW (ref 32–36)
MCV RBC AUTO: 66.5 FL — LOW (ref 80–100)
MONOCYTES # BLD AUTO: 0.46 K/UL — SIGNIFICANT CHANGE UP (ref 0–0.9)
MONOCYTES NFR BLD AUTO: 8.5 % — SIGNIFICANT CHANGE UP (ref 2–14)
NEUTROPHILS # BLD AUTO: 4.44 K/UL — SIGNIFICANT CHANGE UP (ref 1.8–7.4)
NEUTROPHILS NFR BLD AUTO: 81.9 % — HIGH (ref 43–77)
NRBC # BLD: 0 /100 WBCS — SIGNIFICANT CHANGE UP (ref 0–0)
PLATELET # BLD AUTO: 174 K/UL — SIGNIFICANT CHANGE UP (ref 150–400)
POTASSIUM SERPL-MCNC: 3.6 MMOL/L — SIGNIFICANT CHANGE UP (ref 3.5–5.3)
POTASSIUM SERPL-SCNC: 3.6 MMOL/L — SIGNIFICANT CHANGE UP (ref 3.5–5.3)
PROT SERPL-MCNC: 7.2 G/DL — SIGNIFICANT CHANGE UP (ref 6–8.3)
RBC # BLD: 5.04 M/UL — SIGNIFICANT CHANGE UP (ref 4.2–5.8)
RBC # FLD: 15.4 % — HIGH (ref 10.3–14.5)
SODIUM SERPL-SCNC: 131 MMOL/L — LOW (ref 135–145)
WBC # BLD: 5.42 K/UL — SIGNIFICANT CHANGE UP (ref 3.8–10.5)
WBC # FLD AUTO: 5.42 K/UL — SIGNIFICANT CHANGE UP (ref 3.8–10.5)

## 2020-04-03 PROCEDURE — 99232 SBSQ HOSP IP/OBS MODERATE 35: CPT

## 2020-04-03 RX ORDER — PANTOPRAZOLE SODIUM 20 MG/1
40 TABLET, DELAYED RELEASE ORAL
Refills: 0 | Status: DISCONTINUED | OUTPATIENT
Start: 2020-04-03 | End: 2020-04-09

## 2020-04-03 RX ADMIN — Medication 50 MILLIGRAM(S): at 05:36

## 2020-04-03 RX ADMIN — Medication 200 MILLIGRAM(S): at 17:19

## 2020-04-03 RX ADMIN — APIXABAN 5 MILLIGRAM(S): 2.5 TABLET, FILM COATED ORAL at 20:44

## 2020-04-03 RX ADMIN — Medication 200 MILLIGRAM(S): at 05:09

## 2020-04-03 RX ADMIN — APIXABAN 5 MILLIGRAM(S): 2.5 TABLET, FILM COATED ORAL at 11:00

## 2020-04-03 NOTE — PROGRESS NOTE ADULT - ASSESSMENT
86M with CAD complicated by MI s/p CABG, alpha-thalassemia, HTN/HLD, who presents with SOB found to have acute hypoxemic respiratory failure 2/2 to COVID and fluid overload concern for acute on chronic chf of unknown etiology    Acute hypoxemic respiratory failure.  Plan: 2/2 to viral PNA from COVID 19+, markers slowly increasing.  cont hydroxychloroquine, stable on nasal cannula  ID and pulm consults called  cont o2NC supplementation  currently on 5L NC  also component of fluid overload on admission, which has resolved  -f/u strongyloides serology if pt to require steroids    #Acute on chronic heart failure, unspecified heart failure type   off IV lasix  opt 2d echo  monitor ins and outs  hold aceI given ILYA  c/w toprol  also component of hypervolemic hyponatremia    #HTN (hypertension).  Plan: c/w toprol xl  hold aceI given recent ilya on ckd stage III; BUN elevated. likely resume soon    #Atrial fibrillation, unspecified type.  Plan: cont outpt elquis at 5 mg bid.   PPI    DVT ppx- systemic AC

## 2020-04-03 NOTE — CONSULT NOTE ADULT - ASSESSMENT
COVID 19 viral infection  ACute hypoxemic respiratory failure  HfPEf acute on chronic with mod MR and mild AS: post diuretics  Pulmonary fibrosis - patient unaware at this time  Clearing of more fluffy opacities post diuresis with diffuse reticular/ hzay opacities: likely reflect to large degree chronic IPF      REC    Continue plaquenil  No clear indication for IL1/IL6 inhibitors or steroids at this time  Monitor sats and continue nasal Oxygen

## 2020-04-03 NOTE — CONSULT NOTE ADULT - SUBJECTIVE AND OBJECTIVE BOX
PULMONARY CONSULT  Domingo Reid MD  [phone]    Initial HPI on admission:  HPI:  86M with CAD complicated by MI s/p CABG, alpha-thalassemia, HTN/HLD, who presents with SOB.  Per notes, family say patient has been having worsening BLE edema, abdominal distension, and SOB over the past couple of months.  He was then recently started on lasix 20mg every other day.  Patient has been diuresing well but his BP was elevated and his lisinopril was increased from 10 to 20mg.  Last night, patient started have an acute episode of SOB with desats.  Patient was brought here where he was found to be labored with SBP in the 200s.  Patient was given 2 SL NG and lasix 80mg IV x1.  Patient's BP eventually dropped and his breathing improved.     Poor historian, unable to provide adequate PMH  CT  with emphysema and mid to basilar predomoniant pulmonary fibrosis with honeycombing, retic opacities, and GG. Patient denies knowledge or treatment of fibrosis. He admits to remote smoking history, not current  Admitted with fluid overload in setting of preserved LVF and mod MR/mild AS s/p treatment with diuretics and partial clearing of opacities on CXR  f/u CXR with reticular opacities c/w know IPF  Patient feels better today: sat 96% on 3 liters nasal oxygen    PAST MEDICAL & SURGICAL HISTORY:  Alpha thalassemia  HTN (hypertension)  Dyslipidemia  Myocardial infarction:   CAD (coronary artery disease)  S/P CAB in [facility]    Allergies    No Known Allergies    FAMILY HISTORY:    SOCIAL HISTORY:   Alcohol: Denied  Smoking: Nonsmoker  Drug Use: Denied  Marital Status:   REVIEW OF SYSTEMS:  CONSTITUTIONAL: + weaknes  Eyes: No visual changes  NECK: No pain or stiffness  RESPIRATORY: No cough, wheezing, hemoptysis; + shortness of breath  CARDIOVASCULAR: No chest pain or palpitations  GASTROINTESTINAL: + Loose stool.  GENITOURINARY: No dysuria, frequency or hematuria  NEUROLOGICAL: No numbness.  SKIN: No itching or rash  All other review of systems is negative unless indicated above        Medications:  MEDICATIONS  (STANDING):  apixaban 5 milliGRAM(s) Oral two times a day  hydroxychloroquine   Oral   hydroxychloroquine 200 milliGRAM(s) Oral every 12 hours  metoprolol succinate ER 50 milliGRAM(s) Oral daily    MEDICATIONS  (PRN):  acetaminophen   Tablet .. 650 milliGRAM(s) Oral every 4 hours PRN Temp greater or equal to 38.5C (101.3F)  acetaminophen  Suppository .. 650 milliGRAM(s) Rectal every 4 hours PRN Temp greater or equal to 38.5C (101.3F)    Vital Signs Last 24 Hrs  T(C): 36.9 (2020 05:06), Max: 38 (2020 21:25)  T(F): 98.4 (2020 05:06), Max: 100.4 (2020 21:25)  HR: 91 (2020 05:06) (79 - 91)  BP: 146/73 (2020 05:06) (109/65 - 166/71)  BP(mean): --  RR: 24 (2020 05:06) (20 - 24)  SpO2: 96% (2020 05:06) (95% - 96%)         @ 07:01  -  04-03 @ 07:00  --------------------------------------------------------  IN: 240 mL / OUT: 300 mL / NET: -60 mL      LABS:                        10.4   5.42  )-----------( 174      ( 2020 06:42 )             33.5     04-03    131<L>  |  92<L>  |  28<H>  ----------------------------<  100<H>  3.6   |  26  |  1.15    Ca    8.9      2020 06:42  Phos  2.5     04-02  Mg     2.2     04-02    TPro  7.2  /  Alb  3.0<L>  /  TBili  1.1  /  DBili  x   /  AST  47<H>  /  ALT  16  /  AlkPhos  84  04-03        Procalcitonin, Serum: 0.28 ng/mL (20 @ 11:47)      Physical Examination:    General: Non toxic, No acute distress. Sat 96% at visit on 3 liters /nasal     HEENT: Pupils equal, reactive to light.  Symmetric.    PULM: No wheeze/ few basilar crackles    CVS: Regular rate and rhythm, no murmurs, rubs, or gallops    ABD: Soft, nondistended, nontender, normoactive bowel sounds, no masses    EXT: No edema, nontender    SKIN: Warm and well perfused, no rashes noted.    NEURO: Alert, oriented, interactive, nonfocal    RADIOLOGY REVIEWED PERSONALLY  CXR:    CT chest:    PROCEDURE DATE:  2017        INTERPRETATION:  Clinical indications: Evaluate for pneumonia, cough.    Axial CT images of the chest are obtained without intravenous   administration of contrast. MIP images are also submitted.    Comparison is made with the prior chest CT of 2017.    There are no pathologically enlarged bilateral axillary lymph nodes.   There are multiple mediastinal lymph nodes with the largest in the   pretracheal location measuring about 1.4 cm, some of which contain   internal calcifications likely related to a prior granulomatous   infection, unchanged. The heart size is enlarged. There is a patient is   status post CABG. There is atherosclerotic disease of the aorta. There is   no pericardial effusion. There are no pleural effusions.    Evaluation of the upper abdominal organs demonstrate a small hiatal   hernia. There are subcentimeter hepatic calcifications which may also be   related to prior infection unchanged.    Evaluation of the lungs demonstrate emphysema. There are bilateral   peripheral reticular markings with superimposed areas of groundglass and   honeycombing, with a mid to lower lung predominance related to mild to   moderate pulmonaryfibrosis unchanged since 2017. There are no   new pulmonary opacities to suggest pneumonia. There is a 4 mm right   middle lobe nodular opacity unchanged. Tiny bilateral calcified   granulomas are noted. There are no central endobronchial lesions.    Evaluation of the bones demonstrate no significant abnormality. Intact   sternotomy wires are noted.    IMPRESSION: Findings as above related to pulmonary fibrosis unchanged   since 2017.    4 mm right middle lobe nodular opacity is stable. Continued follow-up is   recommended.    Emphysema.        TTE:    PROCEDURE: Transthoracic echocardiogram with 2-D, M-Mode  and complete spectral and color flow Doppler.  INDICATION: Unspecified atrial fibrillation (I48.91)  ------------------------------------------------------------------------  Dimensions:    Normal Values:  LA:     5.0    2.0 - 4.0 cm  Ao:     3.2    2.0 - 3.8 cm  SEPTUM: 1.1    0.6 - 1.2 cm  PWT:    1.1    0.6 - 1.1 cm  LVIDd:  4.6    3.0 - 5.6 cm  LVIDs:  2.9    1.8 - 4.0 cm  Derived variables:  LVMI: 97 g/m2  RWT: 0.47  Fractional short: 37 %  EF (Teicholtz): 67 %  Doppler Peak Velocity (m/sec): AoV=1.2  ------------------------------------------------------------------------  Observations:  Mitral Valve: Mitral annular calcification, otherwise  normal mitral valve. Mild-moderate mitral regurgitation.  Aortic Valve/Aorta: Calcified trileaflet aortic valve with  decreased opening. Peak transaortic valve gradient equals 6  mm Hg, mean transaortic valve gradient equals 4 mm Hg,  estimated aortic valve area equals 1.6 sqcm (by continuity  equation), aortic valve velocity time integral equals 22  cm, consistent with mild aortic stenosis. Peak left  ventricular outflow tract gradient equals 1 mm Hg, mean  gradient is equal to 1 mm Hg, LVOT velocity time integral  equals 11 cm.  Aortic Root: 3.2 cm.  LVOT diameter: 2 cm.  Left Atrium: Normal left atrium.  LA volume index = 31  cc/m2.  Left Ventricle: Endocardium not well visualized; grossly  normal left ventricular systolic function. Normal left  ventricular internal dimensions and wall thicknesses.  Right Heart: Right atrial enlargement RV is off axis,  function appears reduced. Normal tricuspid valve.  Moderate-severe tricuspid regurgitation. Normal pulmonic  valve. Minimal pulmonic regurgitation.  Pericardium/Pleura: Normal pericardium with no pericardial  effusion.  Hemodynamic: Estimated right atrial pressure is 8 mm Hg.  Estimated right ventricular systolic pressure equals 41 mm  Hg, assuming right atrial pressure equals 8 mm Hg,  consistent with mild pulmonary hypertension.  ------------------------------------------------------------------------  Conclusions:  1. Mitral annular calcification, otherwise normal mitral  valve. Mild-moderate mitral regurgitation.  2. Calcified trileaflet aortic valve with decreased  opening. Peak transaortic valve gradient equals 6 mm Hg,  mean transaortic valve gradient equals 4 mm Hg, estimated  aortic valve area equals 1.6 sqcm (by continuity equation),  aortic valve velocity time integral equals 22 cm,  consistent with mild aortic stenosis.  3. Endocardium not well visualized; grossly normal left  ventricular systolic function.  4. RV is off axis, function appears reduced.  5. Normal tricuspid valve. Moderate-severe tricuspid  regurgitation.  6. Estimated pulmonary artery systolic pressure equals 41  mm Hg, assuming right atrial pressure equals 8 mm Hg,  consistent with mild pulmonary pressures.

## 2020-04-03 NOTE — PROGRESS NOTE ADULT - SUBJECTIVE AND OBJECTIVE BOX
[facility] Division of Hospital Medicine Progress Note    Patient is a 86y old  Male who presents with a chief complaint of SOB, desats (03 Apr 2020 11:34)    SUBJECTIVE / OVERNIGHT EVENTS:  ADDITIONAL REVIEW OF SYSTEMS:  no acute events    MEDICATIONS  (STANDING):  apixaban 5 milliGRAM(s) Oral two times a day  hydroxychloroquine   Oral   hydroxychloroquine 200 milliGRAM(s) Oral every 12 hours  metoprolol succinate ER 50 milliGRAM(s) Oral daily    MEDICATIONS  (PRN):  acetaminophen   Tablet .. 650 milliGRAM(s) Oral every 4 hours PRN Temp greater or equal to 38.5C (101.3F)  acetaminophen  Suppository .. 650 milliGRAM(s) Rectal every 4 hours PRN Temp greater or equal to 38.5C (101.3F)    CAPILLARY BLOOD GLUCOSE        I&O's Summary    02 Apr 2020 07:01  -  03 Apr 2020 07:00  --------------------------------------------------------  IN: 240 mL / OUT: 300 mL / NET: -60 mL      PHYSICAL EXAM:  Vital Signs Last 24 Hrs  T(C): 37 (03 Apr 2020 12:02), Max: 38 (02 Apr 2020 21:25)  T(F): 98.6 (03 Apr 2020 12:02), Max: 100.4 (02 Apr 2020 21:25)  HR: 75 (03 Apr 2020 12:02) (75 - 91)  BP: 137/64 (03 Apr 2020 12:02) (137/64 - 166/71)  BP(mean): --  RR: 22 (03 Apr 2020 12:02) (20 - 24)  SpO2: 99% (03 Apr 2020 12:02) (95% - 99%)  CONSTITUTIONAL: NAD  ENMT: moist oral mucosa  RESPIRATORY: Normal respiratory effort; lungs are clear to auscultation bilaterally  CARDIOVASCULAR: Regular rate and rhythm, normal S1 and S2, no murmur/rub/gallop; No lower extremity edema; Peripheral pulses present  ABDOMEN: soft, nt  PSYCH: affect appropriate    LABS:                        10.4   5.42  )-----------( 174      ( 03 Apr 2020 06:42 )             33.5     04-03  131<L>  |  92<L>  |  28<H>  ----------------------------<  100<H>  3.6   |  26  |  1.15    Ca    8.9      03 Apr 2020 06:42  Phos  2.5     04-02  Mg     2.2     04-02    TPro  7.2  /  Alb  3.0<L>  /  TBili  1.1  /  DBili  x   /  AST  47<H>  /  ALT  16  /  AlkPhos  84  04-03      CARDIAC MARKERS ( 02 Apr 2020 11:47 )  x     / x     / 178 U/L / x     / x        COVID-19 PCR: Detected (03-31-20 @ 11:03)

## 2020-04-03 NOTE — CONSULT NOTE ADULT - ASSESSMENT
87 yo male with CAD,MI,CHF and alpha thalassemia admitted with both Covid 19 pneumonia and CHF  He has elevated CRP of 16,ferritin of 685, and LDH of 346.  He is stable on plaquinel although still requiring oxygen.  Suggest:  1. 5 days of plaquenil  2.Monitor O2 sats  3.steroids and or anikinra if he deteriorates, open to debate which first or both together r  4. monitor inflammatory labs as you are doing every 2-3 days  5.strongyloides serology in case steroids needed

## 2020-04-03 NOTE — CONSULT NOTE ADULT - REASON FOR ADMISSION
Acute on chronic diastolic HF, hypoxia, b/l infiltrates, r/o COVID-19
SOB, desats
SOB
SOB, desats

## 2020-04-03 NOTE — CONSULT NOTE ADULT - SUBJECTIVE AND OBJECTIVE BOX
HPI:   Patient is a 86y male with a past history of CAD, HTN, MI,CABG, alpha thalassemia, recently treated on the outside for CHF who was admitted 3/31 with difficulty breathing and evodence of fluid overloaded.  He has also tested positive for Covid 19.He was felt to be in heart failure, has been diuresed and was satrted on plaquinel.He has low grade fever and is on nasal oxygen with sats above 95%    REVIEW OF SYSTEMS:  All other review of systems negative (Comprehensive ROS)    PAST MEDICAL & SURGICAL HISTORY:  Alpha thalassemia  HTN (hypertension)  Dyslipidemia  Myocardial infarction:   CAD (coronary artery disease)  S/P CAB in [facility]      Allergies    No Known Allergies    Intolerances        Antimicrobials Day #  :day 3  hydroxychloroquine   Oral   hydroxychloroquine 200 milliGRAM(s) Oral every 12 hours    Other Medications:  acetaminophen   Tablet .. 650 milliGRAM(s) Oral every 4 hours PRN  acetaminophen  Suppository .. 650 milliGRAM(s) Rectal every 4 hours PRN  apixaban 5 milliGRAM(s) Oral two times a day  metoprolol succinate ER 50 milliGRAM(s) Oral daily      FAMILY HISTORY:N/A      SOCIAL HISTORY:  Smoking:  x   ETOH:   x  Drug Use: x      T(F): 98.4 (20 @ 05:06), Max: 100.4 (20 @ 21:25)  HR: 91 (20 @ 05:06)  BP: 146/73 (20 @ 05:06)  RR: 24 (20 @ 05:06)  SpO2: 96% (20 @ 05:06)  Wt(kg): --    PHYSICAL EXAM:  General: alert, no acute distress  Eyes:  anicteric, no conjunctival injection, no discharge  Oropharynx: no lesions or injection 	  Neck: supple, without adenopathy  Lungs: scattered ronchi  Heart: regular rate and rhythm; no murmur, rubs or gallops  Abdomen: soft, nondistended, nontender, without mass or organomegaly  Skin: no lesions  Extremities: no clubbing, cyanosis, or edema  Neurologic: alert, oriented, moves all extremities    LAB RESULTS:                        10.4   5.42  )-----------( 174      ( 2020 06:42 )             33.5     04-03    131<L>  |  92<L>  |  28<H>  ----------------------------<  100<H>  3.6   |  26  |  1.15    Ca    8.9      2020 06:42  Phos  2.5     04-  Mg     2.2     04-    TPro  7.2  /  Alb  3.0<L>  /  TBili  1.1  /  DBili  x   /  AST  47<H>  /  ALT  16  /  AlkPhos  84  04-03    LIVER FUNCTIONS - ( 2020 06:42 )  Alb: 3.0 g/dL / Pro: 7.2 g/dL / ALK PHOS: 84 U/L / ALT: 16 U/L / AST: 47 U/L / GGT: x               MICROBIOLOGY:  RECENT CULTURES:        RADIOLOGY REVIEWED:  < from: Xray Chest 1 View- PORTABLE-Routine (20 @ 06:50) >  INTERPRETATION:  Chest portable.    Clinical History: Fever, pneumonia follow-up exam.    Comparison: 3/31/2020.    Single AP view submitted.    The evaluation of the cardiomediastinal silhouette is limited on portable technique.  Sternotomy. Prominent cardiac silhouette.  Aortic calcification.    Again noted are coarse reticular nodular interstitial markings bilaterally, with more confluent airspace consolidation in the periphery of the left mid to upper lung zone.    No significant pleural effusion is noted.    Impression:    Findings as discussed above.    < end of copied text >

## 2020-04-04 LAB
ALBUMIN SERPL ELPH-MCNC: 2.8 G/DL — LOW (ref 3.3–5)
ALP SERPL-CCNC: 101 U/L — SIGNIFICANT CHANGE UP (ref 40–120)
ALT FLD-CCNC: 29 U/L — SIGNIFICANT CHANGE UP (ref 10–45)
ANION GAP SERPL CALC-SCNC: 15 MMOL/L — SIGNIFICANT CHANGE UP (ref 5–17)
AST SERPL-CCNC: 65 U/L — HIGH (ref 10–40)
BILIRUB SERPL-MCNC: 1.3 MG/DL — HIGH (ref 0.2–1.2)
BUN SERPL-MCNC: 39 MG/DL — HIGH (ref 7–23)
CALCIUM SERPL-MCNC: 8.8 MG/DL — SIGNIFICANT CHANGE UP (ref 8.4–10.5)
CHLORIDE SERPL-SCNC: 92 MMOL/L — LOW (ref 96–108)
CO2 SERPL-SCNC: 26 MMOL/L — SIGNIFICANT CHANGE UP (ref 22–31)
CREAT SERPL-MCNC: 1.3 MG/DL — SIGNIFICANT CHANGE UP (ref 0.5–1.3)
GLUCOSE SERPL-MCNC: 156 MG/DL — HIGH (ref 70–99)
HCT VFR BLD CALC: 30 % — LOW (ref 39–50)
HGB BLD-MCNC: 10 G/DL — LOW (ref 13–17)
MCHC RBC-ENTMCNC: 22.5 PG — LOW (ref 27–34)
MCHC RBC-ENTMCNC: 33.3 GM/DL — SIGNIFICANT CHANGE UP (ref 32–36)
MCV RBC AUTO: 67.6 FL — LOW (ref 80–100)
NRBC # BLD: 0 /100 WBCS — SIGNIFICANT CHANGE UP (ref 0–0)
PLATELET # BLD AUTO: 205 K/UL — SIGNIFICANT CHANGE UP (ref 150–400)
POTASSIUM SERPL-MCNC: 3.7 MMOL/L — SIGNIFICANT CHANGE UP (ref 3.5–5.3)
POTASSIUM SERPL-SCNC: 3.7 MMOL/L — SIGNIFICANT CHANGE UP (ref 3.5–5.3)
PROT SERPL-MCNC: 7.7 G/DL — SIGNIFICANT CHANGE UP (ref 6–8.3)
RBC # BLD: 4.44 M/UL — SIGNIFICANT CHANGE UP (ref 4.2–5.8)
RBC # FLD: 16.7 % — HIGH (ref 10.3–14.5)
SODIUM SERPL-SCNC: 133 MMOL/L — LOW (ref 135–145)
WBC # BLD: 5.49 K/UL — SIGNIFICANT CHANGE UP (ref 3.8–10.5)
WBC # FLD AUTO: 5.49 K/UL — SIGNIFICANT CHANGE UP (ref 3.8–10.5)

## 2020-04-04 PROCEDURE — 99232 SBSQ HOSP IP/OBS MODERATE 35: CPT

## 2020-04-04 RX ADMIN — APIXABAN 5 MILLIGRAM(S): 2.5 TABLET, FILM COATED ORAL at 10:31

## 2020-04-04 RX ADMIN — Medication 200 MILLIGRAM(S): at 05:32

## 2020-04-04 RX ADMIN — Medication 650 MILLIGRAM(S): at 13:14

## 2020-04-04 RX ADMIN — PANTOPRAZOLE SODIUM 40 MILLIGRAM(S): 20 TABLET, DELAYED RELEASE ORAL at 05:33

## 2020-04-04 RX ADMIN — APIXABAN 5 MILLIGRAM(S): 2.5 TABLET, FILM COATED ORAL at 22:23

## 2020-04-04 RX ADMIN — Medication 200 MILLIGRAM(S): at 17:49

## 2020-04-04 RX ADMIN — Medication 50 MILLIGRAM(S): at 05:32

## 2020-04-04 NOTE — PROGRESS NOTE ADULT - SUBJECTIVE AND OBJECTIVE BOX
[facility] Division of Hospital Medicine Progress Note    Patient is a 86y old  Male who presents with a chief complaint of SOB, desats (04 Apr 2020 11:20)      SUBJECTIVE / OVERNIGHT EVENTS: feels o.k , still has mild SOB   ADDITIONAL REVIEW OF SYSTEMS:    MEDICATIONS  (STANDING):  apixaban 5 milliGRAM(s) Oral two times a day  hydroxychloroquine   Oral   hydroxychloroquine 200 milliGRAM(s) Oral every 12 hours  metoprolol succinate ER 50 milliGRAM(s) Oral daily  pantoprazole    Tablet 40 milliGRAM(s) Oral before breakfast    MEDICATIONS  (PRN):  acetaminophen   Tablet .. 650 milliGRAM(s) Oral every 4 hours PRN Temp greater or equal to 38.5C (101.3F)  acetaminophen  Suppository .. 650 milliGRAM(s) Rectal every 4 hours PRN Temp greater or equal to 38.5C (101.3F)      CAPILLARY BLOOD GLUCOSE        I&O's Summary    03 Apr 2020 07:01  -  04 Apr 2020 07:00  --------------------------------------------------------  IN: 0 mL / OUT: 375 mL / NET: -375 mL    04 Apr 2020 07:01  -  04 Apr 2020 14:27  --------------------------------------------------------  IN: 240 mL / OUT: 100 mL / NET: 140 mL        PHYSICAL EXAM:  Vital Signs Last 24 Hrs  T(C): 36.5 (04 Apr 2020 14:05), Max: 37.1 (03 Apr 2020 22:20)  T(F): 97.7 (04 Apr 2020 14:05), Max: 98.8 (03 Apr 2020 22:20)  HR: 72 (04 Apr 2020 14:05) (72 - 84)  BP: 111/67 (04 Apr 2020 14:05) (111/67 - 145/76)  BP(mean): --  RR: 20 (04 Apr 2020 14:05) (20 - 20)  SpO2: 97% (04 Apr 2020 14:05) (90% - 100%)      LABS:                        10.0   5.49  )-----------( 205      ( 04 Apr 2020 09:28 )             30.0     04-04    133<L>  |  92<L>  |  39<H>  ----------------------------<  156<H>  3.7   |  26  |  1.30    Ca    8.8      04 Apr 2020 09:28    TPro  7.7  /  Alb  2.8<L>  /  TBili  1.3<H>  /  DBili  x   /  AST  65<H>  /  ALT  29  /  AlkPhos  101  04-04    Ferritin, Serum: 685 ng/mL (04.02.20 @ 14:38)        COVID-19 PCR: Detected (03-31-20 @ 11:03)      RADIOLOGY & ADDITIONAL TESTS:  Imaging from Last 24 Hours:   Electrocardiogram/QTc Interval:  < from: Xray Chest 1 View- PORTABLE-Routine (04.01.20 @ 06:50) >  EXAM:  XR CHEST PORTABLE ROUTINE 1V                            PROCEDURE DATE:  04/01/2020          INTERPRETATION:  Chest portable.    Clinical History: Fever, pneumonia follow-up exam.    Comparison: 3/31/2020.    Single AP view submitted.    The evaluation of the cardiomediastinal silhouette is limited on portable technique.  Sternotomy. Prominent cardiac silhouette.  Aortic calcification.    Again noted are coarse reticular nodular interstitial markings bilaterally, with more confluent airspace consolidation in the periphery of the left mid to upper lung zone.    No significant pleural effusion is noted.    Impression:    Findings as discussed above.          COORDINATION OF CARE:    Care Discussed with Consultants/Other Providers:

## 2020-04-04 NOTE — PROGRESS NOTE ADULT - ASSESSMENT
85 yo male with CAD,MI,CHF and alpha thalassemia admitted with both Covid 19 pneumonia and CHF  He has elevated CRP of 16,ferritin of 685, and LDH of 346.  He is stable on plaquinel although still requiring oxygen.  Age and multiple medical co-morbidities  He looks similar to 4/3  Suggest:  1. 5 days of plaquenil  2.Monitor O2 sats  3.steroids and or anikinra if he deteriorates, open to debate which first or both together   4. monitor inflammatory labs as you are doing every 2-3 days  5.strongyloides serology in case steroids needed 85 yo male with CAD,MI,CHF and alpha thalassemia admitted with both Covid 19 pneumonia and CHF  He has elevated CRP of 16,ferritin of 685, and LDH of 346.  He is stable on plaquinel although still requiring oxygen.  HFpEf, moderate MR and Mild AS with backround IPF  Age and multiple medical co-morbidities  He looks similar to 4/3  Suggest:  1. 5 days of plaquenil  2.Monitor O2 sats  3.steroids and or anikinra if he deteriorates, open to debate which first or both together   4. monitor inflammatory labs as you are doing every 2-3 days  5.strongyloides serology in case steroids needed

## 2020-04-04 NOTE — PROGRESS NOTE ADULT - SUBJECTIVE AND OBJECTIVE BOX
CC: f/u for Covid 19 infection    Patient reports: he still feels congested, O2 sats 93 % on nasal oxygen    REVIEW OF SYSTEMS:  All other review of systems negative (Comprehensive ROS)    Antimicrobials Day #  :started 4/2  hydroxychloroquine   Oral   hydroxychloroquine 200 milliGRAM(s) Oral every 12 hours    Other Medications Reviewed    T(F): 98.8 (04-04-20 @ 04:40), Max: 98.8 (04-03-20 @ 22:20)  HR: 84 (04-04-20 @ 04:40)  BP: 145/76 (04-04-20 @ 04:40)  RR: 20 (04-04-20 @ 04:40)  SpO2: 96% (04-04-20 @ 07:28)  Wt(kg): --    PHYSICAL EXAM:  General: alert, no acute distress  Eyes:  anicteric, no conjunctival injection, no discharge  Oropharynx: no lesions or injection 	  Neck: supple, without adenopathy  Lungs: coarse BS  Heart: regular rate and rhythm; no murmur, rubs or gallops  Abdomen: soft, nondistended, nontender, without mass or organomegaly  Skin: no lesions  Extremities: no clubbing, cyanosis, or edema  Neurologic: alert, oriented, moves all extremities    LAB RESULTS:                        10.0   5.49  )-----------( 205      ( 04 Apr 2020 09:28 )             30.0     04-04    133<L>  |  92<L>  |  39<H>  ----------------------------<  156<H>  3.7   |  26  |  1.30    Ca    8.8      04 Apr 2020 09:28  Phos  2.5     04-02  Mg     2.2     04-02    TPro  7.7  /  Alb  2.8<L>  /  TBili  1.3<H>  /  DBili  x   /  AST  65<H>  /  ALT  29  /  AlkPhos  101  04-04    LIVER FUNCTIONS - ( 04 Apr 2020 09:28 )  Alb: 2.8 g/dL / Pro: 7.7 g/dL / ALK PHOS: 101 U/L / ALT: 29 U/L / AST: 65 U/L / GGT: x             MICROBIOLOGY:  RECENT CULTURES:      RADIOLOGY REVIEWED:    < from: Xray Chest 1 View- PORTABLE-Routine (04.01.20 @ 06:50) >  INTERPRETATION:  Chest portable.    Clinical History: Fever, pneumonia follow-up exam.    Comparison: 3/31/2020.    Single AP view submitted.    The evaluation of the cardiomediastinal silhouette is limited on portable technique.  Sternotomy. Prominent cardiac silhouette.  Aortic calcification.    Again noted are coarse reticular nodular interstitial markings bilaterally, with more confluent airspace consolidation in the periphery of the left mid to upper lung zone.    No significant pleural effusion is noted.    Impression:    Findings as discussed above.    < end of copied text >

## 2020-04-04 NOTE — PROGRESS NOTE ADULT - ASSESSMENT
86M with CAD complicated by MI s/p CABG, alpha-thalassemia, HTN/HLD, who presents with SOB found to have acute hypoxemic respiratory failure 2/2 to COVID and fluid overload concern for acute on chronic chf of unknown etiology    Acute hypoxemic respiratory failure.  Plan: 2/2 to viral PNA from COVID 19+, markers slowly increasing.    cont hydroxychloroquine, stable on nasal cannula  ID/pulmonary consult noted   cont o2NC supplementation  currently on 4-5L NC. Escalate Covid therapy if needed   also component of fluid overload on admission, which has resolved  -f/u strongyloides serology if pt to require steroids    #Acute on chronic heart failure, unspecified heart failure type   off IV lasix  obtain 2d echo when able, Echo in Nov 2018 showed normal LV systolic function , mild AS , mild to moderate MR , moderate to severe TR , PASP 41   monitor I/O  hold aceI given ILYA  c/w toprol  also component of hypervolemic hyponatremia- mild Na 133     #HTN (hypertension).  Plan: c/w toprol xl  hold aceI given recent ilya on ckd stage III; BUN elevated. likely resume soon    #Atrial fibrillation, unspecified type.  Plan: cont outpt elquis at 5 mg bid.   PPI    DVT ppx- systemic AC

## 2020-04-05 LAB
ALBUMIN SERPL ELPH-MCNC: 3 G/DL — LOW (ref 3.3–5)
ALP SERPL-CCNC: 127 U/L — HIGH (ref 40–120)
ALT FLD-CCNC: 38 U/L — SIGNIFICANT CHANGE UP (ref 10–45)
ANION GAP SERPL CALC-SCNC: 13 MMOL/L — SIGNIFICANT CHANGE UP (ref 5–17)
AST SERPL-CCNC: 75 U/L — HIGH (ref 10–40)
BILIRUB SERPL-MCNC: 1.1 MG/DL — SIGNIFICANT CHANGE UP (ref 0.2–1.2)
BUN SERPL-MCNC: 44 MG/DL — HIGH (ref 7–23)
CALCIUM SERPL-MCNC: 9.1 MG/DL — SIGNIFICANT CHANGE UP (ref 8.4–10.5)
CHLORIDE SERPL-SCNC: 95 MMOL/L — LOW (ref 96–108)
CO2 SERPL-SCNC: 27 MMOL/L — SIGNIFICANT CHANGE UP (ref 22–31)
CREAT SERPL-MCNC: 1.22 MG/DL — SIGNIFICANT CHANGE UP (ref 0.5–1.3)
CRP SERPL-MCNC: 15.64 MG/DL — HIGH (ref 0–0.4)
FERRITIN SERPL-MCNC: 882 NG/ML — HIGH (ref 30–400)
GLUCOSE BLDC GLUCOMTR-MCNC: 138 MG/DL — HIGH (ref 70–99)
GLUCOSE SERPL-MCNC: 107 MG/DL — HIGH (ref 70–99)
HCT VFR BLD CALC: 30.8 % — LOW (ref 39–50)
HGB BLD-MCNC: 9.4 G/DL — LOW (ref 13–17)
MCHC RBC-ENTMCNC: 20.5 PG — LOW (ref 27–34)
MCHC RBC-ENTMCNC: 30.5 GM/DL — LOW (ref 32–36)
MCV RBC AUTO: 67.2 FL — LOW (ref 80–100)
NRBC # BLD: 0 /100 WBCS — SIGNIFICANT CHANGE UP (ref 0–0)
PLATELET # BLD AUTO: 229 K/UL — SIGNIFICANT CHANGE UP (ref 150–400)
POTASSIUM SERPL-MCNC: 4.4 MMOL/L — SIGNIFICANT CHANGE UP (ref 3.5–5.3)
POTASSIUM SERPL-SCNC: 4.4 MMOL/L — SIGNIFICANT CHANGE UP (ref 3.5–5.3)
PROCALCITONIN SERPL-MCNC: 0.14 NG/ML — HIGH (ref 0.02–0.1)
PROT SERPL-MCNC: 7.3 G/DL — SIGNIFICANT CHANGE UP (ref 6–8.3)
RBC # BLD: 4.58 M/UL — SIGNIFICANT CHANGE UP (ref 4.2–5.8)
RBC # FLD: 15.7 % — HIGH (ref 10.3–14.5)
SODIUM SERPL-SCNC: 135 MMOL/L — SIGNIFICANT CHANGE UP (ref 135–145)
WBC # BLD: 6.14 K/UL — SIGNIFICANT CHANGE UP (ref 3.8–10.5)
WBC # FLD AUTO: 6.14 K/UL — SIGNIFICANT CHANGE UP (ref 3.8–10.5)

## 2020-04-05 PROCEDURE — 99232 SBSQ HOSP IP/OBS MODERATE 35: CPT

## 2020-04-05 RX ADMIN — APIXABAN 5 MILLIGRAM(S): 2.5 TABLET, FILM COATED ORAL at 21:48

## 2020-04-05 RX ADMIN — Medication 200 MILLIGRAM(S): at 04:03

## 2020-04-05 RX ADMIN — Medication 50 MILLIGRAM(S): at 04:03

## 2020-04-05 RX ADMIN — PANTOPRAZOLE SODIUM 40 MILLIGRAM(S): 20 TABLET, DELAYED RELEASE ORAL at 04:04

## 2020-04-05 RX ADMIN — Medication 200 MILLIGRAM(S): at 17:17

## 2020-04-05 RX ADMIN — APIXABAN 5 MILLIGRAM(S): 2.5 TABLET, FILM COATED ORAL at 08:49

## 2020-04-05 NOTE — PROGRESS NOTE ADULT - SUBJECTIVE AND OBJECTIVE BOX
[facility] Division of Hospital Medicine Progress Note    Patient is a 86y old  Male who presents with a chief complaint of SOB, desats (05 Apr 2020 10:39)      SUBJECTIVE / OVERNIGHT EVENTS: No new complaints , on NC 3L , appears comfortable   ADDITIONAL REVIEW OF SYSTEMS:    MEDICATIONS  (STANDING):  apixaban 5 milliGRAM(s) Oral two times a day  hydroxychloroquine   Oral   hydroxychloroquine 200 milliGRAM(s) Oral every 12 hours  metoprolol succinate ER 50 milliGRAM(s) Oral daily  pantoprazole    Tablet 40 milliGRAM(s) Oral before breakfast    MEDICATIONS  (PRN):  acetaminophen   Tablet .. 650 milliGRAM(s) Oral every 4 hours PRN Temp greater or equal to 38.5C (101.3F)  acetaminophen  Suppository .. 650 milliGRAM(s) Rectal every 4 hours PRN Temp greater or equal to 38.5C (101.3F)      CAPILLARY BLOOD GLUCOSE      POCT Blood Glucose.: 138 mg/dL (05 Apr 2020 08:31)    I&O's Summary    04 Apr 2020 07:01  -  05 Apr 2020 07:00  --------------------------------------------------------  IN: 360 mL / OUT: 100 mL / NET: 260 mL        PHYSICAL EXAM:  Vital Signs Last 24 Hrs  T(C): 36.4 (05 Apr 2020 04:30), Max: 36.5 (04 Apr 2020 14:05)  T(F): 97.5 (05 Apr 2020 04:30), Max: 97.7 (04 Apr 2020 14:05)  HR: 73 (05 Apr 2020 04:30) (72 - 73)  BP: 138/74 (05 Apr 2020 04:30) (111/67 - 138/74)  BP(mean): --  RR: 20 (05 Apr 2020 04:30) (20 - 20)  SpO2: 90% (05 Apr 2020 09:17) (90% - 98%)    LABS:                        9.4    6.14  )-----------( 229      ( 05 Apr 2020 09:29 )             30.8     04-05    135  |  95<L>  |  44<H>  ----------------------------<  107<H>  4.4   |  27  |  1.22    Ca    9.1      05 Apr 2020 10:22    TPro  7.3  /  Alb  3.0<L>  /  TBili  1.1  /  DBili  x   /  AST  75<H>  /  ALT  38  /  AlkPhos  127<H>  04-05              COVID-19 PCR: Detected (03-31-20 @ 11:03)      RADIOLOGY & ADDITIONAL TESTS:  Imaging from Last 24 Hours:  Electrocardiogram/QTc Interval:    COORDINATION OF CARE:  Care Discussed with Consultants/Other Providers:

## 2020-04-05 NOTE — PHYSICAL THERAPY INITIAL EVALUATION ADULT - PERTINENT HX OF CURRENT PROBLEM, REHAB EVAL
86M with CAD complicated by MI s/p CABG, alpha-thalassemia, HTN/HLD, who presents with SOB.  Per notes, family say patient has been having worsening BLE edema, abdominal distension, and SOB over the past couple of months.  He was then recently started on lasix 20mg every other day.  Patient has been diuresing well but his BP was elevated and his lisinopril was increased from 10 to 20mg.  Last night, patient started have an acute episode of SOB with desats.

## 2020-04-05 NOTE — CHART NOTE - NSCHARTNOTEFT_GEN_A_CORE
Spoke with Dr. Irwin, who is the patient's granddaughter and healthcare proxy.  Despite witnessing possible aspiration this morning, Dr. Irwin would like her grandfather to receive a pureed diet with thickened liquids.  Being NPO is not in line with the patient's goals of care.  This information for relayed to the covering nurse.  Speech and swallow consult is pending.

## 2020-04-05 NOTE — PROGRESS NOTE ADULT - ASSESSMENT
85 yo male with CAD,MI,CHF and alpha thalassemia admitted with both Covid 19 pneumonia and CHF  He has elevated CRP of 16,ferritin of 685, and LDH of 346.  He is stable on plaquinel although still requiring oxygen.  HFpEf, moderate MR and Mild AS with backround IPF  Age and multiple medical co-morbidities  He looks similar to 4/3 and 4/4.No signs of progression.  Suggest:  1. 5 days of plaquenil  2.Monitor O2 sats  3.steroids and or anikinra if he deteriorates, open to debate which first or both together   4. monitor inflammatory labs as you are doing every 2-3 days  5.strongyloides serology in case steroids needed

## 2020-04-05 NOTE — PROGRESS NOTE ADULT - ASSESSMENT
86M with CAD complicated by MI s/p CABG, alpha-thalassemia, HTN/HLD, who presents with SOB found to have acute hypoxemic respiratory failure 2/2 to COVID and fluid overload concern for acute on chronic chf of unknown etiology    Acute hypoxemic respiratory failure.  2/2 to viral PNA from COVID 19+  Cont hydroxychloroquine- day 5, stable on nasal cannula  ID/pulmonary consult noted   cont O2  supplementation via NC 3L  Escalate Covid therapy if deteriorates   fluid overload on admission, which has resolved  F//u strongyloides serology if pt to require steroids    #Acute on chronic heart failure, unspecified heart failure type   off IV lasix  obtain 2d echo when able, Echo in Nov 2018 showed normal LV systolic function , mild AS , mild to moderate MR , moderate to severe TR , PASP 41   monitor I/O  hold aceI given ILYA, Cr improving 1.22  c/w toprol  also component of hypervolemic hyponatremia- improving Na135 today     #HTN (hypertension): BP stable and continue  toprol xl  hold aceI given recent ilya on ckd stage III; BUN /Cr 44/1.22 , restart if stable     #Atrial fibrillation, unspecified type.  Plan: cont outpt elquis at 5 mg bid.   PPI    DVT ppx- systemic AC 86M with CAD complicated by MI s/p CABG, alpha-thalassemia, HTN/HLD, who presents with SOB found to have acute hypoxemic respiratory failure 2/2 to COVID and fluid overload concern for acute on chronic chf of unknown etiology    Acute hypoxemic respiratory failure.  2/2 to viral PNA from COVID 19+  Cont hydroxychloroquine- day 5, stable on nasal cannula 3L  ID/pulmonary consult noted   cont O2  supplementation via NC 3L  Escalate Covid therapy if deteriorates   fluid overload on admission, which has resolved  F//u strongyloides serology if pt to require steroids    #Acute on chronic heart failure, unspecified heart failure type   off IV lasix  obtain 2d echo when able, Echo in Nov 2018 showed normal LV systolic function , mild AS , mild to moderate MR , moderate to severe TR , PASP 41   monitor I/O  hold aceI given ILYA, Cr improving 1.22  c/w toprol  also component of hypervolemic hyponatremia- improving Na135 today     #HTN (hypertension): BP stable and continue  toprol xl  hold aceI given recent ilya on ckd stage III; BUN /Cr 44/1.22 , restart if stable     #Atrial fibrillation, unspecified type.  Plan: cont outpt elquis at 5 mg bid.   PPI    DVT ppx- systemic AC

## 2020-04-05 NOTE — PROGRESS NOTE ADULT - SUBJECTIVE AND OBJECTIVE BOX
CC: f/u for Covid 19 pneumonia    Patient reports: no change, he is comfortable on 3 liters    REVIEW OF SYSTEMS:  All other review of systems negative (Comprehensive ROS)    Antimicrobials Day #  :started 4/2  hydroxychloroquine   Oral   hydroxychloroquine 200 milliGRAM(s) Oral every 12 hours    Other Medications Reviewed    T(F): 97.5 (04-05-20 @ 04:30), Max: 97.7 (04-04-20 @ 14:05)  HR: 73 (04-05-20 @ 04:30)  BP: 138/74 (04-05-20 @ 04:30)  RR: 20 (04-05-20 @ 04:30)  SpO2: 90% (04-05-20 @ 09:17)  Wt(kg): --    PHYSICAL EXAM:  General: alert, no acute distress  Eyes:  anicteric, no conjunctival injection, no discharge  Oropharynx: no lesions or injection 	  Neck: supple, without adenopathy  Lungs: clear to auscultation  Heart: regular rate and rhythm; no murmur, rubs or gallops  Abdomen: soft, nondistended, nontender, without mass or organomegaly  Skin: no lesions  Extremities: no clubbing, cyanosis, or edema  Neurologic: alert, oriented, moves all extremities    LAB RESULTS:                        9.4    6.14  )-----------( 229      ( 05 Apr 2020 09:29 )             30.8     04-04    133<L>  |  92<L>  |  39<H>  ----------------------------<  156<H>  3.7   |  26  |  1.30    Ca    8.8      04 Apr 2020 09:28    TPro  7.7  /  Alb  2.8<L>  /  TBili  1.3<H>  /  DBili  x   /  AST  65<H>  /  ALT  29  /  AlkPhos  101  04-04    LIVER FUNCTIONS - ( 04 Apr 2020 09:28 )  Alb: 2.8 g/dL / Pro: 7.7 g/dL / ALK PHOS: 101 U/L / ALT: 29 U/L / AST: 65 U/L / GGT: x             MICROBIOLOGY:  RECENT CULTURES:      RADIOLOGY REVIEWED:

## 2020-04-05 NOTE — PHYSICAL THERAPY INITIAL EVALUATION ADULT - PLANNED THERAPY INTERVENTIONS, PT EVAL
1. GOAL: In 3 weeks, pt will be able to navigate 3 steps w/ supervision./transfer training/gait training/bed mobility training

## 2020-04-06 DIAGNOSIS — N17.9 ACUTE KIDNEY FAILURE, UNSPECIFIED: ICD-10-CM

## 2020-04-06 LAB
ALBUMIN SERPL ELPH-MCNC: 2.7 G/DL — LOW (ref 3.3–5)
ALP SERPL-CCNC: 132 U/L — HIGH (ref 40–120)
ALT FLD-CCNC: 43 U/L — SIGNIFICANT CHANGE UP (ref 10–45)
ANION GAP SERPL CALC-SCNC: 12 MMOL/L — SIGNIFICANT CHANGE UP (ref 5–17)
AST SERPL-CCNC: 72 U/L — HIGH (ref 10–40)
BASOPHILS # BLD AUTO: 0.02 K/UL — SIGNIFICANT CHANGE UP (ref 0–0.2)
BASOPHILS NFR BLD AUTO: 0.3 % — SIGNIFICANT CHANGE UP (ref 0–2)
BILIRUB SERPL-MCNC: 1.3 MG/DL — HIGH (ref 0.2–1.2)
BUN SERPL-MCNC: 40 MG/DL — HIGH (ref 7–23)
CALCIUM SERPL-MCNC: 8.9 MG/DL — SIGNIFICANT CHANGE UP (ref 8.4–10.5)
CHLORIDE SERPL-SCNC: 96 MMOL/L — SIGNIFICANT CHANGE UP (ref 96–108)
CO2 SERPL-SCNC: 28 MMOL/L — SIGNIFICANT CHANGE UP (ref 22–31)
CREAT SERPL-MCNC: 1.16 MG/DL — SIGNIFICANT CHANGE UP (ref 0.5–1.3)
EOSINOPHIL # BLD AUTO: 0.12 K/UL — SIGNIFICANT CHANGE UP (ref 0–0.5)
EOSINOPHIL NFR BLD AUTO: 1.8 % — SIGNIFICANT CHANGE UP (ref 0–6)
GLUCOSE SERPL-MCNC: 134 MG/DL — HIGH (ref 70–99)
HCT VFR BLD CALC: 30 % — LOW (ref 39–50)
HGB BLD-MCNC: 9.4 G/DL — LOW (ref 13–17)
IMM GRANULOCYTES NFR BLD AUTO: 0.9 % — SIGNIFICANT CHANGE UP (ref 0–1.5)
LYMPHOCYTES # BLD AUTO: 0.42 K/UL — LOW (ref 1–3.3)
LYMPHOCYTES # BLD AUTO: 6.4 % — LOW (ref 13–44)
MAGNESIUM SERPL-MCNC: 2.2 MG/DL — SIGNIFICANT CHANGE UP (ref 1.6–2.6)
MCHC RBC-ENTMCNC: 21.2 PG — LOW (ref 27–34)
MCHC RBC-ENTMCNC: 31.3 GM/DL — LOW (ref 32–36)
MCV RBC AUTO: 67.6 FL — LOW (ref 80–100)
MONOCYTES # BLD AUTO: 0.44 K/UL — SIGNIFICANT CHANGE UP (ref 0–0.9)
MONOCYTES NFR BLD AUTO: 6.7 % — SIGNIFICANT CHANGE UP (ref 2–14)
NEUTROPHILS # BLD AUTO: 5.53 K/UL — SIGNIFICANT CHANGE UP (ref 1.8–7.4)
NEUTROPHILS NFR BLD AUTO: 83.9 % — HIGH (ref 43–77)
PHOSPHATE SERPL-MCNC: 2.6 MG/DL — SIGNIFICANT CHANGE UP (ref 2.5–4.5)
PLATELET # BLD AUTO: 265 K/UL — SIGNIFICANT CHANGE UP (ref 150–400)
POTASSIUM SERPL-MCNC: 4.2 MMOL/L — SIGNIFICANT CHANGE UP (ref 3.5–5.3)
POTASSIUM SERPL-SCNC: 4.2 MMOL/L — SIGNIFICANT CHANGE UP (ref 3.5–5.3)
PROT SERPL-MCNC: 7.1 G/DL — SIGNIFICANT CHANGE UP (ref 6–8.3)
RBC # BLD: 4.44 M/UL — SIGNIFICANT CHANGE UP (ref 4.2–5.8)
RBC # FLD: 15.3 % — HIGH (ref 10.3–14.5)
SODIUM SERPL-SCNC: 136 MMOL/L — SIGNIFICANT CHANGE UP (ref 135–145)
STRONGYLOIDES AB SER-ACNC: NEGATIVE — SIGNIFICANT CHANGE UP
STRONGYLOIDES AB SER-ACNC: NEGATIVE — SIGNIFICANT CHANGE UP
WBC # BLD: 6.59 K/UL — SIGNIFICANT CHANGE UP (ref 3.8–10.5)
WBC # FLD AUTO: 6.59 K/UL — SIGNIFICANT CHANGE UP (ref 3.8–10.5)

## 2020-04-06 PROCEDURE — 99232 SBSQ HOSP IP/OBS MODERATE 35: CPT

## 2020-04-06 RX ADMIN — PANTOPRAZOLE SODIUM 40 MILLIGRAM(S): 20 TABLET, DELAYED RELEASE ORAL at 05:16

## 2020-04-06 RX ADMIN — APIXABAN 5 MILLIGRAM(S): 2.5 TABLET, FILM COATED ORAL at 22:30

## 2020-04-06 RX ADMIN — Medication 200 MILLIGRAM(S): at 05:16

## 2020-04-06 RX ADMIN — APIXABAN 5 MILLIGRAM(S): 2.5 TABLET, FILM COATED ORAL at 12:12

## 2020-04-06 RX ADMIN — Medication 50 MILLIGRAM(S): at 05:16

## 2020-04-06 NOTE — PROGRESS NOTE ADULT - SUBJECTIVE AND OBJECTIVE BOX
Patient is a 86y old  Male who presents with a chief complaint of SOB, desats (05 Apr 2020 11:16)      SUBJECTIVE / OVERNIGHT EVENTS: Patient seen and examined at bedside. He states he is hungry this morning, family would like pt to receive a pureed diet with thickened liquids. Denies any SOB, CP, abd pain and n/v. he complaints of generalized weakness.     ROS:  All other review of systems negative    Allergies    No Known Allergies    Intolerances        MEDICATIONS  (STANDING):  apixaban 5 milliGRAM(s) Oral two times a day  metoprolol succinate ER 50 milliGRAM(s) Oral daily  pantoprazole    Tablet 40 milliGRAM(s) Oral before breakfast    MEDICATIONS  (PRN):  acetaminophen   Tablet .. 650 milliGRAM(s) Oral every 4 hours PRN Temp greater or equal to 38.5C (101.3F)  acetaminophen  Suppository .. 650 milliGRAM(s) Rectal every 4 hours PRN Temp greater or equal to 38.5C (101.3F)      Vital Signs Last 24 Hrs  T(C): 36.4 (06 Apr 2020 13:39), Max: 37.1 (05 Apr 2020 20:50)  T(F): 97.5 (06 Apr 2020 13:39), Max: 98.8 (05 Apr 2020 20:50)  HR: 73 (06 Apr 2020 13:39) (73 - 84)  BP: 145/72 (06 Apr 2020 13:39) (145/72 - 168/72)  BP(mean): --  RR: 18 (06 Apr 2020 13:39) (18 - 22)  SpO2: 96% (06 Apr 2020 13:39) (96% - 99%)  CAPILLARY BLOOD GLUCOSE        I&O's Summary    05 Apr 2020 07:01  -  06 Apr 2020 07:00  --------------------------------------------------------  IN: 60 mL / OUT: 475 mL / NET: -415 mL        PHYSICAL EXAM:  GENERAL: NAD, well-developed, + NC 5L  HEAD:  Atraumatic, Normocephalic  EYES: EOMI, PERRLA, conjunctiva and sclera clear  NECK: Supple, No JVD  CHEST/LUNG: Clear to auscultation bilaterally; No wheeze  HEART: Regular rate and rhythm; No murmurs, rubs, or gallops  ABDOMEN: Soft, Nontender, Nondistended; Bowel sounds present  EXTREMITIES:  2+ Peripheral Pulses, No clubbing, cyanosis, or edema  NEUROLOGY: AAOx3, non-focal  PSYCH: calm  SKIN: No rashes or lesions    LABS:                        9.4    6.59  )-----------( 265      ( 06 Apr 2020 07:27 )             30.0     04-06    136  |  96  |  40<H>  ----------------------------<  134<H>  4.2   |  28  |  1.16    Ca    8.9      06 Apr 2020 07:27  Phos  2.6     04-06  Mg     2.2     04-06    TPro  7.1  /  Alb  2.7<L>  /  TBili  1.3<H>  /  DBili  x   /  AST  72<H>  /  ALT  43  /  AlkPhos  132<H>  04-06              RADIOLOGY & ADDITIONAL TESTS:    Care Discussed with Consultants/Other Providers: Medicine NP

## 2020-04-06 NOTE — PROGRESS NOTE ADULT - ASSESSMENT
86M with CAD complicated by MI s/p CABG, alpha-thalassemia, HTN/HLD, who presents with SOB found to have acute hypoxemic respiratory failure 2/2 to COVID and fluid overload concern for acute on chronic chf of unknown etiology

## 2020-04-06 NOTE — PROGRESS NOTE ADULT - SUBJECTIVE AND OBJECTIVE BOX
CC: f/u for Covid 19 pneumonia    Patient reports: no complaints, comfortable on nasal canula    REVIEW OF SYSTEMS:  All other review of systems negative (Comprehensive ROS): dyspnea    Antimicrobials Day #  :s/p plaquenil    Other Medications Reviewed    T(F): 97.5 (04-06-20 @ 13:39), Max: 98.8 (04-05-20 @ 20:50)  HR: 73 (04-06-20 @ 13:39)  BP: 145/72 (04-06-20 @ 13:39)  RR: 18 (04-06-20 @ 13:39)  SpO2: 96% (04-06-20 @ 13:39)  Wt(kg): --    PHYSICAL EXAM:  General: alert, no acute distress  Eyes:  anicteric, no conjunctival injection, no discharge  Oropharynx: no lesions or injection 	  Neck: supple, without adenopathy  Lungs: clear to auscultation  Heart: regular rate and rhythm; no murmur, rubs or gallops  Abdomen: soft, nondistended, nontender, without mass or organomegaly  Skin: no lesions  Extremities: no clubbing, cyanosis, or edema  Neurologic: alert, oriented, moves all extremities    LAB RESULTS:                        9.4    6.59  )-----------( 265      ( 06 Apr 2020 07:27 )             30.0     04-06    136  |  96  |  40<H>  ----------------------------<  134<H>  4.2   |  28  |  1.16    Ca    8.9      06 Apr 2020 07:27  Phos  2.6     04-06  Mg     2.2     04-06    TPro  7.1  /  Alb  2.7<L>  /  TBili  1.3<H>  /  DBili  x   /  AST  72<H>  /  ALT  43  /  AlkPhos  132<H>  04-06    LIVER FUNCTIONS - ( 06 Apr 2020 07:27 )  Alb: 2.7 g/dL / Pro: 7.1 g/dL / ALK PHOS: 132 U/L / ALT: 43 U/L / AST: 72 U/L / GGT: x             MICROBIOLOGY:  RECENT CULTURES:      RADIOLOGY REVIEWED:  < from: Xray Chest 1 View- PORTABLE-Routine (04.01.20 @ 06:50) >  INTERPRETATION:  Chest portable.    Clinical History: Fever, pneumonia follow-up exam.    Comparison: 3/31/2020.    Single AP view submitted.    The evaluation of the cardiomediastinal silhouette is limited on portable technique.  Sternotomy. Prominent cardiac silhouette.  Aortic calcification.    Again noted are coarse reticular nodular interstitial markings bilaterally, with more confluent airspace consolidation in the periphery of the left mid to upper lung zone.    No significant pleural effusion is noted.    Impression:    Findings as discussed above.    < end of copied text >

## 2020-04-06 NOTE — PROGRESS NOTE ADULT - PROBLEM SELECTOR PLAN 2
off IV lasix, currently euvolemic   obtain 2d echo when able, Echo in Nov 2018 showed normal LV systolic function , mild AS , mild to moderate MR , moderate to severe TR , PASP 41   monitor I/O  holding aceI given ILYA, Cr improving, will restart tomorrow if Cr is stable  c/w toprol

## 2020-04-06 NOTE — PROGRESS NOTE ADULT - PROBLEM SELECTOR PLAN 4
hold aceI given recent lauro on ckd stage III;   Cr: 1.16 ,   will restart lisinopril tomorrow if Cr is stable

## 2020-04-06 NOTE — DIETITIAN INITIAL EVALUATION ADULT. - ADD RECOMMEND
1) Defer diet consistency to SLP and team/family wishes. Consider liberalizing diet Low sodium, no beef. Defer fluids to team, pt currently on 1000ml fluid restriction. 2) Continue Ensure Enlive BID (honey thick). Encourage PO intake of protein-rich foods. 3) RD to remain available and follow-up as medically appropriate.

## 2020-04-06 NOTE — PROGRESS NOTE ADULT - ASSESSMENT
87 yo male with CAD,MI,CHF and alpha thalassemia admitted with both Covid 19 pneumonia and CHF  He has elevated CRP of 16,ferritin of 685, and LDH of 346.  He is stable on plaquinel although still requiring oxygen.  HFpEf, moderate MR and Mild AS with backround IPF  Age and multiple medical co-morbidities  He looks similar to 4/3 and 4/4.No signs of progression.If anything he may be slowly improving.  Suggest:  1. S/P  5 days of plaquenil  2.Monitor O2 sats  3.steroids and or anikinra if he deteriorates, open to debate which first or both together   5.strongyloides serology in case steroids needed  6.Supportive care for viral pneumonia

## 2020-04-06 NOTE — PROGRESS NOTE ADULT - PROBLEM SELECTOR PLAN 1
2/2 to viral PNA from COVID 19+  s/p hydroxychloroquine x 5d, stable on nasal cannula 5L  ID/pulmonary consult noted   Escalate Covid therapy if deteriorates   fluid overload on admission, which has resolved  monitor CRP / ferritin /ddimer  Diet: d/w with dysphagia diet, pt pending SLP consult?

## 2020-04-06 NOTE — DIETITIAN INITIAL EVALUATION ADULT. - OTHER INFO
Pertinent Information: This is a 86M with CAD complicated by MI s/p CABG, alpha-thalassemia, HTN/HLD, who presents with SOB. Positive for Covid 19.     Unable to conduct a face to face interview or nutrition-focused physical exam due to limited contact restrictions related to patient's medical condition and isolation precaution. Unable to reach patient over the phone despite multiple attempts, also unable to reach emergency contact for additional information despite multiple attempts. Limited subjective information available at this time. Per chart family had reported pt with abdominal distension and SOB over the past couple of months, with breathing worsening prompting admission. Unable to assess adequacy of PO intake PTA. NKFA as well as micronutrient supplementation noted. No chewing/swallowing difficulty, nausea, vomiting, diarrhea, constipation noted PTA.     Weights: Current dosing weight noted as 163.7lbs (3/31). Weight per [facility] as follows: 153.8lbs (11/2017), 164.6lbs (11/2018). Weight appears relatively stable for the last ~5 months, will continue to monitor.     Pt previously on regular consistency diet, RN noticed patient with difficulty swallowing on 4/5. Per chart family wishes to remain on PO diet, pt placed on Dysphagia 1 with honey thick liquids, currently pending SLP evaluation. Pt noted with variable PO intake in-house, with patient completing 5-80% of meals, of note pt reciving food from home. Pt currently receiving Ensure Enlive supplements BID. Last BM noted on 3/31, consider addition of bowel. RD to remain available and follow-up as medically appropriate.

## 2020-04-06 NOTE — DIETITIAN INITIAL EVALUATION ADULT. - PHYSICAL APPEARANCE
other (specify) Ht: 67 inches , Wt: 163.7lbs, BMI: 25.7kg/m2, IBW: 148lbs +/- 10%, %IBW: 110%  Edema: none, Skin: free of pressure injuries per nursing flow sheets

## 2020-04-07 LAB
ANION GAP SERPL CALC-SCNC: 12 MMOL/L — SIGNIFICANT CHANGE UP (ref 5–17)
BUN SERPL-MCNC: 39 MG/DL — HIGH (ref 7–23)
CALCIUM SERPL-MCNC: 9 MG/DL — SIGNIFICANT CHANGE UP (ref 8.4–10.5)
CHLORIDE SERPL-SCNC: 101 MMOL/L — SIGNIFICANT CHANGE UP (ref 96–108)
CO2 SERPL-SCNC: 28 MMOL/L — SIGNIFICANT CHANGE UP (ref 22–31)
CREAT SERPL-MCNC: 1.15 MG/DL — SIGNIFICANT CHANGE UP (ref 0.5–1.3)
D DIMER BLD IA.RAPID-MCNC: 1700 NG/ML DDU — HIGH
FERRITIN SERPL-MCNC: 795 NG/ML — HIGH (ref 30–400)
GLUCOSE SERPL-MCNC: 117 MG/DL — HIGH (ref 70–99)
HCT VFR BLD CALC: 29.5 % — LOW (ref 39–50)
HGB BLD-MCNC: 9.1 G/DL — LOW (ref 13–17)
MAGNESIUM SERPL-MCNC: 2.2 MG/DL — SIGNIFICANT CHANGE UP (ref 1.6–2.6)
MCHC RBC-ENTMCNC: 20.7 PG — LOW (ref 27–34)
MCHC RBC-ENTMCNC: 30.8 GM/DL — LOW (ref 32–36)
MCV RBC AUTO: 67.2 FL — LOW (ref 80–100)
NRBC # BLD: 0 /100 WBCS — SIGNIFICANT CHANGE UP (ref 0–0)
PHOSPHATE SERPL-MCNC: 3.3 MG/DL — SIGNIFICANT CHANGE UP (ref 2.5–4.5)
PLATELET # BLD AUTO: 266 K/UL — SIGNIFICANT CHANGE UP (ref 150–400)
POTASSIUM SERPL-MCNC: 4.5 MMOL/L — SIGNIFICANT CHANGE UP (ref 3.5–5.3)
POTASSIUM SERPL-SCNC: 4.5 MMOL/L — SIGNIFICANT CHANGE UP (ref 3.5–5.3)
RBC # BLD: 4.39 M/UL — SIGNIFICANT CHANGE UP (ref 4.2–5.8)
RBC # FLD: 15.2 % — HIGH (ref 10.3–14.5)
SODIUM SERPL-SCNC: 141 MMOL/L — SIGNIFICANT CHANGE UP (ref 135–145)
WBC # BLD: 5.98 K/UL — SIGNIFICANT CHANGE UP (ref 3.8–10.5)
WBC # FLD AUTO: 5.98 K/UL — SIGNIFICANT CHANGE UP (ref 3.8–10.5)

## 2020-04-07 PROCEDURE — 99232 SBSQ HOSP IP/OBS MODERATE 35: CPT

## 2020-04-07 RX ORDER — LISINOPRIL 2.5 MG/1
10 TABLET ORAL DAILY
Refills: 0 | Status: DISCONTINUED | OUTPATIENT
Start: 2020-04-07 | End: 2020-04-09

## 2020-04-07 RX ADMIN — APIXABAN 5 MILLIGRAM(S): 2.5 TABLET, FILM COATED ORAL at 09:36

## 2020-04-07 RX ADMIN — LISINOPRIL 10 MILLIGRAM(S): 2.5 TABLET ORAL at 09:36

## 2020-04-07 RX ADMIN — APIXABAN 5 MILLIGRAM(S): 2.5 TABLET, FILM COATED ORAL at 22:18

## 2020-04-07 RX ADMIN — Medication 50 MILLIGRAM(S): at 05:12

## 2020-04-07 RX ADMIN — PANTOPRAZOLE SODIUM 40 MILLIGRAM(S): 20 TABLET, DELAYED RELEASE ORAL at 05:12

## 2020-04-07 NOTE — SWALLOW BEDSIDE ASSESSMENT ADULT - SPECIFY REASON(S)
Pt was seen to determine current swallowing function and determine least restrictive diet, if appropriate.

## 2020-04-07 NOTE — SWALLOW BEDSIDE ASSESSMENT ADULT - SWALLOW EVAL: RECOMMENDED DIET
D1 and honey thick liquids - single sips / bites Dysphagia 1 and honey thick liquids - single sips / bites

## 2020-04-07 NOTE — PROGRESS NOTE ADULT - SUBJECTIVE AND OBJECTIVE BOX
Patient is a 86y old  Male who presents with a chief complaint of SOB, desats (06 Apr 2020 15:17)      SUBJECTIVE / OVERNIGHT EVENTS:  Patient seen and examined at bedside. He denies any new complaints, states his throat is dry. Denies SOB, abd pain and n/v.     ROS:  All other review of systems negative    Allergies    No Known Allergies    Intolerances        MEDICATIONS  (STANDING):  apixaban 5 milliGRAM(s) Oral two times a day  lisinopril 10 milliGRAM(s) Oral daily  metoprolol succinate ER 50 milliGRAM(s) Oral daily  pantoprazole    Tablet 40 milliGRAM(s) Oral before breakfast    MEDICATIONS  (PRN):  acetaminophen   Tablet .. 650 milliGRAM(s) Oral every 4 hours PRN Temp greater or equal to 38.5C (101.3F)  acetaminophen  Suppository .. 650 milliGRAM(s) Rectal every 4 hours PRN Temp greater or equal to 38.5C (101.3F)      Vital Signs Last 24 Hrs  T(C): 36.3 (07 Apr 2020 09:30), Max: 36.6 (06 Apr 2020 22:27)  T(F): 97.4 (07 Apr 2020 09:30), Max: 97.8 (06 Apr 2020 22:27)  HR: 72 (07 Apr 2020 09:30) (72 - 94)  BP: 141/67 (07 Apr 2020 09:30) (127/70 - 141/67)  BP(mean): --  RR: 18 (07 Apr 2020 09:30) (18 - 18)  SpO2: 96% (07 Apr 2020 09:30) (92% - 96%)  CAPILLARY BLOOD GLUCOSE        I&O's Summary    06 Apr 2020 07:01  -  07 Apr 2020 07:00  --------------------------------------------------------  IN: 300 mL / OUT: 500 mL / NET: -200 mL    07 Apr 2020 07:01  -  07 Apr 2020 14:54  --------------------------------------------------------  IN: 360 mL / OUT: 0 mL / NET: 360 mL        PHYSICAL EXAM:  GENERAL: NAD, well-developed, + NC  HEAD:  Atraumatic, Normocephalic  EYES: EOMI, PERRLA, conjunctiva and sclera clear  NECK: Supple, No JVD  CHEST/LUNG: Clear to auscultation bilaterally; No wheeze  HEART: Regular rate and rhythm; No murmurs, rubs, or gallops  ABDOMEN: Soft, Nontender, Nondistended; Bowel sounds present  EXTREMITIES:  2+ Peripheral Pulses, No clubbing, cyanosis, or edema  NEUROLOGY: AAOx3, non-focal  PSYCH: calm  SKIN: No rashes or lesions    LABS:                        9.1    5.98  )-----------( 266      ( 07 Apr 2020 06:47 )             29.5     04-07    141  |  101  |  39<H>  ----------------------------<  117<H>  4.5   |  28  |  1.15    Ca    9.0      07 Apr 2020 06:43  Phos  3.3     04-07  Mg     2.2     04-07    TPro  7.1  /  Alb  2.7<L>  /  TBili  1.3<H>  /  DBili  x   /  AST  72<H>  /  ALT  43  /  AlkPhos  132<H>  04-06              RADIOLOGY & ADDITIONAL TESTS:  Care Discussed with Consultants/Other Providers: Medicine NP     Case Discussed with Curtis Crabtree

## 2020-04-07 NOTE — SWALLOW BEDSIDE ASSESSMENT ADULT - SLP GENERAL OBSERVATIONS
Pt received awake/alert in bed. He was cooperative and pleasant throughout this assessment. Pt received awake/alert in bed. He was cooperative and pleasant throughout this assessment. Nasal cannula in place, 2L. Breathing comfortably at rest.

## 2020-04-07 NOTE — SWALLOW BEDSIDE ASSESSMENT ADULT - SWALLOW EVAL: DIAGNOSIS
Currently pt is presenting with a pharyngeal dysphagia c/b incoordination of breathe-swallow pattern. Currently pt is presenting with an oropharyngeal dysphagia c/b incoordination of breathe-swallow pattern. Currently pt is presenting with an oropharyngeal dysphagia c/b incoordination of breathe-swallow pattern. Most notables on chewables and low viscosity fluids. S/sx of aspiration on thin and nectar thick liquids. Currently pt is presenting with an oropharyngeal dysphagia c/b incoordination of breathe-swallow pattern. Most notable on chewables and low viscosity fluids, s/sx of aspiration on thin and nectar thick liquids.

## 2020-04-07 NOTE — SWALLOW BEDSIDE ASSESSMENT ADULT - COMMENTS
Continued: Patient's BP eventually dropped and his breathing improved.  Patient reluctant to give any history ("I don't know why my granddaughter brought me here.  Talk to her. Just talk to her"). Chest x-ray: Extensive bilateral airspace opacities most suspicious for pneumonia, including viral infection. Superimposed edema is not excluded. Found to have pneumonia on CXR, possibly 2/2 COVID.  Nephrology consulted. Pt with electrolyte abnormalities such as hyponatremia, hyperkalemia.  Pt was placed on a regular solid/ thin liquids diet, however concern for aspiration event. As NPO status is not in line with the patients GOC, the diet was downgraded to D1/Honey thick liquids 4/5. Delayed cough; unable to determine if 2/2 baseline cough vs po. No coughing/throat clearing. Increase in respiratory effort during serial sip presentations. Improvement with breathe-swallow pattern via single sips. No coughing / throat clearing. No s/sx of aspiration. Increase in respiratory effort following oral phase/mastication of bolus. Concern for fatigue in addition to incoordination of breathe-swallow pattern placing the patient at risk for aspiration.

## 2020-04-07 NOTE — SWALLOW BEDSIDE ASSESSMENT ADULT - PHARYNGEAL PHASE
change in vocal quality/Delayed pharyngeal swallow/Throat clear post oral intake mild. Incoordination of breathe-swallow pattern c/b increase of respiratory effort following deglutition. Slight improvement with single sips vs serial./Delayed pharyngeal swallow Mild delay in the pharyngeal swallow response. Mild delay in pharyngeal swallow response. mild delay in pharyngeal swallow response.

## 2020-04-07 NOTE — PROGRESS NOTE ADULT - SUBJECTIVE AND OBJECTIVE BOX
CC: f/u for covid infection    Patient reports he is ok    REVIEW OF SYSTEMS:  All other review of systems negative (Comprehensive ROS)    Antimicrobials Day #  :    Other Medications Reviewed    T(F): 97.4 (04-07-20 @ 09:30), Max: 97.8 (04-06-20 @ 22:27)  HR: 72 (04-07-20 @ 09:30)  BP: 141/67 (04-07-20 @ 09:30)  RR: 18 (04-07-20 @ 09:30)  SpO2: 96% (04-07-20 @ 09:30)  Wt(kg): --    PHYSICAL EXAM:  General: alert, no acute distress  Eyes:  anicteric, no conjunctival injection, no discharge  Oropharynx: no lesions or injection 	  Neck: supple, without adenopathy  Lungs: poor effort to auscultation  Heart: regular rate and rhythm; no murmur, rubs or gallops  Abdomen: soft, nondistended, nontender, without mass or organomegaly  Skin: no lesions  Extremities: no clubbing, cyanosis, or edema  Neurologic: alert,  moves all extremities    LAB RESULTS:                        9.1    5.98  )-----------( 266      ( 07 Apr 2020 06:47 )             29.5     04-07    141  |  101  |  39<H>  ----------------------------<  117<H>  4.5   |  28  |  1.15    Ca    9.0      07 Apr 2020 06:43  Phos  3.3     04-07  Mg     2.2     04-07    TPro  7.1  /  Alb  2.7<L>  /  TBili  1.3<H>  /  DBili  x   /  AST  72<H>  /  ALT  43  /  AlkPhos  132<H>  04-06    LIVER FUNCTIONS - ( 06 Apr 2020 07:27 )  Alb: 2.7 g/dL / Pro: 7.1 g/dL / ALK PHOS: 132 U/L / ALT: 43 U/L / AST: 72 U/L / GGT: x         Ferritin, Serum: 795 ng/mL (04.07.20 @ 08:18)    Strongyloides Antibodies: Negative: No detectable levels of IgG antibodies to Strongyloides.  Repeat testing in 1-2 weeks if clinically indicated.  Test Performed by:  Formerly named [facility]  [address] [place] [address]  : Adonay Vasquez M.D. Ph.D.; CLIA# [id number] (04.05.20 @ 15:13)    D-Dimer Assay, Quantitative: 1700: D-Dimer result less than 230 ng/mL DDU correlates with the absence  of thrombosis in a patient with a low and moderate       pre-test probability of thrombosis.  1 DDU is approximately equal to  2 ng/mL FEU (previous units). ng/mL DDU (04.07.20 @ 06:47)        MICROBIOLOGY:  RECENT CULTURES:  COVID-19 PCR: Detected: This test has been validated by [employer] to be accurate;  though it has not been FDA cleared/approved by the usual pathway.  As with all laboratory tests, results should be correlated with clinical  findings. (03.31.20 @ 11:03)        RADIOLOGY REVIEWED:    < from: Xray Chest 1 View- PORTABLE-Routine (04.01.20 @ 06:50) >  EXAM:  XR CHEST PORTABLE ROUTINE 1V                                  PROCEDURE DATE:  04/01/2020          INTERPRETATION:  Chest portable.    Clinical History: Fever, pneumonia follow-up exam.    Comparison: 3/31/2020.    Single AP view submitted.    The evaluation of the cardiomediastinal silhouette is limited on portable technique.  Sternotomy. Prominent cardiac silhouette.  Aortic calcification.    Again noted are coarse reticular nodular interstitial markings bilaterally, with more confluent airspace consolidation in the periphery of the left mid to upper lung zone.    No significant pleural effusion is noted.    Impression:    Findings as discussed above.      < end of copied text >            Assessment:  Elderly man with covid, elevated inflammatory parameters but stable, high d dimer but on anticoagulation. he is oxygenating well on nasal cannula, finished plaquenil. Presently I would not advise anjuvant immunomodulators  Plan:  continue supportive care, pulmonary toilet

## 2020-04-07 NOTE — SWALLOW BEDSIDE ASSESSMENT ADULT - ORAL PHASE
Delayed oral transit time Delayed oral transit time/mild. May be due to initial distaste of bolus. Mild delay in a-p movement of the bolus, however effective. May be 2/2 initial distaste for bolus. prolonged a-p transport yet effective, which may have been 2/2 initial distaste of bolus Mildly prolonged mastication and a-p transport, however overall effective in nature.

## 2020-04-07 NOTE — PROGRESS NOTE ADULT - PROBLEM SELECTOR PLAN 2
off IV lasix, currently euvolemic   obtain 2d echo when able, Echo in Nov 2018 showed normal LV systolic function , mild AS , mild to moderate MR , moderate to severe TR , PASP 41   monitor I/O  Cr stable, lisinopril 10 mg daily started today (takes a 20 mg at home)   c/w toprol

## 2020-04-07 NOTE — PROGRESS NOTE ADULT - PROBLEM SELECTOR PLAN 1
2/2 to viral PNA from COVID 19+  s/p hydroxychloroquine x 5d, stable on nasal cannula 2L  ID/pulmonary consult noted   Escalate Covid therapy if deteriorates   fluid overload on admission, which has resolved  monitor CRP / ferritin /ddimer  Diet: d/w with dysphagia diet, pt s/p SLP consult, rec continue current diet

## 2020-04-07 NOTE — SWALLOW BEDSIDE ASSESSMENT ADULT - ORAL PREPARATORY PHASE
able to strip the bolus from the tsp Adequate spoon stripping. Adequate labial seal during cup drinking. adequate spoon stripping Able to bite banana

## 2020-04-07 NOTE — SWALLOW BEDSIDE ASSESSMENT ADULT - SWALLOW EVAL: RECOMMENDED FEEDING/EATING TECHNIQUES
position upright (90 degrees)/crush medication (when feasible)/no straws/small sips/bites/oral hygiene

## 2020-04-07 NOTE — SWALLOW BEDSIDE ASSESSMENT ADULT - SLP PERTINENT HISTORY OF CURRENT PROBLEM
Mr. Gupta is an 86M with CAD complicated by MI s/p CABG 2004, alpha-thalassemia, HTN/HLD, who presents with SOB; found to have acute hypoxemic respiratory failure 2/2 to COVID pna and fluid overload concern for acute on chronic chf of unknown eitology. COVID precautions. May be in heart failure as well.  Per notes, family say patient has been having worsening BLE edema, abdominal distension, and SOB over the past couple of months.  He was then recently started on lasix 20mg every other day.  Patient has been diuresing well but his BP was elevated and his lisinopril was increased from 10 to 20mg. Pt started to have an acute episode of SOB with desats.  Patient was brought here where he was found to be labored with SBP in the 200s.  Patient was given 2 SL NG and lasix 80mg IV x1.

## 2020-04-08 ENCOUNTER — TRANSCRIPTION ENCOUNTER (OUTPATIENT)
Age: 85
End: 2020-04-08

## 2020-04-08 LAB
ANION GAP SERPL CALC-SCNC: 12 MMOL/L — SIGNIFICANT CHANGE UP (ref 5–17)
BUN SERPL-MCNC: 37 MG/DL — HIGH (ref 7–23)
CALCIUM SERPL-MCNC: 9.5 MG/DL — SIGNIFICANT CHANGE UP (ref 8.4–10.5)
CHLORIDE SERPL-SCNC: 99 MMOL/L — SIGNIFICANT CHANGE UP (ref 96–108)
CO2 SERPL-SCNC: 29 MMOL/L — SIGNIFICANT CHANGE UP (ref 22–31)
CREAT SERPL-MCNC: 1.18 MG/DL — SIGNIFICANT CHANGE UP (ref 0.5–1.3)
CRP SERPL-MCNC: 14.67 MG/DL — HIGH (ref 0–0.4)
FERRITIN SERPL-MCNC: 764 NG/ML — HIGH (ref 30–400)
GLUCOSE SERPL-MCNC: 126 MG/DL — HIGH (ref 70–99)
HCT VFR BLD CALC: 31.6 % — LOW (ref 39–50)
HGB BLD-MCNC: 9.7 G/DL — LOW (ref 13–17)
MCHC RBC-ENTMCNC: 20.5 PG — LOW (ref 27–34)
MCHC RBC-ENTMCNC: 30.7 GM/DL — LOW (ref 32–36)
MCV RBC AUTO: 66.8 FL — LOW (ref 80–100)
NRBC # BLD: 0 /100 WBCS — SIGNIFICANT CHANGE UP (ref 0–0)
PLATELET # BLD AUTO: 326 K/UL — SIGNIFICANT CHANGE UP (ref 150–400)
POTASSIUM SERPL-MCNC: 5.1 MMOL/L — SIGNIFICANT CHANGE UP (ref 3.5–5.3)
POTASSIUM SERPL-SCNC: 5.1 MMOL/L — SIGNIFICANT CHANGE UP (ref 3.5–5.3)
PROCALCITONIN SERPL-MCNC: 0.11 NG/ML — HIGH (ref 0.02–0.1)
RBC # BLD: 4.73 M/UL — SIGNIFICANT CHANGE UP (ref 4.2–5.8)
RBC # FLD: 15.3 % — HIGH (ref 10.3–14.5)
SODIUM SERPL-SCNC: 140 MMOL/L — SIGNIFICANT CHANGE UP (ref 135–145)
WBC # BLD: 6.1 K/UL — SIGNIFICANT CHANGE UP (ref 3.8–10.5)
WBC # FLD AUTO: 6.1 K/UL — SIGNIFICANT CHANGE UP (ref 3.8–10.5)

## 2020-04-08 PROCEDURE — 99239 HOSP IP/OBS DSCHRG MGMT >30: CPT

## 2020-04-08 RX ORDER — FUROSEMIDE 40 MG
1 TABLET ORAL
Qty: 0 | Refills: 0 | DISCHARGE

## 2020-04-08 RX ORDER — METOPROLOL TARTRATE 50 MG
1 TABLET ORAL
Qty: 0 | Refills: 0 | DISCHARGE
Start: 2020-04-08

## 2020-04-08 RX ADMIN — LISINOPRIL 10 MILLIGRAM(S): 2.5 TABLET ORAL at 04:49

## 2020-04-08 RX ADMIN — PANTOPRAZOLE SODIUM 40 MILLIGRAM(S): 20 TABLET, DELAYED RELEASE ORAL at 04:49

## 2020-04-08 RX ADMIN — APIXABAN 5 MILLIGRAM(S): 2.5 TABLET, FILM COATED ORAL at 21:53

## 2020-04-08 RX ADMIN — Medication 50 MILLIGRAM(S): at 04:49

## 2020-04-08 RX ADMIN — APIXABAN 5 MILLIGRAM(S): 2.5 TABLET, FILM COATED ORAL at 10:07

## 2020-04-08 NOTE — DISCHARGE NOTE PROVIDER - NSDCFUADDAPPT_GEN_ALL_CORE_FT
follow up care as per subacute rehab facility protocol  follow up with primary care physician within one week after discharge

## 2020-04-08 NOTE — PROGRESS NOTE ADULT - ASSESSMENT
86M with CAD complicated by MI s/p CABG, alpha-thalassemia, HTN/HLD, who presents with SOB found to have acute hypoxemic respiratory failure 2/2 to COVID and fluid overload concern for acute on chronic chf of unknown etiology.

## 2020-04-08 NOTE — DISCHARGE NOTE PROVIDER - INSTRUCTIONS
cardiac healthy   dysphagia I pureed diet w/ thin liquids  fluid restriction 1000 cc/day  Ensure enlive twice daily

## 2020-04-08 NOTE — DISCHARGE NOTE NURSING/CASE MANAGEMENT/SOCIAL WORK - PATIENT PORTAL LINK FT
You can access the FollowMyHealth Patient Portal offered by [facility] by registering at the following website: [email or web address]. By joining [facility]’s FollowMyHealth portal, you will also be able to view your health information using other applications (apps) compatible with our system.

## 2020-04-08 NOTE — DISCHARGE NOTE PROVIDER - CARE PROVIDER_API CALL
Lyubov Sapp)  [facility]ospital[facility]  Adult  [address] [place] [address]  Phone: [phone]  Fax: [phone]  Follow Up Time:

## 2020-04-08 NOTE — PROGRESS NOTE ADULT - PROBLEM SELECTOR PLAN 1
2/2 to viral PNA from COVID 19+  s/p hydroxychloroquine x 5d, stable on nasal cannula 1L  ID/pulmonary consult noted   Escalate Covid therapy if deteriorates   fluid overload on admission, which has resolved  monitor CRP / ferritin /ddimer  Diet: d/w with dysphagia diet, pt s/p SLP consult, rec continue current diet

## 2020-04-08 NOTE — PROGRESS NOTE ADULT - PROBLEM SELECTOR PROBLEM 2
Atrial fibrillation, unspecified type
Acute systolic congestive heart failure
SARS-associated coronavirus infection
SARS-associated coronavirus infection

## 2020-04-08 NOTE — PROGRESS NOTE ADULT - REASON FOR ADMISSION
SOB, desats
COVID-19, hypoxic resp failure
SOB, desats

## 2020-04-08 NOTE — DISCHARGE NOTE PROVIDER - REASON FOR ADMISSION
SOB, desats - COVID 19 positive, dysphagia w/ possible aspiration - dysphagia puree diet as per swallow evaluation, thin liquids as per goals of care, hyponatremia resolved, acute kidney injury resolved, chronic atrial fib w/ Eliquis

## 2020-04-08 NOTE — PROGRESS NOTE ADULT - ATTENDING COMMENTS
skye singh who will discuss with family
Plan to DC once decision is made about location.        Edwige payton   [facility]

## 2020-04-08 NOTE — DISCHARGE NOTE PROVIDER - NSDCMRMEDTOKEN_GEN_ALL_CORE_FT
Eliquis 5 mg oral tablet: 1 tab(s) orally every 12 hours  lisinopril 10 mg oral tablet: 2 tab(s) orally once a day  metoprolol succinate 50 mg oral tablet, extended release: 1 tab(s) orally once a day  simvastatin 80 mg oral tablet: 1 tab(s) orally once a day (at bedtime)

## 2020-04-08 NOTE — DISCHARGE NOTE PROVIDER - NSDCCPCAREPLAN_GEN_ALL_CORE_FT
PRINCIPAL DISCHARGE DIAGNOSIS  Diagnosis: Acute respiratory failure with hypoxia  Assessment and Plan of Treatment: Acute respiratory failure with hypoxia  nasal cannula oxygen 2L/min in setting of COVID - titrate slowly to off      SECONDARY DISCHARGE DIAGNOSES  Diagnosis: CAD (coronary artery disease)  Assessment and Plan of Treatment: CAD (coronary artery disease)  Coronary artery disease is a condition where the arteries the supply the heart muscle get clogges with fatty deposits & puts you at risk for a heart attack  Call your doctor if you have any new pain, pressure, or discomfort in the center of your chest, pain, tingling or discomfort in arms, back, neck, jaw, or stomach, shortness of breath, nausea, vomiting, burping or heartburn, sweating, cold and clammy skin, racing or abnormal heartbeat for more than 10 minutes or if they keep coming & going.  Call 911 and do not tr to get to hospital by care  You can help yourself with lefestyle changes (quitting smoking if you smoke), eat lots of fruits & vegetables & low fat dairy products, not a lot of meat & fatty foods, walk or some form of physical activity most days of the week, lose weight if you are overweight  Take your cardiac medication as prescribed to lower cholesterol, to lower blood pressure, aspirin to prevent blood clots, and diabetes control  Make sure to keep appointments with doctor for cardiac follow up care      Diagnosis: ILYA (acute kidney injury)  Assessment and Plan of Treatment: ILYA (acute kidney injury)  Avoid taking (NSAIDs) - (ex: Ibuprofen, Advil, Celebrex, Naprosyn)  Avoid taking any nephrotoxic agents (can harm kidneys) - Intravenous contrast for diagnostic testing, combination cold medications.  Have all medications adjusted for your renal function by your Health Care Provider.  Blood pressure control is important.  Take all medication as prescribed.      Diagnosis: Atrial fibrillation, unspecified type  Assessment and Plan of Treatment: Atrial fibrillation, unspecified type  Atrial fibrillation is the most common heart rhythm problem & has the risk of stroke & heart attack  It helps if you control your blood pressure, not drink more than 1-2 alcohol drinks per day, cut down on caffeine, getting treatment for over active thyroid gland, & getting exercise  Call your doctor if you feel your heart racing or beating unusually, chest tightness or pain, lightheaded, faint, shortness of breath especially with exercise  It is important to take your heart medication as prescribed  You are on Eliquis for anticoagulation and stroke prevention  Eliquis/Apixaban is used to thin the blood so clots will not form and to keep existing ones from getting bigger.  Take this medication daily as prescribed by your health care provider.  Take this medication with food to prevent upset stomach.  If you miss a dose call your health care provider or pharmacist right away.  Tell your doctor you use this drug before you have a spinal or epidural procedure  Tell dentists, surgeon, and other doctors that you use this drug.  You may bleed more easily.  Be careful and avoid injury.  Use a soft toothbrush and an electric razor.      Diagnosis: SARS-associated coronavirus infection  Assessment and Plan of Treatment: SARS-associated coronavirus infection  You tested positive for COVID 19.  You no longer require hospitalization.  Please restrict activities outside of your home except for getting medical care.  Do not go to work, school, or public areas.  Avoid using public transportation, ride-sharing, or taxis.  Separate yourself from other people and animals in your home.  Call ahead before visiting your doctor.  Wear a facemask when you are around other people. Cover your cough and sneezes.  Clean your hands often.  Avoid sharing personal household items.  Clean all frequently touched surfaces daily.      Diagnosis: HTN (hypertension)  Assessment and Plan of Treatment: HTN (hypertension)  Follow up with your medical doctor to establish long term blood pressure treatment goals.  on Lisinopril 10 QD - monitor BP and treat accordingly      Diagnosis: Acute on chronic combined systolic and diastolic congestive heart failure  Assessment and Plan of Treatment: Weigh yourself daily.  If you gain 3lbs in 3 days, or 5lbs in a week call your Health Care Provider.  Do not eat or drink foods containing more than 2000mg of salt (sodium) in your diet every day.  Call your Health Care Provider if you have any swelling or increased swelling in your feet, ankles, and/or stomach.  Take all of your medication as directed.  If you become dizzy call your Health Care Provider.  pt was on Lasix 20 daily PTA & admitted w/ ILYA in setting of COVID - assess if patient needs to restart

## 2020-04-08 NOTE — PROGRESS NOTE ADULT - SUBJECTIVE AND OBJECTIVE BOX
Patient is a 86y old  Male who presents with a chief complaint of SOB, desats - COVID 19 positive, dysphagia w/ possible aspiration - dysphagia puree diet as per swallow evaluation, thin liquids as per goals of care, hyponatremia resolved, acute kidney injury resolved, chronic atrial fib w/ Eliquis (08 Apr 2020 11:22)      SUBJECTIVE / OVERNIGHT EVENTS:  SpO2: 90% (08 Apr 2020 11:54) (90% - 94%) , stable on 1 L NC , no complaints       ADDITIONAL REVIEW OF SYSTEMS: Negative except for above    MEDICATIONS  (STANDING):  apixaban 5 milliGRAM(s) Oral two times a day  lisinopril 10 milliGRAM(s) Oral daily  metoprolol succinate ER 50 milliGRAM(s) Oral daily  pantoprazole    Tablet 40 milliGRAM(s) Oral before breakfast    MEDICATIONS  (PRN):  acetaminophen   Tablet .. 650 milliGRAM(s) Oral every 4 hours PRN Temp greater or equal to 38.5C (101.3F)  acetaminophen  Suppository .. 650 milliGRAM(s) Rectal every 4 hours PRN Temp greater or equal to 38.5C (101.3F)      CAPILLARY BLOOD GLUCOSE        I&O's Summary    07 Apr 2020 07:01  -  08 Apr 2020 07:00  --------------------------------------------------------  IN: 600 mL / OUT: 150 mL / NET: 450 mL    08 Apr 2020 07:01  -  08 Apr 2020 19:53  --------------------------------------------------------  IN: 120 mL / OUT: 0 mL / NET: 120 mL        PHYSICAL EXAM:  Vital Signs Last 24 Hrs  T(C): 36.4 (08 Apr 2020 11:54), Max: 37.2 (07 Apr 2020 21:02)  T(F): 97.5 (08 Apr 2020 11:54), Max: 99 (07 Apr 2020 21:02)  HR: 74 (08 Apr 2020 11:54) (74 - 108)  BP: 135/62 (08 Apr 2020 11:54) (135/62 - 162/79)  BP(mean): --  RR: 18 (08 Apr 2020 11:54) (18 - 19)  SpO2: 90% (08 Apr 2020 11:54) (90% - 94%)    PHYSICAL EXAM:  GENERAL: NAD, well-developed  NECK: Supple, No JVD  CHEST/LUNG: Clear to auscultation bilaterally; No wheeze  HEART: Regular rate and rhythm; No murmurs, rubs, or gallops  ABDOMEN: Soft, Nontender, Nondistended; Bowel sounds present  EXTREMITIES:  2+ Peripheral Pulses, No clubbing, cyanosis, or edema  PSYCH: AAwake   NEUROLOGY: non-focal        LABS:                        9.7    6.10  )-----------( 326      ( 08 Apr 2020 05:30 )             31.6     04-08    140  |  99  |  37<H>  ----------------------------<  126<H>  5.1   |  29  |  1.18    Ca    9.5      08 Apr 2020 05:35  Phos  3.3     04-07  Mg     2.2     04-07                  RADIOLOGY & ADDITIONAL TESTS:    Imaging Personally Reviewed:    Electrocardiogram Personally Reviewed:    COORDINATION OF CARE:  Care Discussed with Consultants/Other Providers [Y/N]:  Prior or Outpatient Records Reviewed [Y/N]:

## 2020-04-08 NOTE — PROGRESS NOTE ADULT - PROBLEM SELECTOR PROBLEM 4
Accelerated hypertension
HTN (hypertension)
HTN (hypertension)
ILYA (acute kidney injury)

## 2020-04-08 NOTE — CHART NOTE - NSCHARTNOTEFT_GEN_A_CORE
Spoke to granddaughter Dr. Irwin - HCP    wants thin liquids as per goals of care for patient - understands risk of aspiration and potential complications    also understands that repeat COVID repeat testing not done routinely - will do if [facility] insists on it for transfer - she will speak to pt's son Raffaele Crabtree regarding this

## 2020-04-08 NOTE — DISCHARGE NOTE PROVIDER - HOSPITAL COURSE
SOB, desats - COVID 19 positive, dysphagia w/ possible aspiration - dysphagia puree diet as per swallow evaluation, thin liquids as per goals of care, hyponatremia resolved, acute kidney injury resolved, chronic atrial fib w/ Eliquis        86M with CAD complicated by MI s/p CABG, alpha-thalassemia, HTN/HLD, who presents with SOB found to have acute hypoxemic respiratory failure 2/2 to COVID and fluid overload concern for acute on chronic chf of unknown etiology         Problem/Plan - 1:    ·  Problem: Acute hypoxemic respiratory failure.  Plan: 2/2 to viral PNA from COVID 19+    s/p hydroxychloroquine x 5d, stable on nasal cannula 2L    ID/pulmonary consult noted     Escalate Covid therapy if deteriorates     fluid overload on admission, which has resolved    monitor CRP / ferritin /ddimer    Diet: d/w with dysphagia diet, pt s/p SLP consult, rec continue current diet.          Problem/Plan - 2:    ·  Problem: Acute systolic congestive heart failure.  Plan: off IV lasix, currently euvolemic     obtain 2d echo when able, Echo in Nov 2018 showed normal LV systolic function , mild AS , mild to moderate MR , moderate to severe TR , PASP 41     monitor I/O    Cr stable, lisinopril 10 mg daily started today (takes a 20 mg at home)     c/w toprol.          Problem/Plan - 3:    ·  Problem: Atrial fibrillation, unspecified type.  Plan: cont outpt elquis at 5 mg bid.    c/w toprol.          Problem/Plan - 4:    ·  Problem: ILYA (acute kidney injury).  Plan: hold aceI given recent ilya on ckd stage III;     Cr: 1.15 today     lisinopril started today.          Problem/Plan - 5:    ·  Problem: HTN (hypertension).  Plan: hold aceI given recent ilya on ckd stage III    BP stable.

## 2020-04-09 VITALS
RESPIRATION RATE: 18 BRPM | OXYGEN SATURATION: 91 % | SYSTOLIC BLOOD PRESSURE: 153 MMHG | TEMPERATURE: 98 F | HEART RATE: 87 BPM | DIASTOLIC BLOOD PRESSURE: 70 MMHG

## 2020-04-09 PROCEDURE — 87635 SARS-COV-2 COVID-19 AMP PRB: CPT

## 2020-04-09 PROCEDURE — 93005 ELECTROCARDIOGRAM TRACING: CPT

## 2020-04-09 PROCEDURE — 80048 BASIC METABOLIC PNL TOTAL CA: CPT

## 2020-04-09 PROCEDURE — 71045 X-RAY EXAM CHEST 1 VIEW: CPT

## 2020-04-09 PROCEDURE — 86682 HELMINTH ANTIBODY: CPT

## 2020-04-09 PROCEDURE — 85027 COMPLETE CBC AUTOMATED: CPT

## 2020-04-09 PROCEDURE — 83930 ASSAY OF BLOOD OSMOLALITY: CPT

## 2020-04-09 PROCEDURE — 82550 ASSAY OF CK (CPK): CPT

## 2020-04-09 PROCEDURE — 97161 PT EVAL LOW COMPLEX 20 MIN: CPT

## 2020-04-09 PROCEDURE — 84100 ASSAY OF PHOSPHORUS: CPT

## 2020-04-09 PROCEDURE — 82553 CREATINE MB FRACTION: CPT

## 2020-04-09 PROCEDURE — 83615 LACTATE (LD) (LDH) ENZYME: CPT

## 2020-04-09 PROCEDURE — 80053 COMPREHEN METABOLIC PANEL: CPT

## 2020-04-09 PROCEDURE — 97110 THERAPEUTIC EXERCISES: CPT

## 2020-04-09 PROCEDURE — 99285 EMERGENCY DEPT VISIT HI MDM: CPT | Mod: 25

## 2020-04-09 PROCEDURE — 85379 FIBRIN DEGRADATION QUANT: CPT

## 2020-04-09 PROCEDURE — 83880 ASSAY OF NATRIURETIC PEPTIDE: CPT

## 2020-04-09 PROCEDURE — 36415 COLL VENOUS BLD VENIPUNCTURE: CPT

## 2020-04-09 PROCEDURE — 83605 ASSAY OF LACTIC ACID: CPT

## 2020-04-09 PROCEDURE — 84484 ASSAY OF TROPONIN QUANT: CPT

## 2020-04-09 PROCEDURE — 84145 PROCALCITONIN (PCT): CPT

## 2020-04-09 PROCEDURE — 82962 GLUCOSE BLOOD TEST: CPT

## 2020-04-09 PROCEDURE — 84550 ASSAY OF BLOOD/URIC ACID: CPT

## 2020-04-09 PROCEDURE — 83735 ASSAY OF MAGNESIUM: CPT

## 2020-04-09 PROCEDURE — 86140 C-REACTIVE PROTEIN: CPT

## 2020-04-09 PROCEDURE — 97116 GAIT TRAINING THERAPY: CPT

## 2020-04-09 PROCEDURE — 92610 EVALUATE SWALLOWING FUNCTION: CPT

## 2020-04-09 PROCEDURE — 82728 ASSAY OF FERRITIN: CPT

## 2020-04-09 PROCEDURE — 81001 URINALYSIS AUTO W/SCOPE: CPT

## 2020-04-14 ENCOUNTER — INPATIENT (INPATIENT)
Facility: HOSPITAL | Age: 85
LOS: 6 days | Discharge: HOME | DRG: 177 | End: 2020-04-21
Attending: FAMILY MEDICINE | Admitting: INTERNAL MEDICINE
Payer: COMMERCIAL

## 2020-04-14 VITALS
HEART RATE: 54 BPM | RESPIRATION RATE: 20 BRPM | SYSTOLIC BLOOD PRESSURE: 124 MMHG | DIASTOLIC BLOOD PRESSURE: 71 MMHG | OXYGEN SATURATION: 100 %

## 2020-04-14 DIAGNOSIS — R41.82 ALTERED MENTAL STATUS, UNSPECIFIED: ICD-10-CM

## 2020-04-14 LAB
ALBUMIN SERPL ELPH-MCNC: 2.6 G/DL — LOW (ref 3.3–5)
ALP SERPL-CCNC: 105 U/L — SIGNIFICANT CHANGE UP (ref 40–120)
ALT FLD-CCNC: 25 U/L — SIGNIFICANT CHANGE UP (ref 10–45)
ANION GAP SERPL CALC-SCNC: 13 MMOL/L — SIGNIFICANT CHANGE UP (ref 5–17)
ANISOCYTOSIS BLD QL: SLIGHT — SIGNIFICANT CHANGE UP
APPEARANCE UR: CLEAR — SIGNIFICANT CHANGE UP
APTT BLD: 36.5 SEC — HIGH (ref 27.5–36.3)
AST SERPL-CCNC: 39 U/L — SIGNIFICANT CHANGE UP (ref 10–40)
BACTERIA # UR AUTO: NEGATIVE — SIGNIFICANT CHANGE UP
BASOPHILS # BLD AUTO: 0.03 K/UL — SIGNIFICANT CHANGE UP (ref 0–0.2)
BASOPHILS NFR BLD AUTO: 0.4 % — SIGNIFICANT CHANGE UP (ref 0–2)
BILIRUB SERPL-MCNC: 2 MG/DL — HIGH (ref 0.2–1.2)
BILIRUB UR-MCNC: NEGATIVE — SIGNIFICANT CHANGE UP
BUN SERPL-MCNC: 48 MG/DL — HIGH (ref 7–23)
BURR CELLS BLD QL SMEAR: PRESENT — SIGNIFICANT CHANGE UP
CALCIUM SERPL-MCNC: 9.3 MG/DL — SIGNIFICANT CHANGE UP (ref 8.4–10.5)
CHLORIDE SERPL-SCNC: 105 MMOL/L — SIGNIFICANT CHANGE UP (ref 96–108)
CO2 SERPL-SCNC: 22 MMOL/L — SIGNIFICANT CHANGE UP (ref 22–31)
COLOR SPEC: YELLOW — SIGNIFICANT CHANGE UP
CREAT SERPL-MCNC: 1.37 MG/DL — HIGH (ref 0.5–1.3)
DIFF PNL FLD: NEGATIVE — SIGNIFICANT CHANGE UP
ELLIPTOCYTES BLD QL SMEAR: SLIGHT — SIGNIFICANT CHANGE UP
EOSINOPHIL # BLD AUTO: 0.04 K/UL — SIGNIFICANT CHANGE UP (ref 0–0.5)
EOSINOPHIL NFR BLD AUTO: 0.5 % — SIGNIFICANT CHANGE UP (ref 0–6)
EPI CELLS # UR: 5 /HPF — SIGNIFICANT CHANGE UP
GAS PNL BLDA: SIGNIFICANT CHANGE UP
GLUCOSE SERPL-MCNC: 95 MG/DL — SIGNIFICANT CHANGE UP (ref 70–99)
GLUCOSE UR QL: NEGATIVE — SIGNIFICANT CHANGE UP
HCT VFR BLD CALC: 33.1 % — LOW (ref 39–50)
HGB BLD-MCNC: 9.8 G/DL — LOW (ref 13–17)
HYALINE CASTS # UR AUTO: 0 /LPF — SIGNIFICANT CHANGE UP (ref 0–7)
HYPOCHROMIA BLD QL: SLIGHT — SIGNIFICANT CHANGE UP
IMM GRANULOCYTES NFR BLD AUTO: 1.4 % — SIGNIFICANT CHANGE UP (ref 0–1.5)
INR BLD: 2.49 RATIO — HIGH (ref 0.88–1.16)
KETONES UR-MCNC: ABNORMAL
LEUKOCYTE ESTERASE UR-ACNC: NEGATIVE — SIGNIFICANT CHANGE UP
LYMPHOCYTES # BLD AUTO: 0.74 K/UL — LOW (ref 1–3.3)
LYMPHOCYTES # BLD AUTO: 10 % — LOW (ref 13–44)
MACROCYTES BLD QL: SLIGHT — SIGNIFICANT CHANGE UP
MANUAL SMEAR VERIFICATION: SIGNIFICANT CHANGE UP
MCHC RBC-ENTMCNC: 20.7 PG — LOW (ref 27–34)
MCHC RBC-ENTMCNC: 29.6 GM/DL — LOW (ref 32–36)
MCV RBC AUTO: 69.8 FL — LOW (ref 80–100)
MICROCYTES BLD QL: SIGNIFICANT CHANGE UP
MONOCYTES # BLD AUTO: 0.53 K/UL — SIGNIFICANT CHANGE UP (ref 0–0.9)
MONOCYTES NFR BLD AUTO: 7.2 % — SIGNIFICANT CHANGE UP (ref 2–14)
NEUTROPHILS # BLD AUTO: 5.96 K/UL — SIGNIFICANT CHANGE UP (ref 1.8–7.4)
NEUTROPHILS NFR BLD AUTO: 80.5 % — HIGH (ref 43–77)
NITRITE UR-MCNC: NEGATIVE — SIGNIFICANT CHANGE UP
NRBC # BLD: 0 /100 WBCS — SIGNIFICANT CHANGE UP (ref 0–0)
PH UR: 5.5 — SIGNIFICANT CHANGE UP (ref 5–8)
PLAT MORPH BLD: NORMAL — SIGNIFICANT CHANGE UP
PLATELET # BLD AUTO: 376 K/UL — SIGNIFICANT CHANGE UP (ref 150–400)
POIKILOCYTOSIS BLD QL AUTO: SLIGHT — SIGNIFICANT CHANGE UP
POLYCHROMASIA BLD QL SMEAR: SLIGHT — SIGNIFICANT CHANGE UP
POTASSIUM SERPL-MCNC: 5.3 MMOL/L — SIGNIFICANT CHANGE UP (ref 3.5–5.3)
POTASSIUM SERPL-SCNC: 5.3 MMOL/L — SIGNIFICANT CHANGE UP (ref 3.5–5.3)
PROT SERPL-MCNC: 8.6 G/DL — HIGH (ref 6–8.3)
PROT UR-MCNC: ABNORMAL
PROTHROM AB SERPL-ACNC: 29.4 SEC — HIGH (ref 10–12.9)
RBC # BLD: 4.74 M/UL — SIGNIFICANT CHANGE UP (ref 4.2–5.8)
RBC # FLD: 17.1 % — HIGH (ref 10.3–14.5)
RBC BLD AUTO: ABNORMAL
RBC CASTS # UR COMP ASSIST: 1 /HPF — SIGNIFICANT CHANGE UP (ref 0–4)
SCHISTOCYTES BLD QL AUTO: SLIGHT — SIGNIFICANT CHANGE UP
SODIUM SERPL-SCNC: 140 MMOL/L — SIGNIFICANT CHANGE UP (ref 135–145)
SP GR SPEC: 1.02 — SIGNIFICANT CHANGE UP (ref 1.01–1.02)
TARGETS BLD QL SMEAR: SLIGHT — SIGNIFICANT CHANGE UP
UROBILINOGEN FLD QL: ABNORMAL
WBC # BLD: 7.4 K/UL — SIGNIFICANT CHANGE UP (ref 3.8–10.5)
WBC # FLD AUTO: 7.4 K/UL — SIGNIFICANT CHANGE UP (ref 3.8–10.5)
WBC UR QL: 1 /HPF — SIGNIFICANT CHANGE UP (ref 0–5)

## 2020-04-14 PROCEDURE — 70450 CT HEAD/BRAIN W/O DYE: CPT | Mod: 26

## 2020-04-14 PROCEDURE — 99285 EMERGENCY DEPT VISIT HI MDM: CPT

## 2020-04-14 PROCEDURE — 71045 X-RAY EXAM CHEST 1 VIEW: CPT | Mod: 26

## 2020-04-14 PROCEDURE — 99233 SBSQ HOSP IP/OBS HIGH 50: CPT

## 2020-04-14 RX ORDER — ACETAMINOPHEN 500 MG
650 TABLET ORAL ONCE
Refills: 0 | Status: COMPLETED | OUTPATIENT
Start: 2020-04-14 | End: 2020-04-14

## 2020-04-14 RX ORDER — SODIUM CHLORIDE 9 MG/ML
250 INJECTION INTRAMUSCULAR; INTRAVENOUS; SUBCUTANEOUS ONCE
Refills: 0 | Status: COMPLETED | OUTPATIENT
Start: 2020-04-14 | End: 2020-04-14

## 2020-04-14 RX ORDER — MORPHINE SULFATE 50 MG/1
2 CAPSULE, EXTENDED RELEASE ORAL ONCE
Refills: 0 | Status: DISCONTINUED | OUTPATIENT
Start: 2020-04-14 | End: 2020-04-15

## 2020-04-14 RX ADMIN — SODIUM CHLORIDE 250 MILLILITER(S): 9 INJECTION INTRAMUSCULAR; INTRAVENOUS; SUBCUTANEOUS at 18:25

## 2020-04-14 RX ADMIN — Medication 650 MILLIGRAM(S): at 19:52

## 2020-04-14 NOTE — H&P ADULT - PROBLEM SELECTOR PLAN 6
- monitor Cr, avoid nephrotoxic medications - holding ACEI given BPs acceptable currently; reinstate PRN

## 2020-04-14 NOTE — ED PROVIDER NOTE - PHYSICAL EXAMINATION
Gen: alert, arousable to voice, following commands   Skin: No rashes or lesions  HEENT: NC/AT, PERRLA, EOMI, dry mucosa  Resp: mildly tachypneic, baibasilar rales  Cardiac: tachycardic +murmur  GI: ND, +BS, Soft, NT  Ext: no pedal edema, FROM in all extremities  Neuro: no focal deficits, following commands, limited exam

## 2020-04-14 NOTE — ED ADULT NURSE REASSESSMENT NOTE - NS ED NURSE REASSESS COMMENT FT1
2 RNs at bedside maintaining sterile technique for straight cath. Patient tolerated well, UC/UA collected sent to lab.

## 2020-04-14 NOTE — ED ADULT NURSE NOTE - NSIMPLEMENTINTERV_GEN_ALL_ED
Implemented All Fall Risk Interventions:  [place] to call system. Call bell, personal items and telephone within reach. Instruct patient to call for assistance. Room bathroom lighting operational. Non-slip footwear when patient is off stretcher. Physically safe environment: no spills, clutter or unnecessary equipment. Stretcher in lowest position, wheels locked, appropriate side rails in place. Provide visual cue, wrist band, yellow gown, etc. Monitor gait and stability. Monitor for mental status changes and reorient to person, place, and time. Review medications for side effects contributing to fall risk. Reinforce activity limits and safety measures with patient and family. Implemented All Fall with Harm Risk Interventions:  [place] to call system. Call bell, personal items and telephone within reach. Instruct patient to call for assistance. Room bathroom lighting operational. Non-slip footwear when patient is off stretcher. Physically safe environment: no spills, clutter or unnecessary equipment. Stretcher in lowest position, wheels locked, appropriate side rails in place. Provide visual cue, wrist band, yellow gown, etc. Monitor gait and stability. Monitor for mental status changes and reorient to person, place, and time. Review medications for side effects contributing to fall risk. Reinforce activity limits and safety measures with patient and family. Provide visual clues: red socks.

## 2020-04-14 NOTE — ED ADULT NURSE NOTE - OBJECTIVE STATEMENT
85 yo male with PMH of COPD and HTN presents to the ED via EMS from nursing home complaining of SOB. Per EMS, patient was sating in the 70s on 2L of O2 (normally uses 2L at nursing home due to COPD). When EMS arrived, placed patient on NRB, was sating between %. EMS reports they were told that patient was becoming more lethargic. Patient has DNH, DNR, DNI. Per EMS, family rescinded DNH and wanted patient to be transported to ER for evaluation. Patient appears comfortable, switched over to NC 5L sating at 96%. Attempting to contact family at this time. 87 yo male with PMH of COPD, HTN, stroke, MI presents to the ED via EMS from nursing home complaining of SOB. Patient was here 5 days ago for covid. Per EMS, patient was sating in the 70s on 2L of O2 (normally uses 2L at nursing home due to COPD). When EMS arrived, placed patient on NRB, was sating between %. EMS reports they were told that patient was becoming more lethargic. Patient has DNH, DNR, DNI. Per EMS, family rescinded DNH and wanted patient to be transported to ER for evaluation. Patient appears comfortable, switched over to NC 5L sating at 96%. Granddaughter is an MD at this hospital. Per granddaughter, had encephalopathy 24 hours ago. Granddaughter received phonecall from facility that patient was having "slurred speech" this morning. Usually A&Ox3, independent ADLs per granddaughter. Patient is following commands at this time, non-verbal at this time.

## 2020-04-14 NOTE — H&P ADULT - PROBLEM SELECTOR PLAN 3
- on CTH, pontine lacunar infarct of undetermined etiology  - will treat with ASA 81 for now  - further GOC discussion with family  - per family: would like palliative c/s for further GOC and planning (considering hospice inpt vs. home?) Currently DNR/DNI  - S&S and PT casieal - s/p plaquinel on prior admission  - was desaturating to 80s per chart in rehab  - currently saturating well on 4L NC  - could consider steroids or anakinra (renally dosed) if no further improvement  - c/w eliquis full dose for AC  - trend CRP, ferritin, d-dimer, LDH, procalcitonin - s/p plaquinel on prior admission  - was desaturating to 80s per chart in rehab  - currently saturating well on 4L NC  - could consider steroids or anakinra (renally dosed) if no further improvement  - c/w eliquis full dose for AC  - trend CRP, ferritin, d-dimer, LDH, procalcitonin  - if worsening, will get CT chest

## 2020-04-14 NOTE — H&P ADULT - ATTENDING COMMENTS
I agree with the above history, physical, and plan which I have reviewed and edited where appropriate.     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  Hypoxic respiratory failure likely 2/2 to COVID infection, however, cannot rule out fluid overload; check BNP, check correct weight, monitor ins/outs, reassess volume status in AM, may consider giving IV lasix (previously prior to last admission, patient was on lasix 20mg).  Not likely superimposed bacterial pneumonia, will check procalcitonin;  If not improving, consider CT chest.  Defer steroid or anakinra to day team for now. I agree with the above history, physical, and plan which I have reviewed and edited where appropriate.     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  Hypoxic respiratory failure likely 2/2 to COVID infection, however, cannot rule out fluid overload; check BNP, check correct weight, monitor ins/outs, reassess volume status in AM, may consider giving IV lasix (previously prior to last admission, patient was on lasix 20mg).  Not likely superimposed bacterial pneumonia, will check procalcitonin;  If not improving, consider CT chest.  Defer steroid or anakinra to day team for now.    Principal diagnosis:   J12.89 Pneumonia due to 2019 novel coronavirus  J96.01 Acute respiratory failure with hypoxia    CPT Code: 69622 I agree with the above history, physical, and plan which I have reviewed and edited where appropriate.     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  Hypoxic respiratory failure likely 2/2 to COVID infection, however, cannot rule out fluid overload, however CXR worse compared to previous.  Also during previous admission, patient received IV lasix for suspected acute on chronic heart failure.  May have underlying pulmonary fibrosis per previously documented Pulm note.  Will check BNP, check correct weight, monitor ins/outs, Give trial of 1 dose IV lasix 40mg and reassess volume status in AM.  Also, not likely superimposed bacterial pneumonia, will check procalcitonin;  If not improving, consider CT chest.  Defer steroid or anakinra to day team for now.    Principal diagnosis:   J12.89 Pneumonia due to 2019 novel coronavirus  J96.01 Acute respiratory failure with hypoxia    CPT Code: 88613

## 2020-04-14 NOTE — H&P ADULT - PROBLEM SELECTOR PLAN 1
- suspect 2/2 hypoxia and tachypnea/respiratory failure in setting of recent COVID  - also with age-indeterminate pontine infarct on CT head  - will treat infarct with ASA  - treat COVID as below  - recent TSH wnl, will check B12, folate, RPR for acute encephalopathy - suspect 2/2 hypoxia and tachypnea/respiratory failure in setting of recent COVID  - also with age-indeterminate pontine infarct on CT head  - consider adding ASA 81 once patient recovers from COVID for stroke  - treat COVID as below  - recent TSH wnl, will check B12, folate, RPR for acute encephalopathy - suspect 2/2 hypoxia and tachypnea/respiratory failure in setting of recent COVID  - also with age-indeterminate pontine infarct on CT head; no focal neurological deficit  - consider adding ASA 81 once patient recovers from COVID   - treat COVID as below  - recent TSH wnl, will check B12, folate, RPR for acute encephalopathy

## 2020-04-14 NOTE — ED PROVIDER NOTE - OBJECTIVE STATEMENT
85 yo male with PMHx of MI s/p CABG, alpha-thalassemia, HTN/HLD, Afib on eliquis, CVA p/w AMS.  Patient unable to provide hx.  Patient's granddaughter provides history.  Patient recently discharged from [facility] 5 day ago after being found to have COVID.  Upon discharge patient was AAO x 3 and able to perform ADLs.  Only requiring 2L of supplemental oxygen via nasal canula.  over the past few days, patient has become increasingly SOB.  over the past 24hrs patient has become confused, lethargic and nonverbal.  Today, O2 sat found to be in the mid 80s on 6L NC.  Patient placed on Nonrebreather by EMS with improvement of Sats.

## 2020-04-14 NOTE — H&P ADULT - PROBLEM SELECTOR PLAN 9
- PCP: unkown  - Dispo: back to rehab pending PT vs. per family wishes IMPROVE >2  - DVT: eliquis BID  - Diet: Dysphagia I with fluid restriction for possible CHF; S&S eval  - Dispo: back to rehab pending PT vs. per family wishes

## 2020-04-14 NOTE — H&P ADULT - NSHPPHYSICALEXAM_GEN_ALL_CORE
PHYSICAL EXAM:    Vital Signs Last 24 Hrs  T(C): 36.3 (14 Apr 2020 23:27), Max: 36.8 (14 Apr 2020 17:35)  T(F): 97.4 (14 Apr 2020 23:27), Max: 98.2 (14 Apr 2020 17:35)  HR: 82 (14 Apr 2020 23:27) (54 - 82)  BP: 142/79 (14 Apr 2020 23:27) (124/71 - 154/61)  BP(mean): --  RR: 20 (14 Apr 2020 23:27) (18 - 24)  SpO2: 99% (14 Apr 2020 23:27) (98% - 100%)    General: tachypneic with some labored breathing while talking. following commands  HEENT: NCAT.  PERRL.  EOMI.  No scleral icterus or injection.  Moist MM.  No oropharyngeal exudates.    Neck: Supple.  Full ROM.  No JVD.  No thyromegaly. No lymphadenopathy.   Heart: irregular rhythm, rate controlled  Lungs: diffuse fine rales  Abdomen: BS+, soft, NT/ND.  No organomegaly.  Skin: Warm and dry.  No rashes.  Extremities: No edema, clubbing, or cyanosis.  2+ peripheral pulses b/l.  Musculoskeletal: No deformities.  No spinal or paraspinal tenderness.  Neuro: A&Ox3.  CN II-XII intact. diffuse bilateral LE weakness. normal upper extremity strength. Following simple commands and questions

## 2020-04-14 NOTE — H&P ADULT - ASSESSMENT
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

## 2020-04-14 NOTE — H&P ADULT - PROBLEM SELECTOR PLAN 8
IMPROVE >2  - DVT: eliquis BID  - Diet: Dysphagia I with fluid restriction for possible CHF; S&S eval  - Dispo: back to rehab pending PT vs. per family wishes - c/w statin

## 2020-04-14 NOTE — H&P ADULT - HISTORY OF PRESENT ILLNESS
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 Patient unable to provide hx.  Patient's granddaughter provides history.  Patient recently discharged from [facility] 5 day ago after being found to have COVID admitted 3/31-, treated with 5D course of plaquinel, possible new onset CHF given lasix and started on lisinopril.  Upon discharge patient was AAO x 3 and able to perform ADLs.  Only requiring 2L of supplemental oxygen via nasal canula. Over the past few days, patient has become increasingly SOB; over the past 24hrs patient has become confused, lethargic and nonverbal.  Today, O2 sat found to be in the mid 80s on 6L NC. In the ED, Patient placed on Nonrebreather by EMS with improvement of pulsox.    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 Initially, Patient unable to provide hx.  Patient's granddaughter and chart review informed history.  Patient recently discharged from [facility] 5 day ago after being found to have COVID admitted 3/31-, treated with 5D course of plaquinel, possible new onset CHF given lasix and started on lisinopril.  Upon discharge patient was AAO x 3 and able to perform ADLs.  Only requiring 2L of supplemental oxygen via nasal canula. Over the past few days, patient has become increasingly SOB; over the past 24hrs patient has become confused, lethargic and nonverbal.  Today, O2 sat found to be in the mid 80s on 6L NC. In the ED, Patient placed on Nonrebreather by EMS with improvement of pulsox. On my interview, patient was AAOx3 to person, place and time. He is with some labored breathing when speaking but able to speak in full sentences. He reports he has had very poor appetite in the past few weeks. Reports labored breathing comes and goes.    In the ED:  - VS - Tm 98.2, HR 54-78, -154/61-88, RR 20-24, O2  on NRB, to 100 on 4L NC  - Relevant labs: lymphopenic, BUN 48, Cr 1.37 (BL 1-1.4), bilirubin 2, AB.41/39/25/88, trop 28, UA unremarkable  - IMG: CXR - Diffuse bilateral hazy and patchy opacities, increased compared to 2020 and consistent with COVID 19 pneumonia  - CT head - Focal vague lucency within the ike on the left suspicious for an age-indeterminate lacunar infarct. Brain MR could be performed for further evaluation as clinically warranted. No intracranial mass effect or hemorrhage. 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 Initially, Patient unable to provide hx.  Patient's granddaughter and chart review informed history.  Patient recently discharged from [facility] 5 day ago after being found to have COVID admitted 3/31-, treated with 5D course of plaquinel, possible new onset CHF given lasix and started on lisinopril.  Upon discharge patient was AAO x 3 and able to perform ADLs.  Only requiring 2L of supplemental oxygen via nasal canula. Over the past few days, patient has become increasingly SOB; over the past 24hrs patient has become confused, lethargic and nonverbal.  Today, O2 sat found to be in the mid 80s on 6L NC. In the ED, Patient placed on Nonrebreather by EMS with improvement of pulsox. On my interview, patient was AAOx3 to person, place and time. He is with some labored breathing when speaking but able to speak in full sentences. He reports he has had very poor appetite in the past few weeks. Reports labored breathing comes and goes.    In the ED:  - VS - Tm 98.2, HR 54-78, -154/61-88, RR 20-24, O2  on NRB, to 100 on 4L NC  - Relevant labs: lymphopenic, BUN 48, Cr 1.37 (BL 1-1.4), bilirubin 2, AB.41/39/25/88, trop 28, UA unremarkable  - IMG: CXR - Diffuse bilateral hazy and patchy opacities, increased compared to 2020 and consistent with COVID 19 pneumonia  - CT head - Focal vague lucency within the ike on the left suspicious for an age-indeterminate lacunar infarct. Brain MR could be performed for further evaluation as clinically warranted. No intracranial mass effect or hemorrhage.

## 2020-04-14 NOTE — H&P ADULT - PROBLEM SELECTOR PLAN 4
- c/w metoprolol and eliquis - on CTH, pontine lacunar infarct of undetermined etiology  - consider adding ASA 81 in future following COVID infection  - further GOC discussion with family  - per family: would like palliative c/s for further GOC and planning (considering hospice inpt vs. home?) Currently DNR/DNI  - S&S and PT eval

## 2020-04-14 NOTE — ED ADULT NURSE REASSESSMENT NOTE - NS ED NURSE REASSESS COMMENT FT1
Pt resting comfortably in bed at this time, VS as documented. Patient denies pain at this time. Report given to JENNIFER Salas on [facility], ready for transport.

## 2020-04-14 NOTE — ED ADULT NURSE REASSESSMENT NOTE - NS ED NURSE REASSESS COMMENT FT1
Report received from JENNIFER Capellan. Pt resting comfortably in bed at this time. VS as documented. A&O x3. Bed locked and lowered. Comfort and safety measures maintained.

## 2020-04-14 NOTE — H&P ADULT - NSHPLABSRESULTS_GEN_ALL_CORE
CBC Full  -  ( 2020 19:23 )  WBC Count : 7.40 K/uL  Hemoglobin : 9.8 g/dL  Hematocrit : 33.1 %  Platelet Count - Automated : 376 K/uL  Mean Cell Volume : 69.8 fl  Mean Cell Hemoglobin : 20.7 pg  Mean Cell Hemoglobin Concentration : 29.6 gm/dL  Auto Neutrophil # : 5.96 K/uL  Auto Lymphocyte # : 0.74 K/uL  Auto Monocyte # : 0.53 K/uL  Auto Eosinophil # : 0.04 K/uL  Auto Basophil # : 0.03 K/uL  Auto Neutrophil % : 80.5 %  Auto Lymphocyte % : 10.0 %  Auto Monocyte % : 7.2 %  Auto Eosinophil % : 0.5 %  Auto Basophil % : 0.4 %        140  |  105  |  48<H>  ----------------------------<  95  5.3   |  22  |  1.37<H>    Ca    9.3      2020 19:23    TPro  8.6<H>  /  Alb  2.6<L>  /  TBili  2.0<H>  /  DBili  x   /  AST  39  /  ALT  25  /  AlkPhos  105  04-14    LIVER FUNCTIONS - ( 2020 19:23 )  Alb: 2.6 g/dL / Pro: 8.6 g/dL / ALK PHOS: 105 U/L / ALT: 25 U/L / AST: 39 U/L / GGT: x           Urinalysis Basic - ( 2020 19:38 )    Color: Yellow / Appearance: Clear / S.025 / pH: x  Gluc: x / Ketone: Small  / Bili: Negative / Urobili: 4 mg/dL   Blood: x / Protein: 30 mg/dL / Nitrite: Negative   Leuk Esterase: Negative / RBC: 1 /hpf / WBC 1 /HPF   Sq Epi: x / Non Sq Epi: 5 /hpf / Bacteria: Negative      PT/INR - ( 2020 19:23 )   PT: 29.4 sec;   INR: 2.49 ratio         PTT - ( 2020 19:23 )  PTT:36.5 sec  ABG - ( 2020 19:38 )  pH, Arterial: 7.41  pH, Blood: x     /  pCO2: 39    /  pO2: 88    / HCO3: 25    / Base Excess: .6    /  SaO2: 95                      EKG:       Imaging:    < from: Xray Chest 1 View AP/PA (20 @ 18:34) >    INTERPRETATION:  Diffuse bilateral hazy and patchy opacities, increased compared to 2020 and consistent with COVID 19 pneumonia    < end of copied text >    < from: CT Head No Cont (20 @ 18:50) >    IMPRESSION:    Focal vague lucency within the ike on the left suspicious for an age-indeterminate lacunar infarct. Brain MR could be performed for further evaluation as clinically warranted.    No intracranial mass effect or hemorrhage.    Dr. Milton discussed these findings with Dr. Olsen on 2020 7:30 PM, with read back.    < end of copied text > Labs reviewed    CBC Full  -  ( 2020 19:23 )  WBC Count : 7.40 K/uL  Hemoglobin : 9.8 g/dL  Hematocrit : 33.1 %  Platelet Count - Automated : 376 K/uL  Mean Cell Volume : 69.8 fl  Mean Cell Hemoglobin : 20.7 pg  Mean Cell Hemoglobin Concentration : 29.6 gm/dL  Auto Neutrophil # : 5.96 K/uL  Auto Lymphocyte # : 0.74 K/uL  Auto Monocyte # : 0.53 K/uL  Auto Eosinophil # : 0.04 K/uL  Auto Basophil # : 0.03 K/uL  Auto Neutrophil % : 80.5 %  Auto Lymphocyte % : 10.0 %  Auto Monocyte % : 7.2 %  Auto Eosinophil % : 0.5 %  Auto Basophil % : 0.4 %        140  |  105  |  48<H>  ----------------------------<  95  5.3   |  22  |  1.37<H>    Ca    9.3      2020 19:23    TPro  8.6<H>  /  Alb  2.6<L>  /  TBili  2.0<H>  /  DBili  x   /  AST  39  /  ALT  25  /  AlkPhos  105  04-14    LIVER FUNCTIONS - ( 2020 19:23 )  Alb: 2.6 g/dL / Pro: 8.6 g/dL / ALK PHOS: 105 U/L / ALT: 25 U/L / AST: 39 U/L / GGT: x           Urinalysis Basic - ( 2020 19:38 )    Color: Yellow / Appearance: Clear / S.025 / pH: x  Gluc: x / Ketone: Small  / Bili: Negative / Urobili: 4 mg/dL   Blood: x / Protein: 30 mg/dL / Nitrite: Negative   Leuk Esterase: Negative / RBC: 1 /hpf / WBC 1 /HPF   Sq Epi: x / Non Sq Epi: 5 /hpf / Bacteria: Negative      PT/INR - ( 2020 19:23 )   PT: 29.4 sec;   INR: 2.49 ratio         PTT - ( 2020 19:23 )  PTT:36.5 sec  ABG - ( 2020 19:38 )  pH, Arterial: 7.41  pH, Blood: x     /  pCO2: 39    /  pO2: 88    / HCO3: 25    / Base Excess: .6    /  SaO2: 95          EKG per ED: Afib w/RVR similar to previous       Imaging reviewed    < from: Xray Chest 1 View AP/PA (20 @ 18:34) >    INTERPRETATION:  Diffuse bilateral hazy and patchy opacities, increased compared to 2020 and consistent with COVID 19 pneumonia    < end of copied text >    < from: CT Head No Cont (20 @ 18:50) >    IMPRESSION:    Focal vague lucency within the ike on the left suspicious for an age-indeterminate lacunar infarct. Brain MR could be performed for further evaluation as clinically warranted.    No intracranial mass effect or hemorrhage.

## 2020-04-14 NOTE — H&P ADULT - PROBLEM SELECTOR PLAN 2
- s/p plaquinel on prior admission  - was desaturating to 80s per chart in rehab  - currently saturating well on 4L NC  - could consider steroids or anakinra (renally dosed) if no further improvement  - c/w eliquis full dose for AC - could be 2/2 COVID  - PE less likely given already on eliquis  - c/w supp O2  - hold steroids for now unless clinicaly decompensation - could be 2/2 COVID; ?r/o fluid overload, though no edema on exam; weight previous admission 74kg, this admission 57kg; will need to repeat;  - PE less likely given already on eliquis  - c/w supp O2  - hold steroids for now unless clinical decompensation;  - ?start IV lasix

## 2020-04-14 NOTE — ED PROVIDER NOTE - ATTENDING CONTRIBUTION TO CARE
86M, pmh mi s/p cabg, alpha-thal, htn/hld, afib on eliquis, cva, recently admitted and dx with covid-19. pt non-verbal at this time. per granddaughter has become acutely altered over last couple of days.    PE: Chronically ill appearing, nad, NCAT, MMM, Trachea midline, Normal conjunctiva, tachypneic, S1/S2 tachycardic, Normal perfusion, 2+ radial pulses bilat, Abdomen Soft, NTND, No rebound/guarding, No LE edema, No deformity of extremities, No rashes,  No focal motor or sensory deficits.     Pt with increased o2 requirements, acute mental status change. Most likely is 2/2 covid-19 infection however consider other derangement such as metabolic, head bleed. labs, imaging. plan to admit. - Rowdy Olsen MD

## 2020-04-15 DIAGNOSIS — I48.91 UNSPECIFIED ATRIAL FIBRILLATION: ICD-10-CM

## 2020-04-15 DIAGNOSIS — B37.9 CANDIDIASIS, UNSPECIFIED: ICD-10-CM

## 2020-04-15 DIAGNOSIS — Z29.9 ENCOUNTER FOR PROPHYLACTIC MEASURES, UNSPECIFIED: ICD-10-CM

## 2020-04-15 DIAGNOSIS — Z71.89 OTHER SPECIFIED COUNSELING: ICD-10-CM

## 2020-04-15 DIAGNOSIS — N18.9 CHRONIC KIDNEY DISEASE, UNSPECIFIED: ICD-10-CM

## 2020-04-15 DIAGNOSIS — J96.01 ACUTE RESPIRATORY FAILURE WITH HYPOXIA: ICD-10-CM

## 2020-04-15 DIAGNOSIS — I10 ESSENTIAL (PRIMARY) HYPERTENSION: ICD-10-CM

## 2020-04-15 DIAGNOSIS — I63.9 CEREBRAL INFARCTION, UNSPECIFIED: ICD-10-CM

## 2020-04-15 DIAGNOSIS — G93.40 ENCEPHALOPATHY, UNSPECIFIED: ICD-10-CM

## 2020-04-15 DIAGNOSIS — J96.91 RESPIRATORY FAILURE, UNSPECIFIED WITH HYPOXIA: ICD-10-CM

## 2020-04-15 DIAGNOSIS — Z02.9 ENCOUNTER FOR ADMINISTRATIVE EXAMINATIONS, UNSPECIFIED: ICD-10-CM

## 2020-04-15 DIAGNOSIS — R53.2 FUNCTIONAL QUADRIPLEGIA: ICD-10-CM

## 2020-04-15 DIAGNOSIS — E78.5 HYPERLIPIDEMIA, UNSPECIFIED: ICD-10-CM

## 2020-04-15 DIAGNOSIS — Z51.5 ENCOUNTER FOR PALLIATIVE CARE: ICD-10-CM

## 2020-04-15 DIAGNOSIS — U07.1 COVID-19: ICD-10-CM

## 2020-04-15 LAB
ALBUMIN SERPL ELPH-MCNC: 2.8 G/DL — LOW (ref 3.3–5)
ALP SERPL-CCNC: 101 U/L — SIGNIFICANT CHANGE UP (ref 40–120)
ALT FLD-CCNC: 22 U/L — SIGNIFICANT CHANGE UP (ref 10–45)
ANION GAP SERPL CALC-SCNC: 12 MMOL/L — SIGNIFICANT CHANGE UP (ref 5–17)
AST SERPL-CCNC: 34 U/L — SIGNIFICANT CHANGE UP (ref 10–40)
BILIRUB DIRECT SERPL-MCNC: 0.8 MG/DL — HIGH (ref 0–0.2)
BILIRUB INDIRECT FLD-MCNC: 1.2 MG/DL — HIGH (ref 0.2–1)
BILIRUB SERPL-MCNC: 2 MG/DL — HIGH (ref 0.2–1.2)
BILIRUB SERPL-MCNC: 2 MG/DL — HIGH (ref 0.2–1.2)
BUN SERPL-MCNC: 47 MG/DL — HIGH (ref 7–23)
CALCIUM SERPL-MCNC: 9.2 MG/DL — SIGNIFICANT CHANGE UP (ref 8.4–10.5)
CHLORIDE SERPL-SCNC: 105 MMOL/L — SIGNIFICANT CHANGE UP (ref 96–108)
CO2 SERPL-SCNC: 28 MMOL/L — SIGNIFICANT CHANGE UP (ref 22–31)
CREAT SERPL-MCNC: 1.34 MG/DL — HIGH (ref 0.5–1.3)
CULTURE RESULTS: SIGNIFICANT CHANGE UP
D DIMER BLD IA.RAPID-MCNC: 1808 NG/ML DDU — HIGH
FERRITIN SERPL-MCNC: 492 NG/ML — HIGH (ref 30–400)
FOLATE SERPL-MCNC: 17.7 NG/ML — SIGNIFICANT CHANGE UP
GLUCOSE SERPL-MCNC: 127 MG/DL — HIGH (ref 70–99)
HAPTOGLOB SERPL-MCNC: 237 MG/DL — HIGH (ref 34–200)
HCT VFR BLD CALC: 33.1 % — LOW (ref 39–50)
HGB BLD-MCNC: 10 G/DL — LOW (ref 13–17)
LDH SERPL L TO P-CCNC: 315 U/L — HIGH (ref 50–242)
MAGNESIUM SERPL-MCNC: 2.4 MG/DL — SIGNIFICANT CHANGE UP (ref 1.6–2.6)
MCHC RBC-ENTMCNC: 20.8 PG — LOW (ref 27–34)
MCHC RBC-ENTMCNC: 30.2 GM/DL — LOW (ref 32–36)
MCV RBC AUTO: 68.8 FL — LOW (ref 80–100)
NRBC # BLD: 0 /100 WBCS — SIGNIFICANT CHANGE UP (ref 0–0)
PHOSPHATE SERPL-MCNC: 4.2 MG/DL — SIGNIFICANT CHANGE UP (ref 2.5–4.5)
PLATELET # BLD AUTO: 369 K/UL — SIGNIFICANT CHANGE UP (ref 150–400)
POTASSIUM SERPL-MCNC: 5.1 MMOL/L — SIGNIFICANT CHANGE UP (ref 3.5–5.3)
POTASSIUM SERPL-SCNC: 5.1 MMOL/L — SIGNIFICANT CHANGE UP (ref 3.5–5.3)
PROCALCITONIN SERPL-MCNC: 0.1 NG/ML — SIGNIFICANT CHANGE UP (ref 0.02–0.1)
PROT SERPL-MCNC: 8.6 G/DL — HIGH (ref 6–8.3)
RBC # BLD: 4.81 M/UL — SIGNIFICANT CHANGE UP (ref 4.2–5.8)
RBC # BLD: 4.81 M/UL — SIGNIFICANT CHANGE UP (ref 4.2–5.8)
RBC # FLD: 17.7 % — HIGH (ref 10.3–14.5)
RETICS #: 138.8 K/UL — HIGH (ref 25–125)
RETICS/RBC NFR: 2.9 % — HIGH (ref 0.5–2.5)
SODIUM SERPL-SCNC: 145 MMOL/L — SIGNIFICANT CHANGE UP (ref 135–145)
SPECIMEN SOURCE: SIGNIFICANT CHANGE UP
TROPONIN T, HIGH SENSITIVITY RESULT: 49 NG/L — SIGNIFICANT CHANGE UP (ref 0–51)
VIT B12 SERPL-MCNC: 1835 PG/ML — HIGH (ref 232–1245)
WBC # BLD: 7.47 K/UL — SIGNIFICANT CHANGE UP (ref 3.8–10.5)
WBC # FLD AUTO: 7.47 K/UL — SIGNIFICANT CHANGE UP (ref 3.8–10.5)

## 2020-04-15 PROCEDURE — 99223 1ST HOSP IP/OBS HIGH 75: CPT

## 2020-04-15 PROCEDURE — 99232 SBSQ HOSP IP/OBS MODERATE 35: CPT

## 2020-04-15 PROCEDURE — 99497 ADVNCD CARE PLAN 30 MIN: CPT | Mod: 25

## 2020-04-15 RX ORDER — FLUCONAZOLE 150 MG/1
TABLET ORAL
Refills: 0 | Status: DISCONTINUED | OUTPATIENT
Start: 2020-04-15 | End: 2020-04-19

## 2020-04-15 RX ORDER — APIXABAN 2.5 MG/1
5 TABLET, FILM COATED ORAL EVERY 12 HOURS
Refills: 0 | Status: DISCONTINUED | OUTPATIENT
Start: 2020-04-15 | End: 2020-04-20

## 2020-04-15 RX ORDER — FLUCONAZOLE 150 MG/1
TABLET ORAL
Refills: 0 | Status: DISCONTINUED | OUTPATIENT
Start: 2020-04-15 | End: 2020-04-15

## 2020-04-15 RX ORDER — METOPROLOL TARTRATE 50 MG
25 TABLET ORAL
Refills: 0 | Status: DISCONTINUED | OUTPATIENT
Start: 2020-04-15 | End: 2020-04-20

## 2020-04-15 RX ORDER — FUROSEMIDE 40 MG
40 TABLET ORAL ONCE
Refills: 0 | Status: COMPLETED | OUTPATIENT
Start: 2020-04-15 | End: 2020-04-15

## 2020-04-15 RX ORDER — ACETAMINOPHEN 500 MG
650 TABLET ORAL EVERY 6 HOURS
Refills: 0 | Status: DISCONTINUED | OUTPATIENT
Start: 2020-04-15 | End: 2020-04-15

## 2020-04-15 RX ORDER — MORPHINE SULFATE 50 MG/1
2 CAPSULE, EXTENDED RELEASE ORAL EVERY 6 HOURS
Refills: 0 | Status: DISCONTINUED | OUTPATIENT
Start: 2020-04-15 | End: 2020-04-15

## 2020-04-15 RX ORDER — FLUCONAZOLE 150 MG/1
400 TABLET ORAL ONCE
Refills: 0 | Status: DISCONTINUED | OUTPATIENT
Start: 2020-04-15 | End: 2020-04-15

## 2020-04-15 RX ORDER — FLUCONAZOLE 150 MG/1
200 TABLET ORAL EVERY 24 HOURS
Refills: 0 | Status: DISCONTINUED | OUTPATIENT
Start: 2020-04-16 | End: 2020-04-19

## 2020-04-15 RX ORDER — HYDROMORPHONE HYDROCHLORIDE 2 MG/ML
0.25 INJECTION INTRAMUSCULAR; INTRAVENOUS; SUBCUTANEOUS EVERY 6 HOURS
Refills: 0 | Status: DISCONTINUED | OUTPATIENT
Start: 2020-04-15 | End: 2020-04-16

## 2020-04-15 RX ORDER — FLUCONAZOLE 150 MG/1
400 TABLET ORAL ONCE
Refills: 0 | Status: COMPLETED | OUTPATIENT
Start: 2020-04-15 | End: 2020-04-15

## 2020-04-15 RX ORDER — ATORVASTATIN CALCIUM 80 MG/1
40 TABLET, FILM COATED ORAL AT BEDTIME
Refills: 0 | Status: DISCONTINUED | OUTPATIENT
Start: 2020-04-15 | End: 2020-04-16

## 2020-04-15 RX ORDER — ASPIRIN/CALCIUM CARB/MAGNESIUM 324 MG
81 TABLET ORAL DAILY
Refills: 0 | Status: DISCONTINUED | OUTPATIENT
Start: 2020-04-15 | End: 2020-04-15

## 2020-04-15 RX ORDER — ACETAMINOPHEN 500 MG
650 TABLET ORAL EVERY 8 HOURS
Refills: 0 | Status: DISCONTINUED | OUTPATIENT
Start: 2020-04-15 | End: 2020-04-20

## 2020-04-15 RX ADMIN — Medication 40 MILLIGRAM(S): at 04:40

## 2020-04-15 RX ADMIN — Medication 25 MILLIGRAM(S): at 18:48

## 2020-04-15 RX ADMIN — APIXABAN 5 MILLIGRAM(S): 2.5 TABLET, FILM COATED ORAL at 18:48

## 2020-04-15 RX ADMIN — ATORVASTATIN CALCIUM 40 MILLIGRAM(S): 80 TABLET, FILM COATED ORAL at 21:54

## 2020-04-15 RX ADMIN — HYDROMORPHONE HYDROCHLORIDE 0.25 MILLIGRAM(S): 2 INJECTION INTRAMUSCULAR; INTRAVENOUS; SUBCUTANEOUS at 17:39

## 2020-04-15 RX ADMIN — APIXABAN 5 MILLIGRAM(S): 2.5 TABLET, FILM COATED ORAL at 01:30

## 2020-04-15 RX ADMIN — FLUCONAZOLE 100 MILLIGRAM(S): 150 TABLET ORAL at 17:38

## 2020-04-15 RX ADMIN — Medication 25 MILLIGRAM(S): at 06:37

## 2020-04-15 NOTE — PROGRESS NOTE ADULT - PROBLEM SELECTOR PLAN 10
- PCP: unkown  - Dispo: back to rehab pending PT vs. per family wishes - PCP: melissawernestine  - Dispo: back to rehab pending PT vs. per family wishes  -GOC: Per patient's hcp, goal is home hospice. Will assess ability to take PO medicine and try to wean O2 to these ends. Will confirm with patient and then sign MOLST today. - PCP: melissawernestine  - Dispo: back to rehab pending PT vs. per family wishes  -GOC: Per patient's hcp, goal is home hospice. Will assess ability to take PO medicine and try to wean O2 to these ends. Will confirm with patient and then sign MOLST today.    Destin Church, PGY1  [phone]

## 2020-04-15 NOTE — PROGRESS NOTE ADULT - PROBLEM SELECTOR PLAN 4
- on CTH, pontine lacunar infarct of undetermined etiology  - consider adding ASA 81 in future following COVID infection  - further GOC discussion with family  - per family: would like palliative c/s for further GOC and planning (considering hospice inpt vs. home?) Currently DNR/DNI  - S&S and PT eval - on CTH, pontine lacunar infarct of undetermined etiology  - further GOC discussion with family  - per family: would like palliative c/s for further GOC and planning (considering hospice inpt vs. home?) Currently DNR/DNI

## 2020-04-15 NOTE — CONSULT NOTE ADULT - ASSESSMENT
85 yo M MI s/p CABG, HTN, HLD, afib, CVA p/w AMS with hypoxia due to COVID 19 now with complications of PNA due to COVID..  Palliative care called for GOC.

## 2020-04-15 NOTE — PHYSICAL THERAPY INITIAL EVALUATION ADULT - PRECAUTIONS/LIMITATIONS, REHAB EVAL
oxygen therapy device and L/min oxygen therapy device and L/min/swallowing precautions, awating speech and swallow eval

## 2020-04-15 NOTE — PHYSICAL THERAPY INITIAL EVALUATION ADULT - GENERAL OBSERVATIONS, REHAB EVAL
Rec'd in Rec'd in bed on 4 liters nasal cannula, + Iv lock, c/o difficulty swallowing, agreeable to PT

## 2020-04-15 NOTE — CONSULT NOTE ADULT - PROBLEM SELECTOR RECOMMENDATION 6
spoke wit pts granddaughter Dc via the phone.  Pt has 3 dtsr who are the equal and legal surrogates.  No HCP form filled out.  She states the goal is comfort.  Dialdudid started for dyspnea.  Pt is DNR/DNI MOLST form to be completed by primary team as per my conversation with team 4.  Plan to be discussed with family tonight, including [place] with transition to hospice there or home with hospice.  I will reach out to Dc tomorrow to further discuss.

## 2020-04-15 NOTE — PHYSICAL THERAPY INITIAL EVALUATION ADULT - CRITERIA FOR SKILLED THERAPEUTIC INTERVENTIONS
back to subacute rehab pending palliative care consult/anticipated discharge recommendation/impairments found

## 2020-04-15 NOTE — PROGRESS NOTE ADULT - PROBLEM SELECTOR PLAN 9
IMPROVE >2  - DVT: eliquis BID  - Diet: Dysphagia I with fluid restriction for possible CHF; S&S eval  - Dispo: back to rehab pending PT vs. per family wishes Per HCP, patient is DNR/DNI and goal is to [place] for hospice care  -Complains of shortness of breath but no pain despite improved O2 saturation.  -Dilaudid 0.25 mg IV q6h for air hunger

## 2020-04-15 NOTE — PROGRESS NOTE ADULT - PROBLEM SELECTOR PLAN 3
- s/p plaquinel on prior admission  - was desaturating to 80s per chart in rehab  - currently saturating well on 4L NC  - could consider steroids or anakinra (renally dosed) if no further improvement  - c/w eliquis full dose for AC  - trend CRP, ferritin, d-dimer, LDH, procalcitonin  - if worsening, will get CT chest

## 2020-04-15 NOTE — PROGRESS NOTE ADULT - SUBJECTIVE AND OBJECTIVE BOX
INCOMPLETE NOTE PROGRESS NOTE:     Patient is a 86y old  Male who presents with a chief complaint of encephalopathy, hypoxia, respiratory failure (15 Apr 2020 06:54)      SUBJECTIVE / OVERNIGHT EVENTS:  Still feels short of breath and complains of cough but improved from presentation  No complaints of pain    ADDITIONAL REVIEW OF SYSTEMS:  No subj fever, no abdominal pain, no chest pain    MEDICATIONS  (STANDING):  apixaban 5 milliGRAM(s) Oral every 12 hours  atorvastatin 40 milliGRAM(s) Oral at bedtime  metoprolol tartrate 25 milliGRAM(s) Oral two times a day    MEDICATIONS  (PRN):  acetaminophen   Tablet .. 650 milliGRAM(s) Oral every 8 hours PRN Temp greater or equal to 38C (100.4F), Mild Pain (1 - 3), Moderate Pain (4 - 6)  morphine  - Injectable 2 milliGRAM(s) IV Push once PRN Moderate Pain (4 - 6)      CAPILLARY BLOOD GLUCOSE        I&O's Summary    15 Apr 2020 07:01  -  15 Apr 2020 11:10  --------------------------------------------------------  IN: 0 mL / OUT: 0 mL / NET: 0 mL        PHYSICAL EXAM:  Vital Signs Last 24 Hrs  T(C): 36.3 (15 Apr 2020 09:58), Max: 36.8 (2020 17:35)  T(F): 97.4 (15 Apr 2020 09:58), Max: 98.2 (2020 17:35)  HR: 64 (15 Apr 2020 09:58) (54 - 82)  BP: 118/68 (15 Apr 2020 09:58) (118/68 - 167/83)  BP(mean): --  RR: 20 (15 Apr 2020 09:58) (18 - 24)  SpO2: 95% (15 Apr 2020 09:58) (95% - 100%)    CONSTITUTIONAL: NAD, well-developed  ENT:  Very dry mucous membranes  CARDIOVASCULAR: Regular rate and rhythm. Normal S1 and S2. No murmurs.  RESPIRATORY: Decreased breath sounds and fine crackles throughout  EXTREMITIES: No STORM, peripheral pulses intact.  ABDOMEN: Nontender to palpation, normoactive bowel sounds, no rebound/guarding; No hepatosplenomegaly  PSYCH: A+O to person, place, and time; affect appropriate  NEURO: CN II-XII grossly intact. MAEx4. Sensory to light touch intact throughout.    LABS:                        10.0   7.47  )-----------( 369      ( 15 Apr 2020 10:01 )             33.1     04-15    145  |  105  |  47<H>  ----------------------------<  127<H>  5.1   |  28  |  1.34<H>    Ca    9.2      15 Apr 2020 10:01  Phos  4.2     04-15  Mg     2.4     04-15    TPro  8.6<H>  /  Alb  2.8<L>  /  TBili  2.0<H>  /  DBili  0.8<H>  /  AST  34  /  ALT  22  /  AlkPhos  101  04-15    PT/INR - ( 2020 19:23 )   PT: 29.4 sec;   INR: 2.49 ratio         PTT - ( 2020 19:23 )  PTT:36.5 sec    D-dimer 1808 < 1700    Urinalysis Basic - ( 2020 19:38 )    Color: Yellow / Appearance: Clear / S.025 / pH: x  Gluc: x / Ketone: Small  / Bili: Negative / Urobili: 4 mg/dL   Blood: x / Protein: 30 mg/dL / Nitrite: Negative   Leuk Esterase: Negative / RBC: 1 /hpf / WBC 1 /HPF   Sq Epi: x / Non Sq Epi: 5 /hpf / Bacteria: Negative          RADIOLOGY & ADDITIONAL TESTS:  Results Reviewed: Notable for roughly stable d-dimer. Creatinine at 1.37.  Imaging Personally Reviewed: Bilateral hazy opacities on CXR and CTH with age indeterminate lacunar infarct  Electrocardiogram Personally Reviewed:    COORDINATION OF CARE:  Care Discussed with Consultants/Other Providers [Y/N]:  Prior or Outpatient Records Reviewed [Y/N]: PROGRESS NOTE:     Patient is a 86y old  Male who presents with a chief complaint of encephalopathy, hypoxia, respiratory failure (15 Apr 2020 06:54)      SUBJECTIVE / OVERNIGHT EVENTS:  Still feels short of breath and complains of cough but improved from presentation  No complaints of pain    Per family who is MD, patient had scrapable white plaque. She reports dypshonia from baseline    ADDITIONAL REVIEW OF SYSTEMS:  No subj fever, no abdominal pain, no chest pain    MEDICATIONS  (STANDING):  apixaban 5 milliGRAM(s) Oral every 12 hours  atorvastatin 40 milliGRAM(s) Oral at bedtime  metoprolol tartrate 25 milliGRAM(s) Oral two times a day    MEDICATIONS  (PRN):  acetaminophen   Tablet .. 650 milliGRAM(s) Oral every 8 hours PRN Temp greater or equal to 38C (100.4F), Mild Pain (1 - 3), Moderate Pain (4 - 6)  morphine  - Injectable 2 milliGRAM(s) IV Push once PRN Moderate Pain (4 - 6)      CAPILLARY BLOOD GLUCOSE        I&O's Summary    15 Apr 2020 07:01  -  15 Apr 2020 11:10  --------------------------------------------------------  IN: 0 mL / OUT: 0 mL / NET: 0 mL        PHYSICAL EXAM:  Vital Signs Last 24 Hrs  T(C): 36.3 (15 Apr 2020 09:58), Max: 36.8 (2020 17:35)  T(F): 97.4 (15 Apr 2020 09:58), Max: 98.2 (2020 17:35)  HR: 64 (15 Apr 2020 09:58) (54 - 82)  BP: 118/68 (15 Apr 2020 09:58) (118/68 - 167/83)  BP(mean): --  RR: 20 (15 Apr 2020 09:58) (18 - 24)  SpO2: 95% (15 Apr 2020 09:58) (95% - 100%)    CONSTITUTIONAL: NAD, well-developed  ENT:  Very dry mucous membranes  CARDIOVASCULAR: Regular rate and rhythm. Normal S1 and S2. No murmurs.  RESPIRATORY: Decreased breath sounds and fine crackles throughout  EXTREMITIES: No STORM, peripheral pulses intact.  ABDOMEN: Nontender to palpation, normoactive bowel sounds, no rebound/guarding; No hepatosplenomegaly  PSYCH: A+O to person, place, and time; affect appropriate  NEURO: CN II-XII grossly intact. MAEx4. Sensory to light touch intact throughout.    LABS:                        10.0   7.47  )-----------( 369      ( 15 Apr 2020 10:01 )             33.1     04-15    145  |  105  |  47<H>  ----------------------------<  127<H>  5.1   |  28  |  1.34<H>    Ca    9.2      15 Apr 2020 10:01  Phos  4.2     04-15  Mg     2.4     04-15    TPro  8.6<H>  /  Alb  2.8<L>  /  TBili  2.0<H>  /  DBili  0.8<H>  /  AST  34  /  ALT  22  /  AlkPhos  101  04-15    PT/INR - ( 2020 19:23 )   PT: 29.4 sec;   INR: 2.49 ratio         PTT - ( 2020 19:23 )  PTT:36.5 sec    D-dimer 1808 < 1700    Urinalysis Basic - ( 2020 19:38 )    Color: Yellow / Appearance: Clear / S.025 / pH: x  Gluc: x / Ketone: Small  / Bili: Negative / Urobili: 4 mg/dL   Blood: x / Protein: 30 mg/dL / Nitrite: Negative   Leuk Esterase: Negative / RBC: 1 /hpf / WBC 1 /HPF   Sq Epi: x / Non Sq Epi: 5 /hpf / Bacteria: Negative          RADIOLOGY & ADDITIONAL TESTS:  Results Reviewed: Notable for roughly stable d-dimer. Creatinine at 1.37.  Imaging Personally Reviewed: Bilateral hazy opacities on CXR and CTH with age indeterminate lacunar infarct  Electrocardiogram Personally Reviewed:    COORDINATION OF CARE:  Care Discussed with Consultants/Other Providers [Y/N]:  Prior or Outpatient Records Reviewed [Y/N]: PROGRESS NOTE:     Patient is a 86y old  Male who presents with a chief complaint of encephalopathy, hypoxia, respiratory failure (15 Apr 2020 06:54)      SUBJECTIVE / OVERNIGHT EVENTS:  Still feels short of breath and complains of cough but improved from presentation  No complaints of pain    Per family who is MD, patient had scrapable white plaque. She reports dypshonia from baseline. Patient is not at mental baseline at this time despite being AOX3 per healthcare proxy.    ADDITIONAL REVIEW OF SYSTEMS:  No subj fever, no abdominal pain, no chest pain    MEDICATIONS  (STANDING):  apixaban 5 milliGRAM(s) Oral every 12 hours  atorvastatin 40 milliGRAM(s) Oral at bedtime  metoprolol tartrate 25 milliGRAM(s) Oral two times a day    MEDICATIONS  (PRN):  acetaminophen   Tablet .. 650 milliGRAM(s) Oral every 8 hours PRN Temp greater or equal to 38C (100.4F), Mild Pain (1 - 3), Moderate Pain (4 - 6)  morphine  - Injectable 2 milliGRAM(s) IV Push once PRN Moderate Pain (4 - 6)      CAPILLARY BLOOD GLUCOSE        I&O's Summary    15 Apr 2020 07:01  -  15 Apr 2020 11:10  --------------------------------------------------------  IN: 0 mL / OUT: 0 mL / NET: 0 mL        PHYSICAL EXAM:  Vital Signs Last 24 Hrs  T(C): 36.3 (15 Apr 2020 09:58), Max: 36.8 (2020 17:35)  T(F): 97.4 (15 Apr 2020 09:58), Max: 98.2 (2020 17:35)  HR: 64 (15 Apr 2020 09:58) (54 - 82)  BP: 118/68 (15 Apr 2020 09:58) (118/68 - 167/83)  BP(mean): --  RR: 20 (15 Apr 2020 09:58) (18 - 24)  SpO2: 95% (15 Apr 2020 09:58) (95% - 100%)    CONSTITUTIONAL: NAD, well-developed  ENT:  Very dry mucous membranes  CARDIOVASCULAR: Regular rate and rhythm. Normal S1 and S2. No murmurs.  RESPIRATORY: Decreased breath sounds and fine crackles throughout  EXTREMITIES: No STORM, peripheral pulses intact.  ABDOMEN: Nontender to palpation, normoactive bowel sounds, no rebound/guarding; No hepatosplenomegaly  PSYCH: A+O to person, place, and time; affect appropriate  NEURO: CN II-XII grossly intact. MAEx4. Sensory to light touch intact throughout.    LABS:                        10.0   7.47  )-----------( 369      ( 15 Apr 2020 10:01 )             33.1     04-15    145  |  105  |  47<H>  ----------------------------<  127<H>  5.1   |  28  |  1.34<H>    Ca    9.2      15 Apr 2020 10:01  Phos  4.2     04-15  Mg     2.4     04-15    TPro  8.6<H>  /  Alb  2.8<L>  /  TBili  2.0<H>  /  DBili  0.8<H>  /  AST  34  /  ALT  22  /  AlkPhos  101  04-15    PT/INR - ( 2020 19:23 )   PT: 29.4 sec;   INR: 2.49 ratio         PTT - ( 2020 19:23 )  PTT:36.5 sec    D-dimer 1808 < 1700    Urinalysis Basic - ( 2020 19:38 )    Color: Yellow / Appearance: Clear / S.025 / pH: x  Gluc: x / Ketone: Small  / Bili: Negative / Urobili: 4 mg/dL   Blood: x / Protein: 30 mg/dL / Nitrite: Negative   Leuk Esterase: Negative / RBC: 1 /hpf / WBC 1 /HPF   Sq Epi: x / Non Sq Epi: 5 /hpf / Bacteria: Negative          RADIOLOGY & ADDITIONAL TESTS:  Results Reviewed: Notable for roughly stable d-dimer. Creatinine at 1.37.  Imaging Personally Reviewed: Bilateral hazy opacities on CXR and CTH with age indeterminate lacunar infarct  Electrocardiogram Personally Reviewed:    COORDINATION OF CARE:  Care Discussed with Consultants/Other Providers [Y/N]:  Prior or Outpatient Records Reviewed [Y/N]:

## 2020-04-15 NOTE — PHYSICAL THERAPY INITIAL EVALUATION ADULT - ADDITIONAL COMMENTS
lives w/ lives w/alone in private home, 4 steps to enter and then on one level. Glasses for distance, hearing good, R handed/does not use assistive device

## 2020-04-15 NOTE — PROGRESS NOTE ADULT - PROBLEM SELECTOR PLAN 1
- suspect 2/2 hypoxia and tachypnea/respiratory failure in setting of recent COVID  - also with age-indeterminate pontine infarct on CT head; no focal neurological deficit  - consider adding ASA 81 once patient recovers from COVID   - treat COVID as below  - recent TSH wnl, will check B12, folate, RPR for acute encephalopathy Suspect 2/2 hypoxia and tachypnea/respiratory failure in setting of recent COVID on top of history of pulmonary fibrosis  - also with age-indeterminate pontine infarct on CT head; no focal neurological deficit  - consider adding ASA 81 once patient recovers from COVID   - treat COVID as below  - recent TSH wnl. F/u B12, folate, RPR for acute encephalopathy. Although, now resolved.

## 2020-04-15 NOTE — CONSULT NOTE ADULT - SUBJECTIVE AND OBJECTIVE BOX
Due to COVID 19 and in order to decrease providers exposure and the usage or PPE  the H&P, ROS and/or PE was taken form the primary care teams note.  This writer is working remotely and is communicating to team and family via the phone.      HPI:  Portions of History and Physical Exam are adopted from ER Provider Notation per [facility] policy to limit healthcare provider exposure during COVID19 Pandemic    86M with CAD complicated by MI s/p CABG, alpha-thalassemia, HTN/HLD, a.fib on eliquis, CVA, presenting from rehab with altered mental status and hypoxia in setting of recent COVID-19 infection s/p plaquinel. Initially, Patient unable to provide hx.  Patient's granddaughter and chart review informed history.  Patient recently discharged from [facility] 5 day ago after being found to have COVID admitted 3/31-, treated with 5D course of plaquinel, possible new onset CHF given lasix and started on lisinopril.  Upon discharge patient was AAO x 3 and able to perform ADLs.  Only requiring 2L of supplemental oxygen via nasal canula. Over the past few days, patient has become increasingly SOB; over the past 24hrs patient has become confused, lethargic and nonverbal.  Today, O2 sat found to be in the mid 80s on 6L NC. In the ED, Patient placed on Nonrebreather by EMS with improvement of pulsox. On my interview, patient was AAOx3 to person, place and time. He is with some labored breathing when speaking but able to speak in full sentences. He reports he has had very poor appetite in the past few weeks. Reports labored breathing comes and goes.    In the ED:  - VS - Tm 98.2, HR 54-78, -154/61-88, RR 20-24, O2  on NRB, to 100 on 4L NC  - Relevant labs: lymphopenic, BUN 48, Cr 1.37 (BL 1-1.4), bilirubin 2, AB.41/39/25/88, trop 28, UA unremarkable  - IMG: CXR - Diffuse bilateral hazy and patchy opacities, increased compared to 2020 and consistent with COVID 19 pneumonia  - CT head - Focal vague lucency within the iek on the left suspicious for an age-indeterminate lacunar infarct. Brain MR could be performed for further evaluation as clinically warranted. No intracranial mass effect or hemorrhage. (2020 23:29)    PERTINENT PM/SXH:   Alpha thalassemia  HTN (hypertension)  Dyslipidemia  Myocardial infarction  CAD (coronary artery disease)    S/P CABG    FAMILY HISTORY:  No pertinent family history in first degree relatives    ITEMS NOT CHECKED ARE NOT PRESENT    SOCIAL HISTORY:   Significant other/partner   [x ]  Children[ ]  3 dtrs Yazidi/Spirituality:  Substance hx:  [ ]   Tobacco hx:  [ ]   Alcohol hx: [ ]   Home Opioid hx:  [ ] I-Stop Reference No:  Living Situation: [x ]Home  [ ]Long term care  [ ]Rehab [ ]Other    ADVANCE DIRECTIVES:    DNR  Yes  MOLST  [ x] to be completed by team 4 as per Destin Church (team 4)   Living Will  [ ]     DECISION MAKER(s):  [ ] Health Care Proxy(s)  [ x] Surrogate(s)  [ ] Guardian           Name(s): Phone Number(s):  [phone]    BASELINE (I)ADL(s) (prior to admission):  [place]: [ ]Total  [x ] Moderate [ ]Dependent    Allergies    No Known Allergies    Intolerances    MEDICATIONS  (STANDING):  apixaban 5 milliGRAM(s) Oral every 12 hours  atorvastatin 40 milliGRAM(s) Oral at bedtime  fluconAZOLE   Tablet      fluconAZOLE   Tablet 400 milliGRAM(s) Oral once  HYDROmorphone  Injectable 0.25 milliGRAM(s) IV Push every 6 hours  metoprolol tartrate 25 milliGRAM(s) Oral two times a day    MEDICATIONS  (PRN):  acetaminophen   Tablet .. 650 milliGRAM(s) Oral every 8 hours PRN Temp greater or equal to 38C (100.4F), Mild Pain (1 - 3), Moderate Pain (4 - 6)    PRESENT SYMPTOMS: [x ]Unable to obtain due to poor mentation   Source if other than patient:  [ ]Family   [x ]Team     Pain: [ ]yes [x ]no  QOL impact -   Location -                    Aggravating factors -  Quality -  Radiation -  Timing-  Severity (0-10 scale):  Minimal acceptable level (0-10 scale):     PAIN AD Score:     [email or web address] (press ctrl +  left click to view)    Dyspnea:                           [x ]Mild to ]Moderate [ ]Severe  Anxiety:                             [ ]Mild [ ]Moderate [ ]Severe  Fatigue:                             [ ]Mild [ ]Moderate [x ]Severe  Nausea:                             [ ]Mild [ ]Moderate [ ]Severe  Loss of appetite:              [ ]Mild [x ]Moderate [ ]Severe  Constipation:                    [ ]Mild [ ]Moderate [ ]Severe    Other Symptoms:  [ ]All other review of systems negative     Palliative Performance Status Version 2:      20   %    [email or web address]_performance_scale_[email or web address]    Due to COVID 19 and in order to decrease providers exposure and the usage or PPE  the H&P, ROS and/or PE was taken form the primary care teams note.      PHYSICAL EXAM:  taken from H&P and report given by Team 4  Vital Signs Last 24 Hrs  T(C): 36.3 (15 Apr 2020 09:58), Max: 36.8 (2020 17:35)  T(F): 97.4 (15 Apr 2020 09:58), Max: 98.2 (2020 17:35)  HR: 64 (15 Apr 2020 09:58) (54 - 82)  BP: 118/68 (15 Apr 2020 09:58) (118/68 - 167/83)  BP(mean): --  RR: 20 (15 Apr 2020 09:58) (18 - 24)  SpO2: 95% (15 Apr 2020 09:58) (95% - 100%) I&O's Summary    15 Apr 2020 07:01  -  15 Apr 2020 14:00  --------------------------------------------------------  IN: 0 mL / OUT: 0 mL / NET: 0 mL      GENERAL:  [ ]Alert  [ ]Oriented x   [x ]Lethargic  [ ]Cachexia  [ ]Unarousable  [ ] minimally Verbal  [ ]Non-Verbal    Behavioral:   [ ] Anxiety  [ ] Delirium [ ] Agitation [ ] Other    HEENT:  [ ]Normal   [x ]Dry mouth   [ ]ET Tube/Trach  [x ]Oral lesions 2/2 thrush    PULMONARY:   [ x]Clear [ ]Tachypnea  [ ]Audible excessive secretions   [ ]Rhonchi        [ ]Right [ ]Left [ ]Bilateral  [ ]Crackles        [ ]Right [ ]Left [ ]Bilateral  [ ]Wheezing     [ ]Right [ ]Left [ ]Bilateral  [ ]Diminished breath sounds [ ]right [ ]left [ ]bilateral    CARDIOVASCULAR:    [ ]Regular [ x]Irregular [ ]Tachy  [ ][place] [ ]Murmur [ ]Other    GASTROINTESTINAL:  [x ]Soft  [ ]Distended   [ ]+BS  [ ]Non tender [ ]Tender  [ ]PEG [ ]OGT/ NGT  Last BM:   04-15-20 @ 07:01  -  04-15-20 @ 14:00  --------------------------------------------------------  OUT: 0 mL        GENITOURINARY:  [ ]Normal [ ] Incontinent   [ ]Oliguria/Anuria   [ ]Hall    [ ]External cath    MUSCULOSKELETAL:   [ ]Normal   [x ]Weakness  [ x]Bed/Wheelchair bound [ ]Edema    NEUROLOGIC:   [ ]No focal deficits  [ ]Cognitive impairment  [x ]Dysphagia [ ]Dysarthria [ ]Paresis [x ]Other  odynophagia    SKIN:   [x ]Normal    [ ]Rash  [ ]Pressure ulcer(s)       Present on admission [ ]y [ ]n    CRITICAL CARE:  [ ]Shock Present  [ ]Septic [ ]Cardiogenic [ ]Neurologic [ ]Hypovolemic  [ ]  Vasopressors [ ]  Inotropes   [x ]Respiratory failure present [ ]Mechanical ventilation [ ]Non-invasive ventilatory support [ ]High flow  [x ]Acute  [ ]Chronic [x ]Hypoxic  [ ]Hypercarbic [ ]Other  [ ]Other organ failure     LABS:                        10.0   7.47  )-----------( 369      ( 15 Apr 2020 10:01 )             33.1   04-15    145  |  105  |  47<H>  ----------------------------<  127<H>  5.1   |  28  |  1.34<H>    Ca    9.2      15 Apr 2020 10:01  Phos  4.2     04-15  Mg     2.4     04-15    TPro  8.6<H>  /  Alb  2.8<L>  /  TBili  2.0<H>  /  DBili  0.8<H>  /  AST  34  /  ALT  22  /  AlkPhos  101  -15  PT/INR - ( 2020 19:23 )   PT: 29.4 sec;   INR: 2.49 ratio         PTT - ( 2020 19:23 )  PTT:36.5 sec    Urinalysis Basic - ( 2020 19:38 )    Color: Yellow / Appearance: Clear / S.025 / pH: x  Gluc: x / Ketone: Small  / Bili: Negative / Urobili: 4 mg/dL   Blood: x / Protein: 30 mg/dL / Nitrite: Negative   Leuk Esterase: Negative / RBC: 1 /hpf / WBC 1 /HPF   Sq Epi: x / Non Sq Epi: 5 /hpf / Bacteria: Negative          RADIOLOGY & ADDITIONAL STUDIES:    PROTEIN CALORIE MALNUTRITION PRESENT: [ ]mild [ ]moderate [ ]severe [ ]underweight [ ]morbid obesity  https://www.andeal.org/vault/2440/web/files/ONC/Table_Clinical%20Characteristics%20to%20Document%20Malnutrition-White%20JV%20et%20al%145728.pdf      Weight (kg): 57.7 (20 @ 23:27), 74.4 (20 @ 05:04)    [ ]PPSV2 < or = to 30% [ ]significant weight loss  [ ]poor nutritional intake  [ ]anasarca     Albumin, Serum: 2.8 g/dL (04-15-20 @ 10:01)   [ ]Artificial Nutrition      REFERRALS:   [ ]Chaplaincy  [ ]Hospice  [ ]Child Life  [ ]Social Work  [ ]Case management [ ]Holistic Therapy     Goals of Care Document:

## 2020-04-15 NOTE — PROGRESS NOTE ADULT - ATTENDING COMMENTS
Overall improvement from initial presentation. suspect altered mental status secondary to hypoxia.  Remains with O2 requirement of NC 5 lpm with SpO2 95%    alpha thal. H/H stable  d-dimer increasing despite therapeutic AC with apixaban 5mg BID  Tbili and Dbili mildly elevated as is the LDH and haptoglobin; in the setting of inflammation will cont to monitor for now    Given the patient's age, medical comorobidities, COVID-19 infection, will continue with supportive care. defer inititation of IL-1 antagonist and steroids at this time as the respiratory status is stable. As per discussion between the medical team, the pt, and HCP, the goal is for comfort. As such, will provide low-dose opioids for SOB and cough. Hospice evaluation. Overall improvement from initial presentation. suspect altered mental status secondary to hypoxia.  Remains with O2 requirement of NC 5 lpm with SpO2 95%    alpha thal. H/H stable  d-dimer increasing despite therapeutic AC with apixaban 5mg BID  Tbili and Dbili mildly elevated as is the LDH and haptoglobin; in the setting of inflammation will cont to monitor for now    Given the patient's age, medical comorobidities, COVID-19 infection, will continue with supportive care. defer inititation of IL-1 antagonist and steroids at this time as the respiratory status is stable. As per discussion between the medical team, the pt, and HCP, the goal is for comfort. As such, will provide low-dose opioids for SOB and cough. Hospice evaluation.    CPT: 41820  ICD: J12.89, B97.29

## 2020-04-15 NOTE — PROGRESS NOTE ADULT - PROBLEM SELECTOR PLAN 8
- c/w statin Concern for oropharyngeal and esophageal candidiasis given dysphonia and scrapable white plaque  -Fluconazole 400 mg PO once followed by 200 mg daily for 13 days

## 2020-04-15 NOTE — PROGRESS NOTE ADULT - PROBLEM SELECTOR PLAN 2
- could be 2/2 COVID; ?r/o fluid overload, though no edema on exam; weight previous admission 74kg, this admission 57kg; will need to repeat;  - PE less likely given already on eliquis  - c/w supp O2  - hold steroids for now unless clinical decompensation;  - ?start IV lasix Very likely 2/2 COVID  - PE less likely given already on eliquis  - c/w supp O2  - hold steroids for now unless clinical decompensation;  - No lasix given dry mucous membranes

## 2020-04-16 DIAGNOSIS — G93.41 METABOLIC ENCEPHALOPATHY: ICD-10-CM

## 2020-04-16 DIAGNOSIS — U07.1 COVID-19: ICD-10-CM

## 2020-04-16 DIAGNOSIS — J96.00 ACUTE RESPIRATORY FAILURE, UNSPECIFIED WHETHER WITH HYPOXIA OR HYPERCAPNIA: ICD-10-CM

## 2020-04-16 DIAGNOSIS — R06.09 OTHER FORMS OF DYSPNEA: ICD-10-CM

## 2020-04-16 LAB
ALBUMIN SERPL ELPH-MCNC: 2.8 G/DL — LOW (ref 3.3–5)
ALP SERPL-CCNC: 84 U/L — SIGNIFICANT CHANGE UP (ref 40–120)
ALT FLD-CCNC: 18 U/L — SIGNIFICANT CHANGE UP (ref 10–45)
ANION GAP SERPL CALC-SCNC: 10 MMOL/L — SIGNIFICANT CHANGE UP (ref 5–17)
AST SERPL-CCNC: 34 U/L — SIGNIFICANT CHANGE UP (ref 10–40)
BILIRUB SERPL-MCNC: 1.3 MG/DL — HIGH (ref 0.2–1.2)
BUN SERPL-MCNC: 54 MG/DL — HIGH (ref 7–23)
CALCIUM SERPL-MCNC: 9.5 MG/DL — SIGNIFICANT CHANGE UP (ref 8.4–10.5)
CHLORIDE SERPL-SCNC: 106 MMOL/L — SIGNIFICANT CHANGE UP (ref 96–108)
CO2 SERPL-SCNC: 29 MMOL/L — SIGNIFICANT CHANGE UP (ref 22–31)
CREAT SERPL-MCNC: 1.41 MG/DL — HIGH (ref 0.5–1.3)
CRP SERPL-MCNC: 12.43 MG/DL — HIGH (ref 0–0.4)
D DIMER BLD IA.RAPID-MCNC: 1049 NG/ML DDU — HIGH
GLUCOSE SERPL-MCNC: 130 MG/DL — HIGH (ref 70–99)
HCT VFR BLD CALC: 33.3 % — LOW (ref 39–50)
HGB BLD-MCNC: 9.9 G/DL — LOW (ref 13–17)
MAGNESIUM SERPL-MCNC: 2.6 MG/DL — SIGNIFICANT CHANGE UP (ref 1.6–2.6)
MCHC RBC-ENTMCNC: 20.8 PG — LOW (ref 27–34)
MCHC RBC-ENTMCNC: 29.7 GM/DL — LOW (ref 32–36)
MCV RBC AUTO: 70.1 FL — LOW (ref 80–100)
NRBC # BLD: 0 /100 WBCS — SIGNIFICANT CHANGE UP (ref 0–0)
NT-PROBNP SERPL-SCNC: 2749 PG/ML — HIGH (ref 0–300)
PHOSPHATE SERPL-MCNC: 4.9 MG/DL — HIGH (ref 2.5–4.5)
PLATELET # BLD AUTO: 381 K/UL — SIGNIFICANT CHANGE UP (ref 150–400)
POTASSIUM SERPL-MCNC: 4.7 MMOL/L — SIGNIFICANT CHANGE UP (ref 3.5–5.3)
POTASSIUM SERPL-SCNC: 4.7 MMOL/L — SIGNIFICANT CHANGE UP (ref 3.5–5.3)
PROT SERPL-MCNC: 8.5 G/DL — HIGH (ref 6–8.3)
RBC # BLD: 4.75 M/UL — SIGNIFICANT CHANGE UP (ref 4.2–5.8)
RBC # FLD: 17.8 % — HIGH (ref 10.3–14.5)
SODIUM SERPL-SCNC: 145 MMOL/L — SIGNIFICANT CHANGE UP (ref 135–145)
T PALLIDUM AB TITR SER: NEGATIVE — SIGNIFICANT CHANGE UP
WBC # BLD: 6.71 K/UL — SIGNIFICANT CHANGE UP (ref 3.8–10.5)
WBC # FLD AUTO: 6.71 K/UL — SIGNIFICANT CHANGE UP (ref 3.8–10.5)

## 2020-04-16 PROCEDURE — 99232 SBSQ HOSP IP/OBS MODERATE 35: CPT

## 2020-04-16 PROCEDURE — 99233 SBSQ HOSP IP/OBS HIGH 50: CPT

## 2020-04-16 RX ORDER — LANOLIN ALCOHOL/MO/W.PET/CERES
5 CREAM (GRAM) TOPICAL AT BEDTIME
Refills: 0 | Status: DISCONTINUED | OUTPATIENT
Start: 2020-04-16 | End: 2020-04-20

## 2020-04-16 RX ORDER — HYDROMORPHONE HYDROCHLORIDE 2 MG/ML
0.5 INJECTION INTRAMUSCULAR; INTRAVENOUS; SUBCUTANEOUS EVERY 6 HOURS
Refills: 0 | Status: DISCONTINUED | OUTPATIENT
Start: 2020-04-16 | End: 2020-04-18

## 2020-04-16 RX ADMIN — Medication 25 MILLIGRAM(S): at 05:28

## 2020-04-16 RX ADMIN — HYDROMORPHONE HYDROCHLORIDE 0.5 MILLIGRAM(S): 2 INJECTION INTRAMUSCULAR; INTRAVENOUS; SUBCUTANEOUS at 23:33

## 2020-04-16 RX ADMIN — HYDROMORPHONE HYDROCHLORIDE 0.25 MILLIGRAM(S): 2 INJECTION INTRAMUSCULAR; INTRAVENOUS; SUBCUTANEOUS at 00:19

## 2020-04-16 RX ADMIN — HYDROMORPHONE HYDROCHLORIDE 0.5 MILLIGRAM(S): 2 INJECTION INTRAMUSCULAR; INTRAVENOUS; SUBCUTANEOUS at 18:30

## 2020-04-16 RX ADMIN — HYDROMORPHONE HYDROCHLORIDE 0.25 MILLIGRAM(S): 2 INJECTION INTRAMUSCULAR; INTRAVENOUS; SUBCUTANEOUS at 05:27

## 2020-04-16 RX ADMIN — APIXABAN 5 MILLIGRAM(S): 2.5 TABLET, FILM COATED ORAL at 18:03

## 2020-04-16 RX ADMIN — FLUCONAZOLE 100 MILLIGRAM(S): 150 TABLET ORAL at 15:15

## 2020-04-16 RX ADMIN — HYDROMORPHONE HYDROCHLORIDE 0.25 MILLIGRAM(S): 2 INJECTION INTRAMUSCULAR; INTRAVENOUS; SUBCUTANEOUS at 12:30

## 2020-04-16 RX ADMIN — APIXABAN 5 MILLIGRAM(S): 2.5 TABLET, FILM COATED ORAL at 05:28

## 2020-04-16 NOTE — PROGRESS NOTE ADULT - ASSESSMENT
87 yo M MI s/p CABG, HTN, HLD, afib, CVA p/w AMS with hypoxia due to COVID 19 now with complications of PNA due to COVID..  Palliative care called for GOC.

## 2020-04-16 NOTE — PROGRESS NOTE ADULT - PROBLEM SELECTOR PLAN 10
- PCP: [email or web address]  - Dispo: back to rehab pending PT vs. per family wishes  -GOC: Per patient's hcp, goal is home hospice. Will assess ability to take PO medicine and try to wean O2 to these ends. Will confirm with patient and then sign MOLST today.    Destin Church, PGY1  [phone] - PCP: unkown  - Dispo: back to rehab pending PT vs. per family wishes  -GOC: Per patient's hcp, goal is home hospice. Will assess ability to take PO medicine and try to wean O2 to these ends. Palliative care onboard    Destin Church, PGY1  [phone]

## 2020-04-16 NOTE — PROGRESS NOTE ADULT - SUBJECTIVE AND OBJECTIVE BOX
Due to COVID 19 and in order to decrease providers exposure and the usage or PPE  the H&P, ROS and/or PE was taken form the primary care teams note.  This writer is working remotely communication via the phone with team and family to discuss case.    SUBJECTIVE AND OBJECTIVE:  alert to person laying in bed  INTERVAL HPI/OVERNIGHT EVENTS:  pt dyspnic requiring Dilaudid atc    DNR on chart: Yes    Allergies    No Known Allergies    Intolerances    MEDICATIONS  (STANDING):  apixaban 5 milliGRAM(s) Oral every 12 hours  fluconAZOLE IVPB      fluconAZOLE IVPB 200 milliGRAM(s) IV Intermittent every 24 hours  HYDROmorphone  Injectable 0.25 milliGRAM(s) IV Push every 6 hours  metoprolol tartrate 25 milliGRAM(s) Oral two times a day    MEDICATIONS  (PRN):  acetaminophen   Tablet .. 650 milliGRAM(s) Oral every 8 hours PRN Temp greater or equal to 38C (100.4F), Mild Pain (1 - 3), Moderate Pain (4 - 6)      ITEMS UNCHECKED ARE NOT PRESENT    PRESENT SYMPTOMS: [ ]Unable to obtain due to poor mentation   Source if other than patient:  [ ]Family   [x ]Team     Pain:  [ ]yes [x ]no  QOL impact -   Location -                    Aggravating factors -  Quality -  Radiation -  Timing-  Severity (0-10 scale):  Minimal acceptable level (0-10 scale):     Dyspnea:                           [x ]Mild to Moderate [ ]Severe  Anxiety:                             [ ]Mild [ ]Moderate [ ]Severe  Fatigue:                             [x ]Mild [ ]Moderate [ ]Severe  Nausea:                            [ ]Mild [ ]Moderate [ ]Severe  Loss of appetite:               [ ]Mild [ ]Moderate [x ]Severe  Constipation:                    [ ]Mild [ ]Moderate [ ]Severe    PAIN AD Score:	  http://geriatrictoolkit.[place].[email or web address]/painad.pdf (Ctrl + left click to view)    Other Symptoms:  [ ]All other review of systems negative     Palliative Performance Status Version 2:    20%     %      http://npcrc.org/files/news/palliative_performance_scale_ppsv2.pdf    Due to COVID 19 and in order to decrease providers exposure and the usage or PPE  the H&P, ROS and/or PE was taken form the primary care teams note.    PHYSICAL EXAM:  Vital Signs Last 24 Hrs  T(C): 36.3 (2020 05:12), Max: 36.4 (15 Apr 2020 22:33)  T(F): 97.4 (2020 05:12), Max: 97.6 (15 Apr 2020 22:33)  HR: 62 (2020 09:24) (62 - 92)  BP: 159/83 (2020 09:24) (141/80 - 159/83)  BP(mean): --  RR: 20 (2020 05:12) (20 - 20)  SpO2: 96% (2020 05:12) (95% - 97%) I&O's Summary    15 Apr 2020 07:01  -  2020 07:00  --------------------------------------------------------  IN: 150 mL / OUT: 0 mL / NET: 150 mL       GENERAL:  [x ]Alert to person when name is called [ ]Oriented x   [x ]Lethargic  [ ]Cachexia  [ ]Unarousable  [ ]minimally Verbal  [ ]Non-Verbal    Behavioral:   [ ]Anxiety  [ ]Delirium [ ]Agitation [ ]Other    HEENT:  [ ]Normal   [x ]Dry mouth   [ ]ET Tube/Trach  [ ]Oral lesions    PULMONARY:   [ ]Clear [x ]Tachypnea  [ ]Audible excessive secretions   [ ]Rhonchi        [ ]Right [ ]Left [ ]Bilateral  [x ]Crackles        [ ]Right [ ]Left [x ]Bilateral  [ ]Wheezing     [ ]Right [ ]Left [ ]Bilateral  [ ]Diminished BS [ ] Right [ ]Left [ ]Bilateral    CARDIOVASCULAR:    [x ]Regular [ ]Irregular [ ]Tachy  [ ]Santo [ ]Murmur [ ]Other    GASTROINTESTINAL:  [x ]Soft  [ ]Distended   x[ ]+BS  [x ]Non tender [ ]Tender  [ ]PEG [ ]OGT/ NGT   Last BM:   04-15-20 @ 07:01  -  20 @ 07:00  --------------------------------------------------------  OUT: 0 mL      GENITOURINARY:  [ ]Normal [x ]Incontinent   [ ]Oliguria/Anuria   [ ]Hall   [ ] External cath    MUSCULOSKELETAL:   [ ]Normal   x[ ]Weakness  [x ]Bed/Wheelchair bound [ ]Edema    NEUROLOGIC:   [ ]No focal deficits  [x ] Cognitive impairment  [ ] Dysphagia [ ]Dysarthria [ ] Paresis [ ]Other     SKIN:   [x ]Normal  [ ]Rash   [ ]Pressure ulcer(s) [ ]y [ ]n present on admission    CRITICAL CARE:  [ ]Shock Present  [ ]Septic [ ]Cardiogenic [ ]Neurologic [ ]Hypovolemic  [ ]Vasopressors [ ]Inotropes  [x ]Respiratory failure present [ ]Mechanical Ventilation [ ]Non-invasive ventilatory support [ ]High-Flow  [x ]Acute  [ ]Chronic [ ]Hypoxic  [ ]Hypercarbic [ ]Other  [ ]Other organ failure     LABS:                        9.9    6.71  )-----------( 381      ( 2020 07:14 )             33.3   04-16    145  |  106  |  54<H>  ----------------------------<  130<H>  4.7   |  29  |  1.41<H>    Ca    9.5      2020 07:14  Phos  4.9     04-16  Mg     2.6     04-16    TPro  8.5<H>  /  Alb  2.8<L>  /  TBili  1.3<H>  /  DBili  x   /  AST  34  /  ALT  18  /  AlkPhos  84  04-16  PT/INR - ( 2020 19:23 )   PT: 29.4 sec;   INR: 2.49 ratio         PTT - ( 2020 19:23 )  PTT:36.5 sec    Urinalysis Basic - ( 2020 19:38 )    Color: Yellow / Appearance: Clear / S.025 / pH: x  Gluc: x / Ketone: Small  / Bili: Negative / Urobili: 4 mg/dL   Blood: x / Protein: 30 mg/dL / Nitrite: Negative   Leuk Esterase: Negative / RBC: 1 /hpf / WBC 1 /HPF   Sq Epi: x / Non Sq Epi: 5 /hpf / Bacteria: Negative      RADIOLOGY & ADDITIONAL STUDIES:    Protein Calorie Malnutrition Present: [ ]mild [ ]moderate [ ]severe [ ]underweight [ ]morbid obesity  https://www.andeal.org/vault/2440/web/files/ONC/Table_Clinical%20Characteristics%20to%20Document%20Malnutrition-White%20JV%20et%20al%2020.pdf      Weight (kg): 57.7 (20 @ 23:27), 74.4 (20 @ 05:04)    [ ]PPSV2 < or = 30%  [ ]significant weight loss [ ]poor nutritional intake [ ]anasarca   Albumin, Serum: 2.8 g/dL (20 @ 07:14)   [ ]Artificial Nutrition    REFERRALS:   [ ]Chaplaincy  [ ]Hospice  [ ]Child Life  [ ]Social Work  [ ]Case management [ ]Holistic Therapy     Goals of Care Document:

## 2020-04-16 NOTE — PROGRESS NOTE ADULT - ATTENDING COMMENTS
Remains with NC O2 requirement. Oxygenation appears stable though increased WOB. Reports he continues to not feel well but currently denies any pain.     Given the patient's age, medical comorbidities, COVID-19 infection, will continue with supportive care. As per prior discussions, the goal is for comfort. As such, will provide low-dose opioids for SOB and cough. decrease medication burden as necessary, which includes stopping of statin. Ongoing hospice evaluation. DNR/DNI    CPT: 93995  ICD: J12.89, B97.29

## 2020-04-16 NOTE — PROGRESS NOTE ADULT - PROBLEM SELECTOR PLAN 3
- s/p plaquinel on prior admission  - was desaturating to 80s per chart in rehab  - currently saturating well on 4L NC  - could consider steroids or anakinra (renally dosed) if no further improvement  - c/w eliquis full dose for AC  - trend CRP, ferritin, d-dimer, LDH, procalcitonin  - if worsening, will get CT chest - s/p plaquenil on prior admission  - was desaturating to 80s per chart in rehab  - currently saturating well on 4L NC  - could consider steroids or anakinra (renally dosed) if no further improvement  - c/w eliquis full dose for AC  - will clarify with family regarding goal of care. Will trend CRP, ferritin, d-dimer, LDH, procalcitonin if consistent with goal of care  - if worsening, will get CT chest if consistent with goal of care

## 2020-04-16 NOTE — PROGRESS NOTE ADULT - PROBLEM SELECTOR PLAN 9
Per HCP, patient is DNR/DNI and goal is to aim for hospice care  -Complains of shortness of breath but no pain despite improved O2 saturation.  -Dilaudid 0.25 mg IV q6h for air hunger Per HCP, patient is DNR/DNI and goal is to aim for hospice care  -Complains of shortness of breath but no pain despite improved O2 saturation.  -Dilaudid 0.25 mg IV q6h for air hunger  -palliative care onboard

## 2020-04-16 NOTE — PROGRESS NOTE ADULT - PROBLEM SELECTOR PROBLEM 3
Subjective:       Patient ID: Camryn Gomes is a 57 y.o. female.    Chief Complaint: Follow-up (3 month)    Three-month follow-up for ADD medications refilled to include allergic dermatitis fungal infection skin infection, nicotine dependence with COPD.  Adderall continues work well no side effects.  She uses topical medications on a regular basis.  She is not currently smoking cigarettes.  She had a recent injury to her back being followed by pain management.  She is also followed by Rheumatology.  She denies headache chest pain palpitations shortness of breath or edema.    Review of Systems   Constitutional: Negative for appetite change, chills, fever and unexpected weight change.   Respiratory: Negative for cough, chest tightness and shortness of breath.    Cardiovascular: Negative for chest pain, palpitations and leg swelling.        Denies lightheadedness   Gastrointestinal: Negative for abdominal distention, abdominal pain, nausea and vomiting.   Musculoskeletal: Positive for back pain.   Neurological: Positive for headaches. Negative for dizziness, syncope, weakness and light-headedness.       Objective:      Physical Exam   Constitutional: She is oriented to person, place, and time. She appears well-developed and well-nourished. No distress.   Neck: Neck supple. No JVD present. No tracheal deviation present. No thyromegaly present.   Cardiovascular: Normal rate, regular rhythm and normal heart sounds. Exam reveals no gallop.   No murmur heard.  Pulmonary/Chest: Effort normal and breath sounds normal. No respiratory distress. She has no wheezes. She has no rales.   Abdominal: Soft. Bowel sounds are normal. She exhibits no mass. There is no tenderness.   Lymphadenopathy:     She has no cervical adenopathy.   Neurological: She is alert and oriented to person, place, and time.   Skin: She is not diaphoretic.   Psychiatric: She has a normal mood and affect. Her behavior is normal. Judgment and thought content  normal.       Office Visit on 08/05/2019   Component Date Value Ref Range Status    Aerobic Bacterial Culture 08/05/2019 Skin shae,  no predominant organism   Final     Assessment:       1. Breast screening    2. Allergic dermatitis    3. Other migraine without status migrainosus, intractable    4. Skin infection    5. Tinea versicolor    6. Anxiety    7. Ankylosing spondylitis, unspecified site of spine    8. Lumbar and sacral osteoarthritis    9. Psoriatic arthritis    10. Gastroesophageal reflux disease with esophagitis    11. Psoriasis    12. Chronic obstructive pulmonary disease, unspecified COPD type    13. Attention deficit disorder, unspecified hyperactivity presence    14. Encounter for screening mammogram for malignant neoplasm of breast     15. Colon cancer screening        Plan:     Medications reviewed and refilled.  Health maintenance reviewed.  Mammogram ordered.  Fecal immunoassay test ordered.  Follow-up in 3 months.    Breast screening  -     Mammo Digital Screening Bilat; Future; Expected date: 12/12/2019    Allergic dermatitis  -     triamcinolone acetonide 0.1% (KENALOG) 0.1 % cream; APPLY EXTERNALLY TO THE AFFECTED AREA TWICE DAILY  Dispense: 30 g; Refill: 2    Other migraine without status migrainosus, intractable  -     topiramate (TOPAMAX) 25 MG tablet; TAKE 1 TABLET(25 MG) BY MOUTH TWICE DAILY  Dispense: 60 tablet; Refill: 5    Skin infection  -     silver sulfADIAZINE 1% (SILVADENE) 1 % cream; Apply topically 2 (two) times daily.  Dispense: 50 g; Refill: 2  -     mupirocin (BACTROBAN) 2 % ointment; Apply topically 2 (two) times daily.  Dispense: 22 g; Refill: 2    Tinea versicolor  -     ketoconazole (NIZORAL) 2 % cream; Qd  Dispense: 45 g; Refill: 0    Anxiety  -     diazePAM (VALIUM) 5 MG tablet; Take 1 tablet (5 mg total) by mouth 2 (two) times daily as needed for Anxiety.  Dispense: 60 tablet; Refill: 2  -     clonazePAM (KLONOPIN) 2 MG Tab; Take 1 tablet (2 mg total) by mouth once  daily.  Dispense: 30 tablet; Refill: 2    Ankylosing spondylitis, unspecified site of spine  -     clonazePAM (KLONOPIN) 2 MG Tab; Take 1 tablet (2 mg total) by mouth once daily.  Dispense: 30 tablet; Refill: 2    Lumbar and sacral osteoarthritis  -     clonazePAM (KLONOPIN) 2 MG Tab; Take 1 tablet (2 mg total) by mouth once daily.  Dispense: 30 tablet; Refill: 2    Psoriatic arthritis  -     clonazePAM (KLONOPIN) 2 MG Tab; Take 1 tablet (2 mg total) by mouth once daily.  Dispense: 30 tablet; Refill: 2    Gastroesophageal reflux disease with esophagitis  -     clonazePAM (KLONOPIN) 2 MG Tab; Take 1 tablet (2 mg total) by mouth once daily.  Dispense: 30 tablet; Refill: 2    Psoriasis  -     clobetasol (TEMOVATE) 0.05 % external solution; Apply topically 2 (two) times daily.  Dispense: 50 mL; Refill: 2    Chronic obstructive pulmonary disease, unspecified COPD type  -     albuterol (VENTOLIN HFA) 90 mcg/actuation inhaler; Inhale 2 puffs into the lungs every 6 (six) hours. Rescue  Dispense: 18 g; Refill: 2    Attention deficit disorder, unspecified hyperactivity presence  -     Discontinue: dextroamphetamine-amphetamine (ADDERALL) 30 mg Tab; Take 1 tablet (30 mg total) by mouth 2 (two) times daily.  Dispense: 60 tablet; Refill: 0  -     Discontinue: dextroamphetamine-amphetamine (ADDERALL) 30 mg Tab; Take 1 tablet (30 mg total) by mouth 2 (two) times daily.  Dispense: 60 tablet; Refill: 0  -     dextroamphetamine-amphetamine (ADDERALL) 30 mg Tab; Take 1 tablet (30 mg total) by mouth 2 (two) times daily.  Dispense: 60 tablet; Refill: 0    Encounter for screening mammogram for malignant neoplasm of breast   -     Mammo Digital Screening Bilat; Future; Expected date: 12/12/2019    Colon cancer screening  -     Fecal Immunochemical Test (iFOBT); Future; Expected date: 12/12/2019    Other orders  -     clobetasol 0.05% (TEMOVATE) 0.05 % Oint; Apply topically 2 (two) times daily.  Dispense: 45 g; Refill: 2           COVID-19 virus infection

## 2020-04-16 NOTE — PROGRESS NOTE ADULT - PROBLEM SELECTOR PLAN 1
Suspect 2/2 hypoxia and tachypnea/respiratory failure in setting of recent COVID on top of history of pulmonary fibrosis  - also with age-indeterminate pontine infarct on CT head; no focal neurological deficit  - consider adding ASA 81 once patient recovers from COVID   - treat COVID as below  - recent TSH wnl. F/u B12, folate, RPR for acute encephalopathy. Although, now resolved. Suspect 2/2 hypoxia and tachypnea/respiratory failure in setting of recent COVID on top of history of pulmonary fibrosis  - also with age-indeterminate pontine infarct on CT head; no focal neurological deficit  - consider adding ASA 81 once patient recovers from COVID   - treat COVID as below  - recent TSH, B12, folate, RPR wnl

## 2020-04-16 NOTE — PROGRESS NOTE ADULT - ASSESSMENT
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 Palliative onboard for goal of care/dispo planning. Discussed with grand-daughter: patient is DNR/DNI, plan is to [facility]te and eventually home with home hospice. Trend labs every 3 days.

## 2020-04-16 NOTE — PROGRESS NOTE ADULT - PROBLEM SELECTOR PLAN 2
Very likely 2/2 COVID  - PE less likely given already on eliquis  - c/w supp O2  - hold steroids for now unless clinical decompensation;  - No lasix given dry mucous membranes

## 2020-04-16 NOTE — PROGRESS NOTE ADULT - SUBJECTIVE AND OBJECTIVE BOX
PROGRESS NOTE:     CONTACT INFO:   Eusebio Pollack (Xiao)   NS: [phone]    Patient is a 86y old  Male who presents with a chief complaint of encephalopathy, hypoxia, respiratory failure (15 Apr 2020 13:58)      SUBJECTIVE / OVERNIGHT EVENTS: no acute event overnight. This morning,     ADDITIONAL REVIEW OF SYSTEMS:    MEDICATIONS  (STANDING):  apixaban 5 milliGRAM(s) Oral every 12 hours  atorvastatin 40 milliGRAM(s) Oral at bedtime  fluconAZOLE IVPB      fluconAZOLE IVPB 200 milliGRAM(s) IV Intermittent every 24 hours  HYDROmorphone  Injectable 0.25 milliGRAM(s) IV Push every 6 hours  metoprolol tartrate 25 milliGRAM(s) Oral two times a day    MEDICATIONS  (PRN):  acetaminophen   Tablet .. 650 milliGRAM(s) Oral every 8 hours PRN Temp greater or equal to 38C (100.4F), Mild Pain (1 - 3), Moderate Pain (4 - 6)      CAPILLARY BLOOD GLUCOSE        I&O's Summary    15 Apr 2020 07:01  -  2020 07:00  --------------------------------------------------------  IN: 150 mL / OUT: 0 mL / NET: 150 mL        PHYSICAL EXAM:  Vital Signs Last 24 Hrs  T(C): 36.3 (2020 05:12), Max: 36.4 (15 Apr 2020 22:33)  T(F): 97.4 (2020 05:12), Max: 97.6 (15 Apr 2020 22:33)  HR: 75 (2020 05:12) (64 - 92)  BP: 143/63 (2020 05:12) (118/68 - 144/94)  BP(mean): --  RR: 20 (2020 05:12) (20 - 20)  SpO2: 96% (2020 05:12) (95% - 97%)    CONSTITUTIONAL: NAD, well-developed  RESPIRATORY: Normal respiratory effort; lungs are clear to auscultation bilaterally  CARDIOVASCULAR: Regular rate and rhythm, normal S1 and S2, no murmur/rub/gallop; No lower extremity edema; Peripheral pulses are 2+ bilaterally  ABDOMEN: Nontender to palpation, normoactive bowel sounds, no rebound/guarding; No hepatosplenomegaly  MUSCLOSKELETAL: no clubbing or cyanosis of digits; no joint swelling or tenderness to palpation  PSYCH: A+O to person, place, and time; affect appropriate    LABS:                        9.9    6.71  )-----------( 381      ( 2020 07:14 )             33.3     04-16    145  |  106  |  54<H>  ----------------------------<  130<H>  4.7   |  29  |  1.41<H>    Ca    9.5      2020 07:14  Phos  4.9     04-16  Mg     2.6     04-16    TPro  8.5<H>  /  Alb  2.8<L>  /  TBili  1.3<H>  /  DBili  x   /  AST  34  /  ALT  18  /  AlkPhos  84  04-16    PT/INR - ( 2020 19:23 )   PT: 29.4 sec;   INR: 2.49 ratio         PTT - ( 2020 19:23 )  PTT:36.5 sec      Urinalysis Basic - ( 2020 19:38 )    Color: Yellow / Appearance: Clear / S.025 / pH: x  Gluc: x / Ketone: Small  / Bili: Negative / Urobili: 4 mg/dL   Blood: x / Protein: 30 mg/dL / Nitrite: Negative   Leuk Esterase: Negative / RBC: 1 /hpf / WBC 1 /HPF   Sq Epi: x / Non Sq Epi: 5 /hpf / Bacteria: Negative        Culture - Blood (collected 15 Apr 2020 00:20)  Source: .Blood Blood  Preliminary Report (2020 01:02):    No growth to date.    Culture - Blood (collected 15 Apr 2020 00:20)  Source: .Blood Blood  Preliminary Report (2020 01:02):    No growth to date.    Culture - Urine (collected 2020 22:11)  Source: .Urine Clean Catch (Midstream)  Final Report (15 Apr 2020 18:00):    <10,000 CFU/mL Normal Urogenital Rdaha        RADIOLOGY & ADDITIONAL TESTS:  Results Reviewed:   Imaging Personally Reviewed:  Electrocardiogram Personally Reviewed:    COORDINATION OF CARE:  Care Discussed with Consultants/Other Providers [Y/N]:  Prior or Outpatient Records Reviewed [Y/N]: PROGRESS NOTE:     CONTACT INFO:   Eusebio Pollack (Xiao)   NS: [phone]    Patient is a 86y old  Male who presents with a chief complaint of encephalopathy, hypoxia, respiratory failure (15 Apr 2020 13:58)      SUBJECTIVE / OVERNIGHT EVENTS: no acute event overnight. This morning, denies general pain, fever, SOB or CP. Limited ROS due to mental status.    ADDITIONAL REVIEW OF SYSTEMS:    MEDICATIONS  (STANDING):  apixaban 5 milliGRAM(s) Oral every 12 hours  atorvastatin 40 milliGRAM(s) Oral at bedtime  fluconAZOLE IVPB      fluconAZOLE IVPB 200 milliGRAM(s) IV Intermittent every 24 hours  HYDROmorphone  Injectable 0.25 milliGRAM(s) IV Push every 6 hours  metoprolol tartrate 25 milliGRAM(s) Oral two times a day    MEDICATIONS  (PRN):  acetaminophen   Tablet .. 650 milliGRAM(s) Oral every 8 hours PRN Temp greater or equal to 38C (100.4F), Mild Pain (1 - 3), Moderate Pain (4 - 6)      CAPILLARY BLOOD GLUCOSE        I&O's Summary    15 Apr 2020 07:01  -  2020 07:00  --------------------------------------------------------  IN: 150 mL / OUT: 0 mL / NET: 150 mL        PHYSICAL EXAM:  Vital Signs Last 24 Hrs  T(C): 36.3 (2020 05:12), Max: 36.4 (15 Apr 2020 22:33)  T(F): 97.4 (2020 05:12), Max: 97.6 (15 Apr 2020 22:33)  HR: 75 (2020 05:12) (64 - 92)  BP: 143/63 (2020 05:12) (118/68 - 144/94)  BP(mean): --  RR: 20 (2020 05:12) (20 - 20)  SpO2: 96% (2020 05:12) (95% - 97%)    CONSTITUTIONAL: NAD, lethargic  RESPIRATORY: Normal respiratory effort; lungs are clear to auscultation bilaterally  CARDIOVASCULAR: Regular rate and rhythm, No lower extremity edema  ABDOMEN: Soft, nondistended, Nontender to palpation, no rebound/guarding  MUSCLOSKELETAL: no joint swelling or tenderness to palpation  PSYCH: A+O to person; affect appropriate    LABS:                        9.9    6.71  )-----------( 381      ( 2020 07:14 )             33.3     04-16    145  |  106  |  54<H>  ----------------------------<  130<H>  4.7   |  29  |  1.41<H>    Ca    9.5      2020 07:14  Phos  4.9     04-16  Mg     2.6     04-16    TPro  8.5<H>  /  Alb  2.8<L>  /  TBili  1.3<H>  /  DBili  x   /  AST  34  /  ALT  18  /  AlkPhos  84  04-16    PT/INR - ( 2020 19:23 )   PT: 29.4 sec;   INR: 2.49 ratio         PTT - ( 2020 19:23 )  PTT:36.5 sec      Urinalysis Basic - ( 2020 19:38 )    Color: Yellow / Appearance: Clear / S.025 / pH: x  Gluc: x / Ketone: Small  / Bili: Negative / Urobili: 4 mg/dL   Blood: x / Protein: 30 mg/dL / Nitrite: Negative   Leuk Esterase: Negative / RBC: 1 /hpf / WBC 1 /HPF   Sq Epi: x / Non Sq Epi: 5 /hpf / Bacteria: Negative        Culture - Blood (collected 15 Apr 2020 00:20)  Source: .Blood Blood  Preliminary Report (2020 01:02):    No growth to date.    Culture - Blood (collected 15 Apr 2020 00:20)  Source: .Blood Blood  Preliminary Report (2020 01:02):    No growth to date.    Culture - Urine (collected 2020 22:11)  Source: .Urine Clean Catch (Midstream)  Final Report (15 Apr 2020 18:00):    <10,000 CFU/mL Normal Urogenital Radha        RADIOLOGY & ADDITIONAL TESTS:  Results Reviewed:   Imaging Personally Reviewed:  Electrocardiogram Personally Reviewed:    COORDINATION OF CARE:  Care Discussed with Consultants/Other Providers [Y/N]:  Prior or Outpatient Records Reviewed [Y/N]: PROGRESS NOTE:     CONTACT INFO:   Eusebio Pollack (Xiao)   NS: [phone]    Patient is a 86y old  Male who presents with a chief complaint of encephalopathy, hypoxia, respiratory failure (15 Apr 2020 13:58)      SUBJECTIVE / OVERNIGHT EVENTS: no acute event overnight. This morning, denies general pain, fever, SOB or CP. Limited ROS due to mental status.    ADDITIONAL REVIEW OF SYSTEMS:    MEDICATIONS  (STANDING):  apixaban 5 milliGRAM(s) Oral every 12 hours  atorvastatin 40 milliGRAM(s) Oral at bedtime  fluconAZOLE IVPB      fluconAZOLE IVPB 200 milliGRAM(s) IV Intermittent every 24 hours  HYDROmorphone  Injectable 0.25 milliGRAM(s) IV Push every 6 hours  metoprolol tartrate 25 milliGRAM(s) Oral two times a day    MEDICATIONS  (PRN):  acetaminophen   Tablet .. 650 milliGRAM(s) Oral every 8 hours PRN Temp greater or equal to 38C (100.4F), Mild Pain (1 - 3), Moderate Pain (4 - 6)      CAPILLARY BLOOD GLUCOSE        I&O's Summary    15 Apr 2020 07:01  -  2020 07:00  --------------------------------------------------------  IN: 150 mL / OUT: 0 mL / NET: 150 mL        PHYSICAL EXAM:  Vital Signs Last 24 Hrs  T(C): 36.3 (2020 05:12), Max: 36.4 (15 Apr 2020 22:33)  T(F): 97.4 (2020 05:12), Max: 97.6 (15 Apr 2020 22:33)  HR: 75 (2020 05:12) (64 - 92)  BP: 143/63 (2020 05:12) (118/68 - 144/94)  BP(mean): --  RR: 20 (2020 05:12) (20 - 20)  SpO2: 96% (2020 05:12) (95% - 97%)    CONSTITUTIONAL: NAD, lethargic  RESPIRATORY: tachypneic, Decreased breath sounds and fine crackles throughout  CARDIOVASCULAR: no murmurs, No lower extremity edema  ABDOMEN: Soft, nondistended, Nontender to palpation, no rebound/guarding  MUSCLOSKELETAL: no joint swelling or tenderness to palpation  PSYCH: A+O to person; affect appropriate    LABS:                        9.9    6.71  )-----------( 381      ( 2020 07:14 )             33.3     04-16    145  |  106  |  54<H>  ----------------------------<  130<H>  4.7   |  29  |  1.41<H>    Ca    9.5      2020 07:14  Phos  4.9     04-16  Mg     2.6     04-16    TPro  8.5<H>  /  Alb  2.8<L>  /  TBili  1.3<H>  /  DBili  x   /  AST  34  /  ALT  18  /  AlkPhos  84  04-16    PT/INR - ( 2020 19:23 )   PT: 29.4 sec;   INR: 2.49 ratio         PTT - ( 2020 19:23 )  PTT:36.5 sec      Urinalysis Basic - ( 2020 19:38 )    Color: Yellow / Appearance: Clear / S.025 / pH: x  Gluc: x / Ketone: Small  / Bili: Negative / Urobili: 4 mg/dL   Blood: x / Protein: 30 mg/dL / Nitrite: Negative   Leuk Esterase: Negative / RBC: 1 /hpf / WBC 1 /HPF   Sq Epi: x / Non Sq Epi: 5 /hpf / Bacteria: Negative        Culture - Blood (collected 15 Apr 2020 00:20)  Source: .Blood Blood  Preliminary Report (2020 01:02):    No growth to date.    Culture - Blood (collected 15 Apr 2020 00:20)  Source: .Blood Blood  Preliminary Report (2020 01:02):    No growth to date.    Culture - Urine (collected 2020 22:11)  Source: .Urine Clean Catch (Midstream)  Final Report (15 Apr 2020 18:00):    <10,000 CFU/mL Normal Urogenital Radha        RADIOLOGY & ADDITIONAL TESTS:  Results Reviewed:   Imaging Personally Reviewed:  Electrocardiogram Personally Reviewed:    COORDINATION OF CARE:  Care Discussed with Consultants/Other Providers [Y/N]:  Prior or Outpatient Records Reviewed [Y/N]:

## 2020-04-16 NOTE — PROGRESS NOTE ADULT - PROBLEM SELECTOR PLAN 2
medical management as per primary team  currently on dilaudid 0.25 mg ivp q6h atc but pt experiencing end dose failure  will increase dilaudid to 0.5mg ivp q6h atc

## 2020-04-16 NOTE — PROGRESS NOTE ADULT - PROBLEM SELECTOR PLAN 8
Spoke with primary team (resident Eusebio Pollack) regarding update on condition.  Pt with end dose failure of dilaudid will increase dose to 0.5mg q6h atc to help with dyspnea symptom.  Called pts granddaughter, she was unable to talk. She will call me back.

## 2020-04-16 NOTE — PROGRESS NOTE ADULT - PROBLEM SELECTOR PLAN 4
- on CTH, pontine lacunar infarct of undetermined etiology  - further GOC discussion with family  - per family: would like palliative c/s for further GOC and planning (considering hospice inpt vs. home?) Currently DNR/DNI - on CTH, pontine lacunar infarct of undetermined etiology  - palliative onboard for further GOC discussion with family   - Currently DNR/DNI

## 2020-04-16 NOTE — PROGRESS NOTE ADULT - PROBLEM SELECTOR PLAN 8
Concern for oropharyngeal and esophageal candidiasis given dysphonia and scrapable white plaque  -Fluconazole 400 mg PO once followed by 200 mg daily for 13 days

## 2020-04-17 PROCEDURE — 99232 SBSQ HOSP IP/OBS MODERATE 35: CPT

## 2020-04-17 RX ADMIN — HYDROMORPHONE HYDROCHLORIDE 0.5 MILLIGRAM(S): 2 INJECTION INTRAMUSCULAR; INTRAVENOUS; SUBCUTANEOUS at 05:48

## 2020-04-17 RX ADMIN — HYDROMORPHONE HYDROCHLORIDE 0.5 MILLIGRAM(S): 2 INJECTION INTRAMUSCULAR; INTRAVENOUS; SUBCUTANEOUS at 23:11

## 2020-04-17 RX ADMIN — HYDROMORPHONE HYDROCHLORIDE 0.5 MILLIGRAM(S): 2 INJECTION INTRAMUSCULAR; INTRAVENOUS; SUBCUTANEOUS at 17:09

## 2020-04-17 RX ADMIN — Medication 25 MILLIGRAM(S): at 05:48

## 2020-04-17 RX ADMIN — Medication 25 MILLIGRAM(S): at 17:09

## 2020-04-17 RX ADMIN — APIXABAN 5 MILLIGRAM(S): 2.5 TABLET, FILM COATED ORAL at 17:09

## 2020-04-17 RX ADMIN — FLUCONAZOLE 100 MILLIGRAM(S): 150 TABLET ORAL at 21:55

## 2020-04-17 RX ADMIN — APIXABAN 5 MILLIGRAM(S): 2.5 TABLET, FILM COATED ORAL at 05:48

## 2020-04-17 NOTE — PROGRESS NOTE ADULT - PROBLEM SELECTOR PLAN 9
Per HCP, patient is DNR/DNI and goal is to aim for hospice care  -Complains of shortness of breath but no pain despite improved O2 saturation.  -Dilaudid 0.25 mg IV q6h for air hunger  -palliative care onboard Per HCP, patient is DNR/DNI and goal is to aim for hospice care  -Complains of shortness of breath but no pain despite improved O2 saturation.  -increased to Dilaudid 0.5 mg IV q6h for air hunger yesterday  -palliative care onboard

## 2020-04-17 NOTE — PROGRESS NOTE ADULT - PROBLEM SELECTOR PLAN 4
- on CTH, pontine lacunar infarct of undetermined etiology  - palliative onboard for further GOC discussion with family   - Currently DNR/DNI - on CTH, pontine lacunar infarct of undetermined etiology  - palliative following. Discussed goal of care with family, doesn't want to pursue further w/u for the stroke. Plan is to transfer to [facility] and eventually home with hospice  - Currently DNR/DNI

## 2020-04-17 NOTE — PROGRESS NOTE ADULT - SUBJECTIVE AND OBJECTIVE BOX
PROGRESS NOTE:     CONTACT INFO:   Eusebio Pollack (Xiao)   NS: [phone]    Patient is a 86y old  Male who presents with a chief complaint of encephalopathy, hypoxia, respiratory failure (16 Apr 2020 11:58)      SUBJECTIVE / OVERNIGHT EVENTS:    ADDITIONAL REVIEW OF SYSTEMS:    MEDICATIONS  (STANDING):  apixaban 5 milliGRAM(s) Oral every 12 hours  fluconAZOLE IVPB      fluconAZOLE IVPB 200 milliGRAM(s) IV Intermittent every 24 hours  HYDROmorphone  Injectable 0.5 milliGRAM(s) IV Push every 6 hours  metoprolol tartrate 25 milliGRAM(s) Oral two times a day    MEDICATIONS  (PRN):  acetaminophen   Tablet .. 650 milliGRAM(s) Oral every 8 hours PRN Temp greater or equal to 38C (100.4F), Mild Pain (1 - 3), Moderate Pain (4 - 6)  melatonin 5 milliGRAM(s) Oral at bedtime PRN Insomnia      CAPILLARY BLOOD GLUCOSE        I&O's Summary    16 Apr 2020 07:01  -  17 Apr 2020 07:00  --------------------------------------------------------  IN: 220 mL / OUT: 0 mL / NET: 220 mL        PHYSICAL EXAM:  Vital Signs Last 24 Hrs  T(C): 36.6 (17 Apr 2020 06:51), Max: 36.6 (17 Apr 2020 06:51)  T(F): 97.8 (17 Apr 2020 06:51), Max: 97.8 (17 Apr 2020 06:51)  HR: 74 (17 Apr 2020 06:51) (54 - 79)  BP: 129/89 (17 Apr 2020 06:51) (129/89 - 159/83)  BP(mean): --  RR: 20 (17 Apr 2020 06:51) (20 - 22)  SpO2: 100% (17 Apr 2020 06:51) (95% - 100%)    CONSTITUTIONAL: NAD, well-developed  RESPIRATORY: Normal respiratory effort; lungs are clear to auscultation bilaterally  CARDIOVASCULAR: Regular rate and rhythm, normal S1 and S2, no murmur/rub/gallop; No lower extremity edema; Peripheral pulses are 2+ bilaterally  ABDOMEN: Nontender to palpation, normoactive bowel sounds, no rebound/guarding; No hepatosplenomegaly  MUSCLOSKELETAL: no clubbing or cyanosis of digits; no joint swelling or tenderness to palpation  PSYCH: A+O to person, place, and time; affect appropriate    LABS:                        9.9    6.71  )-----------( 381      ( 16 Apr 2020 07:14 )             33.3     04-16    145  |  106  |  54<H>  ----------------------------<  130<H>  4.7   |  29  |  1.41<H>    Ca    9.5      16 Apr 2020 07:14  Phos  4.9     04-16  Mg     2.6     04-16    TPro  8.5<H>  /  Alb  2.8<L>  /  TBili  1.3<H>  /  DBili  x   /  AST  34  /  ALT  18  /  AlkPhos  84  04-16              Culture - Blood (collected 15 Apr 2020 00:20)  Source: .Blood Blood  Preliminary Report (16 Apr 2020 01:02):    No growth to date.    Culture - Blood (collected 15 Apr 2020 00:20)  Source: .Blood Blood  Preliminary Report (16 Apr 2020 01:02):    No growth to date.    Culture - Urine (collected 14 Apr 2020 22:11)  Source: .Urine Clean Catch (Midstream)  Final Report (15 Apr 2020 18:00):    <10,000 CFU/mL Normal Urogenital Radha        RADIOLOGY & ADDITIONAL TESTS:  Results Reviewed:   Imaging Personally Reviewed:  Electrocardiogram Personally Reviewed:    COORDINATION OF CARE:  Care Discussed with Consultants/Other Providers [Y/N]:  Prior or Outpatient Records Reviewed [Y/N]: PROGRESS NOTE:     CONTACT INFO:   Eusebio Pollack (Xiao)   NS: [phone]    Patient is a 86y old  Male who presents with a chief complaint of encephalopathy, hypoxia, respiratory failure (16 Apr 2020 11:58)      SUBJECTIVE / OVERNIGHT EVENTS: no acute event overnight. Per nursing, patient was awake and alert last night. This morning, patient reports feeling cold but otherwise denies pain. Had a BM this morning.     ADDITIONAL REVIEW OF SYSTEMS:    MEDICATIONS  (STANDING):  apixaban 5 milliGRAM(s) Oral every 12 hours  fluconAZOLE IVPB      fluconAZOLE IVPB 200 milliGRAM(s) IV Intermittent every 24 hours  HYDROmorphone  Injectable 0.5 milliGRAM(s) IV Push every 6 hours  metoprolol tartrate 25 milliGRAM(s) Oral two times a day    MEDICATIONS  (PRN):  acetaminophen   Tablet .. 650 milliGRAM(s) Oral every 8 hours PRN Temp greater or equal to 38C (100.4F), Mild Pain (1 - 3), Moderate Pain (4 - 6)  melatonin 5 milliGRAM(s) Oral at bedtime PRN Insomnia      CAPILLARY BLOOD GLUCOSE        I&O's Summary    16 Apr 2020 07:01  -  17 Apr 2020 07:00  --------------------------------------------------------  IN: 220 mL / OUT: 0 mL / NET: 220 mL        PHYSICAL EXAM:  Vital Signs Last 24 Hrs  T(C): 36.6 (17 Apr 2020 06:51), Max: 36.6 (17 Apr 2020 06:51)  T(F): 97.8 (17 Apr 2020 06:51), Max: 97.8 (17 Apr 2020 06:51)  HR: 74 (17 Apr 2020 06:51) (54 - 79)  BP: 129/89 (17 Apr 2020 06:51) (129/89 - 159/83)  BP(mean): --  RR: 20 (17 Apr 2020 06:51) (20 - 22)  SpO2: 100% (17 Apr 2020 06:51) (95% - 100%)    CONSTITUTIONAL: NAD, well-developed  RESPIRATORY: Normal respiratory effort; lungs are clear to auscultation bilaterally  CARDIOVASCULAR: Regular rate and rhythm, normal S1 and S2, no murmur/rub/gallop; No lower extremity edema; Peripheral pulses are 2+ bilaterally  ABDOMEN: Nontender to palpation, normoactive bowel sounds, no rebound/guarding; No hepatosplenomegaly  MUSCLOSKELETAL: no clubbing or cyanosis of digits; no joint swelling or tenderness to palpation  PSYCH: A+O to person, place, and time; affect appropriate    LABS:                        9.9    6.71  )-----------( 381      ( 16 Apr 2020 07:14 )             33.3     04-16    145  |  106  |  54<H>  ----------------------------<  130<H>  4.7   |  29  |  1.41<H>    Ca    9.5      16 Apr 2020 07:14  Phos  4.9     04-16  Mg     2.6     04-16    TPro  8.5<H>  /  Alb  2.8<L>  /  TBili  1.3<H>  /  DBili  x   /  AST  34  /  ALT  18  /  AlkPhos  84  04-16              Culture - Blood (collected 15 Apr 2020 00:20)  Source: .Blood Blood  Preliminary Report (16 Apr 2020 01:02):    No growth to date.    Culture - Blood (collected 15 Apr 2020 00:20)  Source: .Blood Blood  Preliminary Report (16 Apr 2020 01:02):    No growth to date.    Culture - Urine (collected 14 Apr 2020 22:11)  Source: .Urine Clean Catch (Midstream)  Final Report (15 Apr 2020 18:00):    <10,000 CFU/mL Normal Urogenital Radha        RADIOLOGY & ADDITIONAL TESTS:  Results Reviewed:   Imaging Personally Reviewed:  Electrocardiogram Personally Reviewed:    COORDINATION OF CARE:  Care Discussed with Consultants/Other Providers [Y/N]:  Prior or Outpatient Records Reviewed [Y/N]: PROGRESS NOTE:     CONTACT INFO:   Eusebio Pollack (Xiao)   NS: [phone]    Patient is a 86y old  Male who presents with a chief complaint of encephalopathy, hypoxia, respiratory failure (16 Apr 2020 11:58)      SUBJECTIVE / OVERNIGHT EVENTS: no acute event overnight. Per nursing, patient was awake and alert last night. This morning, patient reports feeling cold but otherwise denies pain. Had a BM this morning.     ADDITIONAL REVIEW OF SYSTEMS:    MEDICATIONS  (STANDING):  apixaban 5 milliGRAM(s) Oral every 12 hours  fluconAZOLE IVPB      fluconAZOLE IVPB 200 milliGRAM(s) IV Intermittent every 24 hours  HYDROmorphone  Injectable 0.5 milliGRAM(s) IV Push every 6 hours  metoprolol tartrate 25 milliGRAM(s) Oral two times a day    MEDICATIONS  (PRN):  acetaminophen   Tablet .. 650 milliGRAM(s) Oral every 8 hours PRN Temp greater or equal to 38C (100.4F), Mild Pain (1 - 3), Moderate Pain (4 - 6)  melatonin 5 milliGRAM(s) Oral at bedtime PRN Insomnia      CAPILLARY BLOOD GLUCOSE        I&O's Summary    16 Apr 2020 07:01  -  17 Apr 2020 07:00  --------------------------------------------------------  IN: 220 mL / OUT: 0 mL / NET: 220 mL        PHYSICAL EXAM:  Vital Signs Last 24 Hrs  T(C): 36.6 (17 Apr 2020 06:51), Max: 36.6 (17 Apr 2020 06:51)  T(F): 97.8 (17 Apr 2020 06:51), Max: 97.8 (17 Apr 2020 06:51)  HR: 74 (17 Apr 2020 06:51) (54 - 79)  BP: 129/89 (17 Apr 2020 06:51) (129/89 - 159/83)  BP(mean): --  RR: 20 (17 Apr 2020 06:51) (20 - 22)  SpO2: 100% (17 Apr 2020 06:51) (95% - 100%)    CONSTITUTIONAL: NAD, well-developed  RESPIRATORY: Normal respiratory effort; comfortable on NC  CARDIOVASCULAR: No lower extremity edema  ABDOMEN: Soft, nondistended, Nontender to palpation, no rebound/guarding  MUSCLOSKELETAL: no joint swelling or tenderness to palpation  PSYCH: more awake and alert; affect appropriate    LABS:                        9.9    6.71  )-----------( 381      ( 16 Apr 2020 07:14 )             33.3     04-16    145  |  106  |  54<H>  ----------------------------<  130<H>  4.7   |  29  |  1.41<H>    Ca    9.5      16 Apr 2020 07:14  Phos  4.9     04-16  Mg     2.6     04-16    TPro  8.5<H>  /  Alb  2.8<L>  /  TBili  1.3<H>  /  DBili  x   /  AST  34  /  ALT  18  /  AlkPhos  84  04-16              Culture - Blood (collected 15 Apr 2020 00:20)  Source: .Blood Blood  Preliminary Report (16 Apr 2020 01:02):    No growth to date.    Culture - Blood (collected 15 Apr 2020 00:20)  Source: .Blood Blood  Preliminary Report (16 Apr 2020 01:02):    No growth to date.    Culture - Urine (collected 14 Apr 2020 22:11)  Source: .Urine Clean Catch (Midstream)  Final Report (15 Apr 2020 18:00):    <10,000 CFU/mL Normal Urogenital Radha        RADIOLOGY & ADDITIONAL TESTS:  Results Reviewed:   Imaging Personally Reviewed:  Electrocardiogram Personally Reviewed:    COORDINATION OF CARE:  Care Discussed with Consultants/Other Providers [Y/N]:  Prior or Outpatient Records Reviewed [Y/N]:

## 2020-04-17 NOTE — PROGRESS NOTE ADULT - PROBLEM SELECTOR PLAN 10
- PCP: unkown  - Dispo: back to rehab pending PT vs. per family wishes  -GOC: Per patient's hcp, goal is home hospice. Will assess ability to take PO medicine and try to wean O2 to these ends. Palliative care onboard    [place]in Church, PGY1  [phone] - PCP: issa  - Dispo: [facility] and then home with home hospice  -GOC: Per patient's hcp, goal is home hospice. Pleasure feeding with dysphagia diet with thin liquid and try to wean O2 to these ends. Palliative care onboard

## 2020-04-17 NOTE — PROGRESS NOTE ADULT - ASSESSMENT
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 Palliative onboard for goal of care/dispo planning. Discussed with grand-daughter: patient is DNR/DNI, plan is to [facility] and eventually home with home hospice. Trend labs every 3 days.

## 2020-04-17 NOTE — CHART NOTE - NSCHARTNOTEFT_GEN_A_CORE
Spoke with MYA Ortiz from primary team.  Pt comfortable on current dose of dilaudid.  C/w current dosing.  Palliative care will sign off as symptoms are controlled and goals are identified.

## 2020-04-17 NOTE — PROGRESS NOTE ADULT - PROBLEM SELECTOR PLAN 3
- s/p plaquenil on prior admission  - was desaturating to 80s per chart in rehab  - currently saturating well on 4L NC  - could consider steroids or anakinra (renally dosed) if no further improvement  - c/w eliquis full dose for AC  - will clarify with family regarding goal of care. Will trend CRP, ferritin, d-dimer, LDH, procalcitonin if consistent with goal of care  - if worsening, will get CT chest if consistent with goal of care - s/p plaquenil on prior admission  - was desaturating to 80s per chart in rehab  - currently saturating well on 4L NC  - could consider steroids or anakinra (renally dosed) if no further improvement  - c/w eliquis full dose for AC  - Discussed with family regarding goal of care. Check labs q3days. Trend CRP, ferritin, d-dimer, LDH, procalcitonin q3 days   - if worsening, will get CT chest if consistent with goal of care

## 2020-04-17 NOTE — PROGRESS NOTE ADULT - PROBLEM SELECTOR PLAN 1
Suspect 2/2 hypoxia and tachypnea/respiratory failure in setting of recent COVID on top of history of pulmonary fibrosis  - also with age-indeterminate pontine infarct on CT head; no focal neurological deficit  - consider adding ASA 81 once patient recovers from COVID   - treat COVID as below  - recent TSH, B12, folate, RPR wnl Suspect 2/2 hypoxia and tachypnea/respiratory failure in setting of recent COVID on top of history of pulmonary fibrosis. Improving today  - also with age-indeterminate pontine infarct on CT head; no focal neurological deficit  - consider adding ASA 81 once patient recovers from COVID   - treat COVID as below  - recent TSH, B12, folate, RPR wnl

## 2020-04-17 NOTE — PROGRESS NOTE ADULT - PROBLEM SELECTOR PLAN 8
Concern for oropharyngeal and esophageal candidiasis given dysphonia and scrapable white plaque  -Fluconazole 400 mg PO once followed by 200 mg daily for 13 days Concern for oropharyngeal and esophageal candidiasis given dysphonia and scrapable white plaque  -continue IV Fluconazole for a total of 13 day course

## 2020-04-17 NOTE — PROGRESS NOTE ADULT - ATTENDING COMMENTS
Remains with NC O2 requirement. As per report appears to have improved from work of breathing standpoint.    As per prior discussions, the goal is for comfort and aim for home hospice. As such, will provide low-dose opioids for SOB and cough. decrease medication burden as necessary, which includes stopping of statin. pleasure feeds. DNR/DNI    CPT: 51454  ICD: J12.89, B97.29

## 2020-04-18 RX ORDER — HYDROMORPHONE HYDROCHLORIDE 2 MG/ML
1 INJECTION INTRAMUSCULAR; INTRAVENOUS; SUBCUTANEOUS EVERY 6 HOURS
Refills: 0 | Status: DISCONTINUED | OUTPATIENT
Start: 2020-04-18 | End: 2020-04-20

## 2020-04-18 RX ADMIN — HYDROMORPHONE HYDROCHLORIDE 1 MILLIGRAM(S): 2 INJECTION INTRAMUSCULAR; INTRAVENOUS; SUBCUTANEOUS at 18:43

## 2020-04-18 RX ADMIN — HYDROMORPHONE HYDROCHLORIDE 1 MILLIGRAM(S): 2 INJECTION INTRAMUSCULAR; INTRAVENOUS; SUBCUTANEOUS at 23:49

## 2020-04-18 RX ADMIN — Medication 25 MILLIGRAM(S): at 04:52

## 2020-04-18 RX ADMIN — APIXABAN 5 MILLIGRAM(S): 2.5 TABLET, FILM COATED ORAL at 04:51

## 2020-04-18 RX ADMIN — APIXABAN 5 MILLIGRAM(S): 2.5 TABLET, FILM COATED ORAL at 18:44

## 2020-04-18 RX ADMIN — HYDROMORPHONE HYDROCHLORIDE 0.5 MILLIGRAM(S): 2 INJECTION INTRAMUSCULAR; INTRAVENOUS; SUBCUTANEOUS at 04:52

## 2020-04-18 RX ADMIN — FLUCONAZOLE 100 MILLIGRAM(S): 150 TABLET ORAL at 18:43

## 2020-04-18 RX ADMIN — HYDROMORPHONE HYDROCHLORIDE 0.5 MILLIGRAM(S): 2 INJECTION INTRAMUSCULAR; INTRAVENOUS; SUBCUTANEOUS at 11:35

## 2020-04-18 RX ADMIN — Medication 25 MILLIGRAM(S): at 18:44

## 2020-04-18 NOTE — PROGRESS NOTE ADULT - PROBLEM SELECTOR PLAN 8
Concern for oropharyngeal and esophageal candidiasis given dysphonia and scrapable white plaque  -continue IV Fluconazole for a total of 13 day course (last day 4/27)

## 2020-04-18 NOTE — PROGRESS NOTE ADULT - PROBLEM SELECTOR PLAN 3
- s/p plaquenil on prior admission  - currently saturating well on 3L NC  - could consider steroids or anakinra (renally dosed) if no further improvement  - c/w Eliquis full dose for AC  - Discussed with family regarding goal of care. Check labs q3days. Trend CRP, ferritin, d-dimer, LDH, procalcitonin q3 days   - if worsening, will get CT chest if consistent with goal of care

## 2020-04-18 NOTE — PROGRESS NOTE ADULT - SUBJECTIVE AND OBJECTIVE BOX
[facility] Division of Hospital Medicine Progress Note    Patient is a 86y old  Male who presents with a chief complaint of encephalopathy, hypoxia, respiratory failure (17 Apr 2020 07:19)    SUBJECTIVE / OVERNIGHT EVENTS:  denies any complains   dose of Dilaudid increased to 1mg IV q/ 6 hrs ATC  granddaughter updated on medical status  family allowed visitations    ADDITIONAL REVIEW OF SYSTEMS:    MEDICATIONS  (STANDING):  apixaban 5 milliGRAM(s) Oral every 12 hours  fluconAZOLE IVPB      fluconAZOLE IVPB 200 milliGRAM(s) IV Intermittent every 24 hours  HYDROmorphone  Injectable 1 milliGRAM(s) IV Push every 6 hours  metoprolol tartrate 25 milliGRAM(s) Oral two times a day    MEDICATIONS  (PRN):  acetaminophen   Tablet .. 650 milliGRAM(s) Oral every 8 hours PRN Temp greater or equal to 38C (100.4F), Mild Pain (1 - 3), Moderate Pain (4 - 6)  melatonin 5 milliGRAM(s) Oral at bedtime PRN Insomnia    I&O's Summary    PHYSICAL EXAM:  Vital Signs Last 24 Hrs  T(C): 36.6 (18 Apr 2020 13:30), Max: 36.8 (17 Apr 2020 23:09)  T(F): 97.9 (18 Apr 2020 13:30), Max: 98.2 (17 Apr 2020 23:09)  HR: 103 (18 Apr 2020 13:30) (83 - 103)  BP: 112/68 (18 Apr 2020 13:30) (112/68 - 125/70)  BP(mean): --  RR: 18 (18 Apr 2020 13:30) (18 - 20)  SpO2: 96% (18 Apr 2020 13:30) (96% - 100%)    CONSTITUTIONAL: NAD, well-developed  RESPIRATORY: Normal respiratory effort; comfortable on NC  CARDIOVASCULAR: No lower extremity edema  ABDOMEN: Soft, nondistended, Nontender to palpation, no rebound/guarding  MUSCLOSKELETAL: no joint swelling or tenderness to palpation  PSYCH: more awake and alert; affect appropriate    LABS:    no blood draws today, plan for repeat labs tomorrow     COVID-19 PCR: Detected (03-31-20 @ 11:03)    RADIOLOGY & ADDITIONAL TESTS:  Imaging from Last 24 Hours:  Electrocardiogram/QTc Interval:    COORDINATION OF CARE:  Care Discussed with Consultants/Other Providers:

## 2020-04-18 NOTE — PROGRESS NOTE ADULT - PROBLEM SELECTOR PLAN 4
- on CTH, pontine lacunar infarct of undetermined etiology  - palliative following. Discussed goal of care with family, doesn't want to pursue further w/u for the stroke. Plan is home with hospice vs inpatient hospice   - Currently DNR/DNI

## 2020-04-18 NOTE — PROGRESS NOTE ADULT - PROBLEM SELECTOR PLAN 10
- PCP: unknown  - Dispo: navarrete and then home with home hospice  -GOC: Per patient's hcp, goal is home hospice. Pleasure feeding with dysphagia diet with thin liquid and try to wean O2 to these ends. Palliative care following

## 2020-04-18 NOTE — PROGRESS NOTE ADULT - PROBLEM SELECTOR PLAN 2
Very likely 2/2 COVID  - PE less likely given already on Eliquis  - c/w supplemental O2  - hold steroids for now unless clinical decompensation;  - No lasix given dry mucous membranes

## 2020-04-18 NOTE — PROGRESS NOTE ADULT - PROBLEM SELECTOR PLAN 9
Per HCP, patient is DNR/DNI and goal is to aim for hospice care  -Complains of shortness of breath but no pain despite improved O2 saturation.  -increased to Dilaudid 1 mg IV q6h for air hunger yesterday  -palliative care following Per HCP, patient is DNR/DNI and goal is to aim for hospice care  -Complains of shortness of breath but no pain despite improved O2 saturation.  -increased to Dilaudid 1 mg IV q6h for better pain control / SOB  -palliative care following

## 2020-04-18 NOTE — PROGRESS NOTE ADULT - PROBLEM SELECTOR PLAN 1
Suspect 2/2 hypoxia and tachypnea/respiratory failure in setting of recent COVID on top of history of pulmonary fibrosis. Improving today  - also with age-indeterminate pontine infarct on CT head; no focal neurological deficit  - consider adding ASA 81 once patient recovers from COVID   - treat COVID as below  - recent TSH, B12, folate, RPR wnl

## 2020-04-19 ENCOUNTER — TRANSCRIPTION ENCOUNTER (OUTPATIENT)
Age: 85
End: 2020-04-19

## 2020-04-19 DIAGNOSIS — E87.0 HYPEROSMOLALITY AND HYPERNATREMIA: ICD-10-CM

## 2020-04-19 LAB
ANION GAP SERPL CALC-SCNC: 15 MMOL/L — SIGNIFICANT CHANGE UP (ref 5–17)
BUN SERPL-MCNC: 58 MG/DL — HIGH (ref 7–23)
CALCIUM SERPL-MCNC: 9.7 MG/DL — SIGNIFICANT CHANGE UP (ref 8.4–10.5)
CHLORIDE SERPL-SCNC: 112 MMOL/L — HIGH (ref 96–108)
CO2 SERPL-SCNC: 23 MMOL/L — SIGNIFICANT CHANGE UP (ref 22–31)
CREAT SERPL-MCNC: 1.59 MG/DL — HIGH (ref 0.5–1.3)
CRP SERPL-MCNC: 9.74 MG/DL — HIGH (ref 0–0.4)
D DIMER BLD IA.RAPID-MCNC: 479 NG/ML DDU — HIGH
FERRITIN SERPL-MCNC: 382 NG/ML — SIGNIFICANT CHANGE UP (ref 30–400)
GLUCOSE SERPL-MCNC: 87 MG/DL — SIGNIFICANT CHANGE UP (ref 70–99)
HCT VFR BLD CALC: 35.8 % — LOW (ref 39–50)
HGB BLD-MCNC: 10.1 G/DL — LOW (ref 13–17)
LDH SERPL L TO P-CCNC: 371 U/L — HIGH (ref 50–242)
MCHC RBC-ENTMCNC: 20.5 PG — LOW (ref 27–34)
MCHC RBC-ENTMCNC: 28.2 GM/DL — LOW (ref 32–36)
MCV RBC AUTO: 72.6 FL — LOW (ref 80–100)
NRBC # BLD: 0 /100 WBCS — SIGNIFICANT CHANGE UP (ref 0–0)
PLATELET # BLD AUTO: 253 K/UL — SIGNIFICANT CHANGE UP (ref 150–400)
POTASSIUM SERPL-MCNC: 5.4 MMOL/L — HIGH (ref 3.5–5.3)
POTASSIUM SERPL-SCNC: 5.4 MMOL/L — HIGH (ref 3.5–5.3)
PROCALCITONIN SERPL-MCNC: 0.08 NG/ML — SIGNIFICANT CHANGE UP (ref 0.02–0.1)
RBC # BLD: 4.93 M/UL — SIGNIFICANT CHANGE UP (ref 4.2–5.8)
RBC # FLD: 19 % — HIGH (ref 10.3–14.5)
SODIUM SERPL-SCNC: 150 MMOL/L — HIGH (ref 135–145)
WBC # BLD: 5.64 K/UL — SIGNIFICANT CHANGE UP (ref 3.8–10.5)
WBC # FLD AUTO: 5.64 K/UL — SIGNIFICANT CHANGE UP (ref 3.8–10.5)

## 2020-04-19 RX ORDER — SODIUM CHLORIDE 9 MG/ML
1000 INJECTION, SOLUTION INTRAVENOUS
Refills: 0 | Status: DISCONTINUED | OUTPATIENT
Start: 2020-04-19 | End: 2020-04-20

## 2020-04-19 RX ORDER — FLUCONAZOLE 150 MG/1
200 TABLET ORAL EVERY 24 HOURS
Refills: 0 | Status: DISCONTINUED | OUTPATIENT
Start: 2020-04-19 | End: 2020-04-20

## 2020-04-19 RX ORDER — HYDROMORPHONE HYDROCHLORIDE 2 MG/ML
1 INJECTION INTRAMUSCULAR; INTRAVENOUS; SUBCUTANEOUS EVERY 4 HOURS
Refills: 0 | Status: DISCONTINUED | OUTPATIENT
Start: 2020-04-19 | End: 2020-04-20

## 2020-04-19 RX ADMIN — HYDROMORPHONE HYDROCHLORIDE 1 MILLIGRAM(S): 2 INJECTION INTRAMUSCULAR; INTRAVENOUS; SUBCUTANEOUS at 12:47

## 2020-04-19 RX ADMIN — HYDROMORPHONE HYDROCHLORIDE 1 MILLIGRAM(S): 2 INJECTION INTRAMUSCULAR; INTRAVENOUS; SUBCUTANEOUS at 04:36

## 2020-04-19 RX ADMIN — HYDROMORPHONE HYDROCHLORIDE 1 MILLIGRAM(S): 2 INJECTION INTRAMUSCULAR; INTRAVENOUS; SUBCUTANEOUS at 18:33

## 2020-04-19 RX ADMIN — Medication 25 MILLIGRAM(S): at 04:38

## 2020-04-19 RX ADMIN — SODIUM CHLORIDE 50 MILLILITER(S): 9 INJECTION, SOLUTION INTRAVENOUS at 12:47

## 2020-04-19 RX ADMIN — FLUCONAZOLE 100 MILLIGRAM(S): 150 TABLET ORAL at 16:40

## 2020-04-19 RX ADMIN — HYDROMORPHONE HYDROCHLORIDE 1 MILLIGRAM(S): 2 INJECTION INTRAMUSCULAR; INTRAVENOUS; SUBCUTANEOUS at 22:54

## 2020-04-19 RX ADMIN — APIXABAN 5 MILLIGRAM(S): 2.5 TABLET, FILM COATED ORAL at 17:21

## 2020-04-19 RX ADMIN — Medication 25 MILLIGRAM(S): at 17:22

## 2020-04-19 RX ADMIN — APIXABAN 5 MILLIGRAM(S): 2.5 TABLET, FILM COATED ORAL at 04:38

## 2020-04-19 NOTE — PROGRESS NOTE ADULT - PROBLEM SELECTOR PLAN 3
- Pt had elevated Na level of 150   - Likely 2/2 poor oral intake, appears dry on exam  - Started on low rate D5 % 50 cc/hr   - f/u repeat BMP

## 2020-04-19 NOTE — PROGRESS NOTE ADULT - PROBLEM SELECTOR PLAN 4
- s/p plaquenil on prior admission  - currently saturating well on 3L NC  - c/w Eliquis full dose for AC  - Discussed with family regarding goal of care. Check labs q3days. Trend CRP, ferritin, d-dimer, LDH, procalcitonin q3 days   - Currently all markers trending down

## 2020-04-19 NOTE — DISCHARGE NOTE PROVIDER - NSDCCPCAREPLAN_GEN_ALL_CORE_FT
PRINCIPAL DISCHARGE DIAGNOSIS  Diagnosis: Pneumonia due to COVID-19 virus  Assessment and Plan of Treatment: Pneumonia due to COVID-19 virus  treatment and supportive care  isolation protocol PRINCIPAL DISCHARGE DIAGNOSIS  Diagnosis: Pneumonia due to COVID-19 virus  Assessment and Plan of Treatment: Pneumonia due to COVID-19 virus  treatment and supportive care  isolation protocol      SECONDARY DISCHARGE DIAGNOSES  Diagnosis: Candida albicans infection  Assessment and Plan of Treatment: Candida albicans infection  Concern for oropharyngeal and esophageal candidiasis given dysphonia and scrapable white plaque  was given IV Fluconazole       Diagnosis: Acute respiratory failure, unspecified whether with hypoxia or hypercapnia  Assessment and Plan of Treatment: Acute respiratory failure, unspecified whether with hypoxia or hypercapnia   secondary to  COVID  - PE less likely given already on Eliquis  improved oxygenation      Diagnosis: Metabolic encephalopathy  Assessment and Plan of Treatment: Metabolic encephalopathy  Suspect multifactorial  secondary to hypoxia and tachypnea/respiratory failure in setting of recent COVID on top of history of pulmonary fibrosis  - Age-indeterminate pontine infarct on CT head; no focal neurological deficit  - consider adding ASA 81 once patient recovers from COVID   - - recent TSH, B12, folate, RPR wnl. PRINCIPAL DISCHARGE DIAGNOSIS  Diagnosis: Pneumonia due to COVID-19 virus  Assessment and Plan of Treatment: Pneumonia due to COVID-19 virus  treatment and supportive care/ home hospice  isolation protocol      SECONDARY DISCHARGE DIAGNOSES  Diagnosis: Candida albicans infection  Assessment and Plan of Treatment: Candida albicans infection  Concern for oropharyngeal and esophageal candidiasis given dysphonia and scrapable white plaque  was given IV Fluconazole       Diagnosis: Acute respiratory failure, unspecified whether with hypoxia or hypercapnia  Assessment and Plan of Treatment: Acute respiratory failure, unspecified whether with hypoxia or hypercapnia   secondary to  COVID  - PE less likely given already on Eliquis  improved oxygenation      Diagnosis: Metabolic encephalopathy  Assessment and Plan of Treatment: Metabolic encephalopathy  Suspect multifactorial  secondary to hypoxia and tachypnea/respiratory failure in setting of recent COVID on top of history of pulmonary fibrosis  - Age-indeterminate pontine infarct on CT head; no focal neurological deficit  - consider adding ASA 81 once patient recovers from COVID   - - recent TSH, B12, folate, RPR wnl.

## 2020-04-19 NOTE — PROGRESS NOTE ADULT - PROBLEM SELECTOR PLAN 1
- Suspect multifactorial 2/2 hypoxia and tachypnea/respiratory failure in setting of recent COVID on top of history of pulmonary fibrosis  - Age-indeterminate pontine infarct on CT head; no focal neurological deficit  - consider adding ASA 81 once patient recovers from COVID   - treat COVID as below  - recent TSH, B12, folate, RPR wnl

## 2020-04-19 NOTE — PROGRESS NOTE ADULT - PROBLEM SELECTOR PLAN 2
- 2/2 COVID  - PE less likely given already on Eliquis  - c/w supplemental O2  - hold steroids for now unless clinical decompensation;  - No lasix given dry mucous membranes

## 2020-04-19 NOTE — DISCHARGE NOTE PROVIDER - NSDCMRMEDTOKEN_GEN_ALL_CORE_FT
Eliquis 5 mg oral tablet: 1 tab(s) orally every 12 hours  lisinopril 10 mg oral tablet: 2 tab(s) orally once a day  metoprolol succinate 50 mg oral tablet, extended release: 1 tab(s) orally once a day  simvastatin 80 mg oral tablet: 1 tab(s) orally once a day (at bedtime) acetaminophen 650 mg rectal suppository: 1 suppository(ies) rectal every 6 hours, As needed, Temp greater or equal to 38.5C (101.3F)  bisacodyl 10 mg rectal suppository: 1 suppository(ies) rectal once a day, As needed, Constipation  metoprolol tartrate 25 mg oral tablet: 1 tab(s) orally 2 times a day acetaminophen 650 mg rectal suppository: 1 suppository(ies) rectally every 6 hours  As needed for pain/ Fever   Dulcolax Stool Softener 100 mg oral capsule: 1 cap(s) orally once a day (at bedtime)   metoprolol tartrate 25 mg oral tablet: 1 tab(s) orally 2 times a day acetaminophen 650 mg rectal suppository: 1 suppository(ies) rectally every 6 hours  As needed for pain/ Fever   Dulcolax Stool Softener 100 mg oral capsule: 1 cap(s) orally once a day (at bedtime)   HYDROmorphone 2 mg oral tablet: 1 tab(s) orally every 4 hours, As needed, Pain/ Dyspnea  metoprolol tartrate 25 mg oral tablet: 1 tab(s) orally 2 times a day  metoprolol tartrate 25 mg oral tablet: 1 tab(s) orally 2 times a day

## 2020-04-19 NOTE — PROGRESS NOTE ADULT - SUBJECTIVE AND OBJECTIVE BOX
[facility] Division of Hospital Medicine Progress Note    Patient is a 86y old  Male who presents with a chief complaint of encephalopathy, hypoxia, respiratory failure (17 Apr 2020 07:19)    SUBJECTIVE / OVERNIGHT EVENTS:  denies any complains   family allowed visitations  added Dilaudid PRN for pain     ADDITIONAL REVIEW OF SYSTEMS:    MEDICATIONS  (STANDING):  apixaban 5 milliGRAM(s) Oral every 12 hours  dextrose 5%. 1000 milliLiter(s) (50 mL/Hr) IV Continuous <Continuous>  fluconAZOLE IVPB      fluconAZOLE IVPB 200 milliGRAM(s) IV Intermittent every 24 hours  HYDROmorphone  Injectable 1 milliGRAM(s) IV Push every 6 hours  metoprolol tartrate 25 milliGRAM(s) Oral two times a day    MEDICATIONS  (PRN):  acetaminophen   Tablet .. 650 milliGRAM(s) Oral every 8 hours PRN Temp greater or equal to 38C (100.4F), Mild Pain (1 - 3), Moderate Pain (4 - 6)  HYDROmorphone  Injectable 1 milliGRAM(s) IV Push every 4 hours PRN pain  melatonin 5 milliGRAM(s) Oral at bedtime PRN Insomnia    PHYSICAL EXAM:  Vital Signs Last 24 Hrs  T(C): 36.6 (19 Apr 2020 04:23), Max: 36.6 (18 Apr 2020 13:30)  T(F): 97.8 (19 Apr 2020 04:23), Max: 97.9 (18 Apr 2020 13:30)  HR: 85 (19 Apr 2020 04:23) (46 - 103)  BP: 109/54 (19 Apr 2020 04:23) (109/54 - 122/72)  BP(mean): --  RR: 20 (19 Apr 2020 04:23) (18 - 21)  SpO2: 92% (19 Apr 2020 04:23) (92% - 100%)    CONSTITUTIONAL: NAD, well-developed  RESPIRATORY: Normal respiratory effort; comfortable on NC  CARDIOVASCULAR: No lower extremity edema  ABDOMEN: Soft, nondistended, Nontender to palpation, no rebound/guarding  MUSCLOSKELETAL: no joint swelling or tenderness to palpation  PSYCH: more awake and alert; affect appropriate    LABS:                   10.1   5.64  )-----------( 253      ( 19 Apr 2020 07:30 )             35.8     04-19    150<H>  |  112<H>  |  58<H>  ----------------------------<  87  5.4<H>   |  23  |  1.59<H>    Ca    9.7      19 Apr 2020 07:28      Ferritin, Serum: 382 ng/mL (04.19)    Ferritin, Serum: 492 ng/mL (04.15)    Ferritin, Serum: 764 ng/mL (04.08)    Ferritin, Serum: 795 ng/mL (04.07)    Ferritin, Serum: 882 ng/mL (04.05)      C-Reactive Protein, Serum: 9.74 mg/dL (04.19)    C-Reactive Protein, Serum: 12.43 mg/dL (04.16)    C-Reactive Protein, Serum: 14.67 mg/dL (04.08)    C-Reactive Protein, Serum: 15.64 mg/dL (04.05)      Procalcitonin, Serum: 0.08 (04.19)    Procalcitonin, Serum: 0.10 (04.15)    Procalcitonin, Serum: 0.11 (04.08)    Procalcitonin, Serum: 0.14 (04.05)      Lactate Dehydrogenase, Serum: 371: Mild hemolysis results may be falsely elevated U/L (04.19)    Lactate Dehydrogenase, Serum: 315 U/L (04.15)    Lactate Dehydrogenase, Serum: 346 U/L (04.02)      D-Dimer Assay, Quantitative: 479 (04.19)    D-Dimer Assay, Quantitative: 1049 (04.16)    D-Dimer Assay, Quantitative: 1808 (04.15)    COVID-19 PCR: Detected (03-31-20 @ 11:03)    RADIOLOGY & ADDITIONAL TESTS:  Imaging from Last 24 Hours:  Electrocardiogram/QTc Interval:    COORDINATION OF CARE:  Care Discussed with Consultants/Other Providers:

## 2020-04-19 NOTE — PROGRESS NOTE ADULT - PROBLEM SELECTOR PLAN 10
Per HCP, patient is DNR/DNI and goal is to aim for hospice care  -Complains of shortness of breath but no pain despite improved O2 saturation.  -increased to Dilaudid 1 mg IV q6h for better pain control / SOB  -palliative care following

## 2020-04-19 NOTE — DISCHARGE NOTE PROVIDER - HOSPITAL COURSE
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 Palliative onboard for goal of care/dispo planning. Discussed with grand-daughter: patient is DNR/DNI, plan is to navarrete and eventually home with home hospice. 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 Palliative onboard for goal of care/dispo planning. 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 Discussed with grand-daughter: patient is DNR/DNI,         Encephalopathy acute.  Suspect multifactorial 2/2 hypoxia and tachypnea/respiratory failure in setting of recent COVID on top of history of pulmonary fibrosis    - Age-indeterminate pontine infarct on CT head; no focal neurological deficit    - consider adding ASA 81 once patient recovers from COVID     repeat COVID test on 4/19/20 positive.    - recent TSH, B12, folate, RPR wnl.         Respiratory failure with hypoxia.  secondary to  COVID    - PE less likely given already on Eliquis    titrated oxygen to room air.    Hypernatremia.  Pt had elevated Na  Likely secondary to poor oral intake, had IV fluid.    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 Discussed with grand-daughter: patient is DNR/DNI,         Encephalopathy acute.  Suspect multifactorial 2/2 hypoxia and tachypnea/respiratory failure in setting of recent COVID on top of history of pulmonary fibrosis    - Age-indeterminate pontine infarct on CT head; no focal neurological deficit    - consider adding ASA 81 once patient recovers from COVID     repeat COVID test on 4/19/20 positive.    - recent TSH, B12, folate, RPR wnl.         Respiratory failure with hypoxia.  secondary to  COVID    - PE less likely given already on Eliquis    titrated oxygen to room air.    Hypernatremia.  Pt had elevated Na  Likely secondary to poor oral intake, had IV fluid.    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 Discussed with grand-daughter: patient is DNR/DNI,         Encephalopathy acute.  Suspect multifactorial 2/2 hypoxia and tachypnea/respiratory failure in setting of recent COVID on top of history of pulmonary fibrosis    - Age-indeterminate pontine infarct on CT head; no focal neurological deficit    - consider adding ASA 81 once patient recovers from COVID     repeat COVID test on 4/19/20 positive.    - recent TSH, B12, folate, RPR wnl.         Respiratory failure with hypoxia secondary to  COVID infection    Hypernatremia.  Pt had elevated Na  Likely secondary to poor oral intake, s/p IV fluid.    s/p diflucan for candida infection    evaluation by hospice and for home hospice with home assistance    Patient now medically cleared by attending for discharge to home hospice.

## 2020-04-19 NOTE — PROGRESS NOTE ADULT - PROBLEM SELECTOR PLAN 5
- CTH, pontine lacunar infarct of undetermined etiology  - palliative following, family, doesn't want to pursue further w/u for the stroke. Plan is home with hospice vs inpatient hospice   - Currently DNR/DNI

## 2020-04-20 DIAGNOSIS — Z71.89 OTHER SPECIFIED COUNSELING: ICD-10-CM

## 2020-04-20 LAB
ANION GAP SERPL CALC-SCNC: 10 MMOL/L — SIGNIFICANT CHANGE UP (ref 5–17)
ANION GAP SERPL CALC-SCNC: 11 MMOL/L — SIGNIFICANT CHANGE UP (ref 5–17)
BUN SERPL-MCNC: 51 MG/DL — HIGH (ref 7–23)
BUN SERPL-MCNC: 61 MG/DL — HIGH (ref 7–23)
CALCIUM SERPL-MCNC: 9.5 MG/DL — SIGNIFICANT CHANGE UP (ref 8.4–10.5)
CALCIUM SERPL-MCNC: 9.5 MG/DL — SIGNIFICANT CHANGE UP (ref 8.4–10.5)
CHLORIDE SERPL-SCNC: 115 MMOL/L — HIGH (ref 96–108)
CHLORIDE SERPL-SCNC: 117 MMOL/L — HIGH (ref 96–108)
CO2 SERPL-SCNC: 26 MMOL/L — SIGNIFICANT CHANGE UP (ref 22–31)
CO2 SERPL-SCNC: 28 MMOL/L — SIGNIFICANT CHANGE UP (ref 22–31)
CREAT SERPL-MCNC: 1.48 MG/DL — HIGH (ref 0.5–1.3)
CREAT SERPL-MCNC: 1.57 MG/DL — HIGH (ref 0.5–1.3)
CULTURE RESULTS: SIGNIFICANT CHANGE UP
CULTURE RESULTS: SIGNIFICANT CHANGE UP
GLUCOSE SERPL-MCNC: 101 MG/DL — HIGH (ref 70–99)
GLUCOSE SERPL-MCNC: 142 MG/DL — HIGH (ref 70–99)
POTASSIUM SERPL-MCNC: 4.6 MMOL/L — SIGNIFICANT CHANGE UP (ref 3.5–5.3)
POTASSIUM SERPL-MCNC: 6.4 MMOL/L — CRITICAL HIGH (ref 3.5–5.3)
POTASSIUM SERPL-SCNC: 4.6 MMOL/L — SIGNIFICANT CHANGE UP (ref 3.5–5.3)
POTASSIUM SERPL-SCNC: 6.4 MMOL/L — CRITICAL HIGH (ref 3.5–5.3)
SARS-COV-2 RNA SPEC QL NAA+PROBE: DETECTED
SODIUM SERPL-SCNC: 153 MMOL/L — HIGH (ref 135–145)
SODIUM SERPL-SCNC: 154 MMOL/L — HIGH (ref 135–145)
SPECIMEN SOURCE: SIGNIFICANT CHANGE UP
SPECIMEN SOURCE: SIGNIFICANT CHANGE UP

## 2020-04-20 PROCEDURE — 99233 SBSQ HOSP IP/OBS HIGH 50: CPT

## 2020-04-20 RX ORDER — ACETAMINOPHEN 500 MG
650 TABLET ORAL EVERY 6 HOURS
Refills: 0 | Status: DISCONTINUED | OUTPATIENT
Start: 2020-04-20 | End: 2020-04-21

## 2020-04-20 RX ORDER — SODIUM CHLORIDE 9 MG/ML
1000 INJECTION, SOLUTION INTRAVENOUS
Refills: 0 | Status: DISCONTINUED | OUTPATIENT
Start: 2020-04-20 | End: 2020-04-21

## 2020-04-20 RX ORDER — HYDROMORPHONE HYDROCHLORIDE 2 MG/ML
2 INJECTION INTRAMUSCULAR; INTRAVENOUS; SUBCUTANEOUS EVERY 4 HOURS
Refills: 0 | Status: DISCONTINUED | OUTPATIENT
Start: 2020-04-20 | End: 2020-04-21

## 2020-04-20 RX ORDER — SODIUM CHLORIDE 9 MG/ML
1000 INJECTION, SOLUTION INTRAVENOUS
Refills: 0 | Status: DISCONTINUED | OUTPATIENT
Start: 2020-04-20 | End: 2020-04-20

## 2020-04-20 RX ORDER — MORPHINE SULFATE 50 MG/1
2.5 CAPSULE, EXTENDED RELEASE ORAL EVERY 6 HOURS
Refills: 0 | Status: DISCONTINUED | OUTPATIENT
Start: 2020-04-20 | End: 2020-04-20

## 2020-04-20 RX ORDER — METOPROLOL TARTRATE 50 MG
25 TABLET ORAL
Refills: 0 | Status: DISCONTINUED | OUTPATIENT
Start: 2020-04-20 | End: 2020-04-21

## 2020-04-20 RX ADMIN — HYDROMORPHONE HYDROCHLORIDE 1 MILLIGRAM(S): 2 INJECTION INTRAMUSCULAR; INTRAVENOUS; SUBCUTANEOUS at 06:07

## 2020-04-20 RX ADMIN — SODIUM CHLORIDE 75 MILLILITER(S): 9 INJECTION, SOLUTION INTRAVENOUS at 20:00

## 2020-04-20 RX ADMIN — HYDROMORPHONE HYDROCHLORIDE 1 MILLIGRAM(S): 2 INJECTION INTRAMUSCULAR; INTRAVENOUS; SUBCUTANEOUS at 12:44

## 2020-04-20 RX ADMIN — FLUCONAZOLE 100 MILLIGRAM(S): 150 TABLET ORAL at 16:17

## 2020-04-20 NOTE — PROGRESS NOTE ADULT - PROBLEM SELECTOR PLAN 4
- s/p plaquenil on prior admission  - currently saturating well on 3L NC  - c/w Eliquis full dose for AC  - Discussed with family regarding goal of care. Check labs q3days. Trend CRP, ferritin, d-dimer, LDH, procalcitonin q3 days   - Currently all markers trending down - s/p plaquenil on prior admission  - currently saturating well at 95 % on 3L NC  - Discussed with family regarding goal of care.   -Now comfort care

## 2020-04-20 NOTE — PROGRESS NOTE ADULT - PROBLEM SELECTOR PLAN 9
Concern for oropharyngeal and esophageal candidiasis given dysphonia and scrapable white plaque  -continue IV Fluconazole for a total of 13 day course (last day 4/27) Per HCP, patient is DNR/DNI and goal is to aim for hospice care ? Home Hospice  -Spoke to Family who is all for comfort care.  -palliative care following  -Patient for possible D/C to Home Hospice tomorrow,   -Updated family on clinical status, Grand daughter Rossy [phone],   -Dispo Plan: for Home with Hospice

## 2020-04-20 NOTE — PROGRESS NOTE ADULT - PROBLEM SELECTOR PLAN 3
- Pt had elevated Na level of 150   - Likely 2/2 poor oral intake, appears dry on exam  - Started on low rate D5 % 50 cc/hr   - f/u repeat BMP - Pt had elevated Na level of 150   - Likely 2/2 poor oral intake, appears dry on exam  - C/W D5 1/2 NS @ 75 cc/hr   - BMP

## 2020-04-20 NOTE — PROGRESS NOTE ADULT - PROBLEM SELECTOR PLAN 2
- 2/2 COVID  - PE less likely given already on Eliquis  - c/w supplemental O2  - hold steroids for now unless clinical decompensation;  - No lasix given dry mucous membranes - 2/2 COVID  -D/C  Eliquis /comfort care  - c/w supplemental O2

## 2020-04-20 NOTE — PROGRESS NOTE ADULT - PROBLEM SELECTOR PLAN 5
- CTH, pontine lacunar infarct of undetermined etiology  - palliative following, family, doesn't want to pursue further w/u for the stroke. Plan is home with hospice vs inpatient hospice   - Currently DNR/DNI - CTH, pontine lacunar infarct of undetermined etiology  - palliative following, family, doesn't want to pursue further w/u for the stroke.   - Currently DNR/DNI

## 2020-04-20 NOTE — PROGRESS NOTE ADULT - PROBLEM SELECTOR PLAN 10
Per HCP, patient is DNR/DNI and goal is to aim for hospice care ? Home Hospice  -Complains of shortness of breath but no pain despite improved O2 saturation.  -increased to Dilaudid 1 mg IV q6h for better pain control / SOB  -palliative care following  -Patient for possible D/C to Home Hospice tomorrow, will transition IV Dilaudid to po, Renal function poor, therefore No Morphine  -Updated family on clinical status, Grand daughter Rossy [phone],   -Dispo Plan: for Home with Hospice

## 2020-04-20 NOTE — CHART NOTE - NSCHARTNOTEFT_GEN_A_CORE
follow up- potassium level from AM lab 6.4,  moderate hemolysis;  will repeat test to confirm result per d/w attending MD. follow up- potassium level from AM lab 6.4,  moderate hemolysis; elevated potassium likely secondary to hemolyzed specimen;  no new complaints reported; Reviewed with attending MD ; will repeat test for follow up; pt is DNR/DNI; pending home hospice; updated pt family plan of care.

## 2020-04-20 NOTE — PROGRESS NOTE ADULT - SUBJECTIVE AND OBJECTIVE BOX
[facility] Division of Hospital Medicine Progress Note    Patient is a 86y old  Male who presents with a chief complaint of encephalopathy, hypoxia, respiratory failure (19 Apr 2020 12:43)    SUBJECTIVE / OVERNIGHT EVENTS:  Patient seen and examined at bedside. No acute events overnight.  Remains on Dilaudid for SOB/ Dyspnea/Pain, On NC Oxygen 3L satting at 95%      MEDICATIONS  (STANDING):  apixaban 5 milliGRAM(s) Oral every 12 hours  dextrose 5%. 1000 milliLiter(s) (50 mL/Hr) IV Continuous <Continuous>  fluconAZOLE IVPB 200 milliGRAM(s) IV Intermittent every 24 hours  HYDROmorphone  Injectable 1 milliGRAM(s) IV Push every 6 hours  metoprolol tartrate 25 milliGRAM(s) Oral two times a day    MEDICATIONS  (PRN):  acetaminophen   Tablet .. 650 milliGRAM(s) Oral every 8 hours PRN Temp greater or equal to 38C (100.4F), Mild Pain (1 - 3), Moderate Pain (4 - 6)  HYDROmorphone  Injectable 1 milliGRAM(s) IV Push every 4 hours PRN pain  melatonin 5 milliGRAM(s) Oral at bedtime PRN Insomnia      CAPILLARY BLOOD GLUCOSE  I&O's Summary      PHYSICAL EXAM:  Vital Signs Last 24 Hrs  T(C): 36.4 (20 Apr 2020 06:07), Max: 36.6 (19 Apr 2020 12:35)  T(F): 97.5 (20 Apr 2020 06:07), Max: 97.9 (19 Apr 2020 12:35)  HR: 77 (20 Apr 2020 06:07) (77 - 79)  BP: 126/80 (20 Apr 2020 06:07) (117/61 - 126/80)  BP(mean): --  RR: 18 (20 Apr 2020 06:07) (18 - 18)  SpO2: 95% (20 Apr 2020 06:07) (93% - 95%)    CONSTITUTIONAL: NAD, well-developed  RESPIRATORY: Normal respiratory effort; comfortable on NC  CARDIOVASCULAR: No lower extremity edema  ABDOMEN: Soft, nondistended, Nontender to palpation, no rebound/guarding  MUSCLOSKELETAL: no joint swelling or tenderness to palpation  PSYCH: more awake and alert; affect appropriate    LABS:                        10.1   5.64  )-----------( 253      ( 19 Apr 2020 07:30 )             35.8     04-19    150<H>  |  112<H>  |  58<H>  ----------------------------<  87  5.4<H>   |  23  |  1.59<H>    Ca    9.7      19 Apr 2020 07:28      Ferritin, Serum: 382 ng/mL (04.19)    Ferritin, Serum: 492 ng/mL (04.15)    Ferritin, Serum: 764 ng/mL (04.08)    Ferritin, Serum: 795 ng/mL (04.07)    Ferritin, Serum: 882 ng/mL (04.05)      C-Reactive Protein, Serum: 9.74 mg/dL (04.19)    C-Reactive Protein, Serum: 12.43 mg/dL (04.16)    C-Reactive Protein, Serum: 14.67 mg/dL (04.08)    C-Reactive Protein, Serum: 15.64 mg/dL (04.05)      Procalcitonin, Serum: 0.08 (04.19)    Procalcitonin, Serum: 0.10 (04.15)    Procalcitonin, Serum: 0.11 (04.08)    Procalcitonin, Serum: 0.14 (04.05)      Lactate Dehydrogenase, Serum: 371: Mild hemolysis results may be falsely elevated U/L (04.19)    Lactate Dehydrogenase, Serum: 315 U/L (04.15)    Lactate Dehydrogenase, Serum: 346 U/L (04.02)      D-Dimer Assay, Quantitative: 479 (04.19)    D-Dimer Assay, Quantitative: 1049 (04.16)    D-Dimer Assay, Quantitative: 1808 (04.15)    COVID-19 PCR: Detected (04-19-20 @ 14:32)  COVID-19 PCR: Detected (03-31-20 @ 11:03)      RADIOLOGY & ADDITIONAL TESTS:  Imaging from Last 24 Hours:  Electrocardiogram/QTc Interval:    COORDINATION OF CARE:  Care Discussed with NP and

## 2020-04-21 ENCOUNTER — TRANSCRIPTION ENCOUNTER (OUTPATIENT)
Age: 85
End: 2020-04-21

## 2020-04-21 VITALS
OXYGEN SATURATION: 95 % | DIASTOLIC BLOOD PRESSURE: 73 MMHG | RESPIRATION RATE: 18 BRPM | SYSTOLIC BLOOD PRESSURE: 144 MMHG | TEMPERATURE: 98 F | HEART RATE: 80 BPM

## 2020-04-21 DIAGNOSIS — I27.20 PULMONARY HYPERTENSION, UNSPECIFIED: ICD-10-CM

## 2020-04-21 PROCEDURE — 85379 FIBRIN DEGRADATION QUANT: CPT

## 2020-04-21 PROCEDURE — 82435 ASSAY OF BLOOD CHLORIDE: CPT

## 2020-04-21 PROCEDURE — 87635 SARS-COV-2 COVID-19 AMP PRB: CPT

## 2020-04-21 PROCEDURE — 85730 THROMBOPLASTIN TIME PARTIAL: CPT

## 2020-04-21 PROCEDURE — 83735 ASSAY OF MAGNESIUM: CPT

## 2020-04-21 PROCEDURE — 84484 ASSAY OF TROPONIN QUANT: CPT

## 2020-04-21 PROCEDURE — 82803 BLOOD GASES ANY COMBINATION: CPT

## 2020-04-21 PROCEDURE — 86140 C-REACTIVE PROTEIN: CPT

## 2020-04-21 PROCEDURE — 85014 HEMATOCRIT: CPT

## 2020-04-21 PROCEDURE — 82607 VITAMIN B-12: CPT

## 2020-04-21 PROCEDURE — 84132 ASSAY OF SERUM POTASSIUM: CPT

## 2020-04-21 PROCEDURE — 80053 COMPREHEN METABOLIC PANEL: CPT

## 2020-04-21 PROCEDURE — 85027 COMPLETE CBC AUTOMATED: CPT

## 2020-04-21 PROCEDURE — 82947 ASSAY GLUCOSE BLOOD QUANT: CPT

## 2020-04-21 PROCEDURE — 87086 URINE CULTURE/COLONY COUNT: CPT

## 2020-04-21 PROCEDURE — 84100 ASSAY OF PHOSPHORUS: CPT

## 2020-04-21 PROCEDURE — 82330 ASSAY OF CALCIUM: CPT

## 2020-04-21 PROCEDURE — 82728 ASSAY OF FERRITIN: CPT

## 2020-04-21 PROCEDURE — 82248 BILIRUBIN DIRECT: CPT

## 2020-04-21 PROCEDURE — 85045 AUTOMATED RETICULOCYTE COUNT: CPT

## 2020-04-21 PROCEDURE — 83010 ASSAY OF HAPTOGLOBIN QUANT: CPT

## 2020-04-21 PROCEDURE — 85610 PROTHROMBIN TIME: CPT

## 2020-04-21 PROCEDURE — 80048 BASIC METABOLIC PNL TOTAL CA: CPT

## 2020-04-21 PROCEDURE — 51701 INSERT BLADDER CATHETER: CPT

## 2020-04-21 PROCEDURE — 81001 URINALYSIS AUTO W/SCOPE: CPT

## 2020-04-21 PROCEDURE — 84295 ASSAY OF SERUM SODIUM: CPT

## 2020-04-21 PROCEDURE — 83615 LACTATE (LD) (LDH) ENZYME: CPT

## 2020-04-21 PROCEDURE — 82247 BILIRUBIN TOTAL: CPT

## 2020-04-21 PROCEDURE — 71045 X-RAY EXAM CHEST 1 VIEW: CPT

## 2020-04-21 PROCEDURE — 83605 ASSAY OF LACTIC ACID: CPT

## 2020-04-21 PROCEDURE — 97162 PT EVAL MOD COMPLEX 30 MIN: CPT

## 2020-04-21 PROCEDURE — 99285 EMERGENCY DEPT VISIT HI MDM: CPT | Mod: 25

## 2020-04-21 PROCEDURE — 82746 ASSAY OF FOLIC ACID SERUM: CPT

## 2020-04-21 PROCEDURE — 87040 BLOOD CULTURE FOR BACTERIA: CPT

## 2020-04-21 PROCEDURE — 99238 HOSP IP/OBS DSCHRG MGMT 30/<: CPT

## 2020-04-21 PROCEDURE — 70450 CT HEAD/BRAIN W/O DYE: CPT

## 2020-04-21 PROCEDURE — 84145 PROCALCITONIN (PCT): CPT

## 2020-04-21 PROCEDURE — 86780 TREPONEMA PALLIDUM: CPT

## 2020-04-21 PROCEDURE — 83880 ASSAY OF NATRIURETIC PEPTIDE: CPT

## 2020-04-21 RX ORDER — HYDROMORPHONE HYDROCHLORIDE 2 MG/ML
2 INJECTION INTRAMUSCULAR; INTRAVENOUS; SUBCUTANEOUS ONCE
Refills: 0 | Status: DISCONTINUED | OUTPATIENT
Start: 2020-04-21 | End: 2020-04-21

## 2020-04-21 RX ORDER — HYDROMORPHONE HYDROCHLORIDE 2 MG/ML
1 INJECTION INTRAMUSCULAR; INTRAVENOUS; SUBCUTANEOUS
Qty: 20 | Refills: 0
Start: 2020-04-21 | End: 2020-04-25

## 2020-04-21 RX ORDER — SIMVASTATIN 20 MG/1
1 TABLET, FILM COATED ORAL
Qty: 0 | Refills: 0 | DISCHARGE

## 2020-04-21 RX ORDER — HYDROMORPHONE HYDROCHLORIDE 2 MG/ML
2 INJECTION INTRAMUSCULAR; INTRAVENOUS; SUBCUTANEOUS EVERY 4 HOURS
Refills: 0 | Status: DISCONTINUED | OUTPATIENT
Start: 2020-04-21 | End: 2020-04-21

## 2020-04-21 RX ORDER — METOPROLOL TARTRATE 50 MG
1 TABLET ORAL
Qty: 28 | Refills: 0
Start: 2020-04-21 | End: 2020-05-04

## 2020-04-21 RX ORDER — ACETAMINOPHEN 500 MG
1 TABLET ORAL
Qty: 56 | Refills: 0
Start: 2020-04-21 | End: 2020-05-04

## 2020-04-21 RX ORDER — HYDROMORPHONE HYDROCHLORIDE 2 MG/1
2 TABLET ORAL EVERY 6 HOURS
Qty: 20 | Refills: 0 | Status: ACTIVE | COMMUNITY
Start: 2020-04-21 | End: 1900-01-01

## 2020-04-21 RX ORDER — METOPROLOL TARTRATE 50 MG
1 TABLET ORAL
Qty: 0 | Refills: 0 | DISCHARGE
Start: 2020-04-21

## 2020-04-21 RX ORDER — HYDROMORPHONE HYDROCHLORIDE 2 MG/ML
1 INJECTION INTRAMUSCULAR; INTRAVENOUS; SUBCUTANEOUS
Qty: 0 | Refills: 0 | DISCHARGE
Start: 2020-04-21

## 2020-04-21 RX ORDER — LISINOPRIL 2.5 MG/1
2 TABLET ORAL
Qty: 0 | Refills: 0 | DISCHARGE

## 2020-04-21 RX ORDER — ACETAMINOPHEN 500 MG
1 TABLET ORAL
Qty: 0 | Refills: 0 | DISCHARGE
Start: 2020-04-21

## 2020-04-21 RX ORDER — DOCUSATE SODIUM 100 MG
1 CAPSULE ORAL
Qty: 14 | Refills: 0
Start: 2020-04-21 | End: 2020-05-04

## 2020-04-21 RX ADMIN — Medication 25 MILLIGRAM(S): at 06:14

## 2020-04-21 RX ADMIN — HYDROMORPHONE HYDROCHLORIDE 2 MILLIGRAM(S): 2 INJECTION INTRAMUSCULAR; INTRAVENOUS; SUBCUTANEOUS at 11:18

## 2020-04-21 NOTE — PROGRESS NOTE ADULT - SUBJECTIVE AND OBJECTIVE BOX
[facility] Division of Hospital Medicine Progress Note    Patient is a 86y old  Male who presents with a chief complaint of encephalopathy, hypoxia, respiratory failure (20 Apr 2020 12:04)    SUBJECTIVE / OVERNIGHT EVENTS:  Patient more verbal , awake and alert today, seen and examined at bedside. No acute events overnight. Currently on 4L NC satting at 96 %, On Dilaudid 2 mg for dyspnea/pain , Patient received Dilaudid  x 1 this morning. Spoke to Granddaughter Margiejuan Irwin , ([phone]). Family wants full comfort approach. Going home today with Home hospice.        MEDICATIONS  (STANDING):  dextrose 5% + sodium chloride 0.45%. 1000 milliLiter(s) (75 mL/Hr) IV Continuous <Continuous>  HYDROmorphone   Tablet 2 milliGRAM(s) Oral every 4 hours  metoprolol tartrate 25 milliGRAM(s) Oral two times a day    MEDICATIONS  (PRN):  acetaminophen  Suppository .. 650 milliGRAM(s) Rectal every 6 hours PRN Temp greater or equal to 38.5C (101.3F)  bisacodyl Suppository 10 milliGRAM(s) Rectal daily PRN Constipation  HYDROmorphone   Tablet 2 milliGRAM(s) Oral every 4 hours PRN Pain/ Dyspnea      CAPILLARY BLOOD GLUCOSE    I&O's Summary    20 Apr 2020 07:01  -  21 Apr 2020 07:00  --------------------------------------------------------  IN: 930 mL / OUT: 0 mL / NET: 930 mL      PHYSICAL EXAM:  Vital Signs Last 24 Hrs  T(C): 36.4 (21 Apr 2020 11:17), Max: 36.5 (20 Apr 2020 12:00)  T(F): 97.6 (21 Apr 2020 11:17), Max: 97.7 (20 Apr 2020 12:00)  HR: 50 (21 Apr 2020 11:17) (50 - 137)  BP: 146/81 (21 Apr 2020 11:17) (119/80 - 146/81)  BP(mean): --  RR: 18 (21 Apr 2020 11:17) (18 - 18)  SpO2: 96% (21 Apr 2020 11:17) (92% - 99%)    CONSTITUTIONAL: NAD, More alert and awake today.  RESPIRATORY: Normal respiratory effort; comfortable on NC  CARDIOVASCULAR: No lower extremity edema  ABDOMEN: Soft, nondistended, Nontender to palpation, no rebound/guarding  MUSCLOSKELETAL: no joint swelling or tenderness to palpation  PSYCH: more awake and alert; affect appropriate  LABS:    04-20    153<H>  |  115<H>  |  51<H>  ----------------------------<  142<H>  4.6   |  28  |  1.48<H>    Ca    9.5      20 Apr 2020 17:02    Ferritin, Serum: 382 ng/mL (04.19)   Ferritin, Serum: 492 ng/mL (04.15)   Ferritin, Serum: 764 ng/mL (04.08)   Ferritin, Serum: 795 ng/mL (04.07)   Ferritin, Serum: 882 ng/mL (04.05)     C-Reactive Protein, Serum: 9.74 mg/dL (04.19)   C-Reactive Protein, Serum: 12.43 mg/dL (04.16)   C-Reactive Protein, Serum: 14.67 mg/dL (04.08)   C-Reactive Protein, Serum: 15.64 mg/dL (04.05)      Procalcitonin, Serum: 0.08 (04.19)    Procalcitonin, Serum: 0.10 (04.15)    Procalcitonin, Serum: 0.11 (04.08)    Procalcitonin, Serum: 0.14 (04.05)      Lactate Dehydrogenase, Serum: 371: Mild hemolysis results may be falsely elevated U/L (04.19)    Lactate Dehydrogenase, Serum: 315 U/L (04.15)    Lactate Dehydrogenase, Serum: 346 U/L (04.02)      D-Dimer Assay, Quantitative: 479 (04.19)    D-Dimer Assay, Quantitative: 1049 (04.16)   D-Dimer Assay, Quantitative: 1808 (04.15)      COVID-19 PCR: Detected (04-19-20 @ 14:32)  COVID-19 PCR: Detected (03-31-20 @ 11:03)      RADIOLOGY & ADDITIONAL TESTS:  Imaging from Last 24 Hours:  Electrocardiogram/QTc Interval:    COORDINATION OF CARE:  Care Discussed with  and NP

## 2020-04-21 NOTE — PROGRESS NOTE ADULT - PROBLEM SELECTOR PLAN 4
- s/p plaquenil on prior admission  - currently saturating well at 95 % on 3L NC  - Discussed with family regarding goal of care.   -Family wants full comfort care

## 2020-04-21 NOTE — PROGRESS NOTE ADULT - PROBLEM SELECTOR PLAN 9
Per HCP, patient is DNR/DNI and goal is to aim for hospice care. Home Hospice  -Spoke to Family , all for comfort care.  -Palliative reccs appreciated  -Patient for D/C to Home Hospice today with Home oxygen  -Updated family on clinical status, Grand daughter Rossy [phone],   -Dispo Plan: for Home with Hospice today.

## 2020-04-21 NOTE — PROGRESS NOTE ADULT - REASON FOR ADMISSION
encephalopathy, hypoxia, respiratory failure

## 2020-04-21 NOTE — PROGRESS NOTE ADULT - PROBLEM SELECTOR PLAN 2
- 2/2 COVID  -D/C  Eliquis /comfort care  -Currently on 4L NC satting at 96 %,  - c/w supplemental O2  -Going home with Home Oxygen

## 2020-04-21 NOTE — DISCHARGE NOTE NURSING/CASE MANAGEMENT/SOCIAL WORK - PATIENT PORTAL LINK FT
You can access the FollowMyHealth Patient Portal offered by [facility] by registering at the following website: [email or web address]. By joining [employer]’s FollowMyHealth portal, you will also be able to view your health information using other applications (apps) compatible with our system.

## 2020-04-21 NOTE — PROGRESS NOTE ADULT - PROBLEM SELECTOR PLAN 5
- CTH, pontine lacunar infarct of undetermined etiology  - palliative following, family, doesn't want to pursue further w/u for the stroke.   - Currently DNR/DNI  -Full Comfort care

## 2020-04-21 NOTE — DISCHARGE NOTE NURSING/CASE MANAGEMENT/SOCIAL WORK - NSDCPEPTSTRK_GEN_ALL_CORE
Stroke support groups for patients, families, and friends/Prescribed medications/Call 911 for stroke/Need for follow up after discharge/Stroke warning signs and symptoms/Signs and symptoms of stroke/Risk factors for stroke/Stroke education booklet

## 2020-04-21 NOTE — PROGRESS NOTE ADULT - ASSESSMENT
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 Palliative onboard for goal of care/dispo planning. Discussed with grand-daughter: patient is DNR/DNI, plan is to navarrete and eventually home with home hospice.     Patient discharged to home today with Home Hospice and Home Oxygen.

## 2020-04-21 NOTE — PROGRESS NOTE ADULT - PROBLEM SELECTOR PLAN 3
- Now Na level of 153   - Likely 2/2 poor oral intake, appears dry on exam  - C/W D5 1/2 NS @ 75 cc/hr   - Monitor BMP

## 2020-04-21 NOTE — PROGRESS NOTE ADULT - PROBLEM SELECTOR PLAN 1
- Suspect multifactorial 2/2 hypoxia and tachypnea/respiratory failure in setting of recent COVID on top of history of pulmonary fibrosis  - Age-indeterminate pontine infarct on CT head; no focal neurological deficit  - treat COVID as below  - recent TSH, B12, folate, RPR wnl

## 2020-10-07 NOTE — DISCHARGE NOTE ADULT - NS AS DC FU INST LIST INST
no Azithromycin Pregnancy And Lactation Text: This medication is considered safe during pregnancy and is also secreted in breast milk.

## 2020-11-10 NOTE — PATIENT PROFILE ADULT. - EXTENSIONS OF SELF_ADULT
Name: Sarah Fontana   : 1942   DOS: 11/10/2020     Pain Clinic Follow Up Visit:   Sofiya MARGRET Chan is a 66year old female who presents for recheck of bilateral wrist and forearm numbness, right side worse than left.   She sometimes note nightly. 2   • Aspirin 81 MG Oral Tab Take 81 mg by mouth daily. • Diphenhydramine-APAP, sleep, (TYLENOL PM EXTRA STRENGTH)  MG Oral Tab Take 1 tablet by mouth nightly as needed.              EXAM:   /70 (BP Location: Right arm, Patient P symptoms worsen or fail to improve.     Gregory Barbour, APRN, 11/10/2020, 10:48 AM [employer]eg[employer]

## 2021-06-06 NOTE — ED PROVIDER NOTE - TEMPLATE, MLM
Raven Smith MD;  Location: [facility];  Service: Gynecology     AR Jori Goodwin; VELMA  [facility] Foot [facility] Ankle Surgery/Podiatry      Respiratory

## 2021-08-14 NOTE — PATIENT PROFILE ADULT. - NSTOBACCOWITHDRW_GEN_A_CORE_SD
Pt arrives from home with CO shortness of breath that started to get worse one day ago. Pt states he wears 2-3 L at home as needed and today it was not helping. Pt was given 1 duoneb and 16 mg Dexamethasone by EMS without relief. Pt states feels like COPD flare up.    none

## 2021-10-13 NOTE — ED PROVIDER NOTE - ATTENDING CONTRIBUTION TO CARE
Claire Hernandez)  Obstetrics and Gynecology  [address] [place] [address]  Phone: [phone]  Fax: [phone]  Follow Up Time:    see full note

## 2021-12-02 NOTE — PROGRESS NOTE ADULT - PROBLEM SELECTOR PROBLEM 8
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  He has a history left lower extremity melanoma lateral to the knee which was excised in 2018. [place] lymph node biopsy at that time was negative for metastatic disease.  His skin cancer history is also notable for multiple basal cell carcinomas as well as squamous cell carcinoma and solar keratoses.    In September of 2020, he noted a left groin mass and was ultimately referred to Dr. Stahl.  Surgical removal was recommended and results demonstrated melanoma, as noted below.    He notes some rash associated with keytruda that is pruritic but improved overall.  No other stated side effects with Keytruda.    He has no unexplained pains, abdominal discomfort, blood in the stools or urine, unintentional weight loss, neurologic symptoms or headaches.      He has some improvement in his nasal discharge after starting amoxicillin for possible sinusitis.    He gets LE edema if he doesn't wear compression stockings. He feels well and notes no unusual pains, headaches, night sweats or new skin lesions and remains quite physically active.    ONCOLOGIC HISTORY:  1.        4/26/18: Shave biopsy of the left lateral lower knee for an amelanotic melanoma revealed nevoid malignant melanoma with some spitzoid features with a thickness of at least 1.3 mm extending broadly to the base of the shaved specimen. Anatomic level at least 4. Ulceration indeterminate. Peripheral margins negative.  Deep margin positive. Mitotic rate 4/mm².  Perineural invasion, lymphovascular invasion and tumor infiltrating lymphocytes not identified. At least pT2a  2.        5/21/18: Left knee wide local excision by Dr. Torres with no residual melanoma identified. 2 sentinel lymph  nodes negative for metastatic melanoma. pT2a pN0 (sn).  Stage Ib.  3.  09/30/2020: Lymph node, left groin, excision:  -Metastatic melanoma, 1.5 cm; with extranodal extension.  4.  10/21/2020:  PET-CT scan with A modest-sized, mildly hypermetabolic focus is apparent projecting  within the left inguinal area at a site of somewhat nodular strandy change involving the subcutaneous soft tissues. This is believed to correspond to the site of the patient's recent left inguinal lymph node biopsy. It likely is at least partially on a postsurgical basis. Given the extranodal extension noted on pathology, an element of residual disease in the region is not excluded. No additional findings suspicious for hypermetabolic potential metastatic disease are appreciated.  5.  10/27/2020:  Brain MRI negative for CNS metastases  6.  11/16/2020:  A.   Left femoral canal tissue:  -Benign fibroadipose tissue.     B.   Left groin lymph node tissue:  -4 lymph nodes no evidence of malignancy.  -Skin and subcutaneous tissue revealing dermal scar and foreign body giant cell reaction.  7. 12/3/20: Adjuvant keytruda     MEDICATIONS AND ALLERGIES:    Current Outpatient Medications   Medication Sig Dispense Refill   • amoxicillin-clavulanate (Augmentin) 875-125 MG per tablet Take 1 tablet by mouth every 12 hours. Take with food. 20 tablet 0   • albuterol 108 (90 Base) MCG/ACT inhaler Inhale 2 puffs into the lungs every 4 hours as needed for Shortness of Breath or Wheezing. 1 each 3   • fluticasone (FLONASE) 50 MCG/ACT nasal spray Spray 1 spray in each nostril daily. 16 g 12   • allopurinol (ZYLOPRIM) 100 MG tablet TAKE 1 TABLET BY MOUTH DAILY. TO PREVENT GOUT 90 tablet 1   • ipratropium (ATROVENT) 0.03 % nasal spray Spray 2 sprays in each nostril every 12 hours. 30 mL 1   • triamcinolone (ARISTOCORT) 0.1 % cream Apply topically 2 times daily. Trunk & extremity rash areas 454 g 11   • ALPRAZolam (XANAX) 0.25 MG tablet Take 1 tablet by mouth as  needed for Sleep. As directed PRN for flying. 30 tablet 2   • VITAMIN D, CHOLECALCIFEROL, PO Take 1,000 Units by mouth daily.      • ibuprofen (MOTRIN) 200 MG tablet Take 200 mg by mouth every 6 hours as needed.     • acetaminophen (TYLENOL) 325 MG tablet Take 650 mg by mouth every 4 hours as needed.     • calcium carbonate (TUMS) 500 MG chewable tablet Chew 1 tablet by mouth as needed.     • zolpidem (AMBIEN) 10 MG tablet Take 1 tablet by mouth nightly as needed for Sleep (sleep). 30 tablet 1     No current facility-administered medications for this visit.     ALLERGIES:   Allergen Reactions   • Adhesive   (Environmental) Other (See Comments)     contact derm from bandaids on leg(s);paper tape ok       PROBLEM LIST:  Patient Active Problem List   Diagnosis   • CANCERS OF SKIN     comments   • CANCER OF SKIN    COMMENTS   • Open wound of face, unspecified site, without mention of complication   • CHONDROMALACIA PATELLAE pt   • Cavus deformity of foot, acquired   • Family History of GI malignancy   • Personal history of colon polyps   • Podagra   • Dupuytren's contracture of left hand   • Unspecified malignant neoplasm of skin of trunk, except scrotum   • Wound, open, neck   • Hx of gout   • 'light-for-dates' infant with signs of fetal malnutrition   • Malignant melanoma of left lower extremity including hip (CMS/HCC)   • Panic attacks   • 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  Past Medical History:   Diagnosis Date   • Cancer of skin 06/08/2020    Squam Cell   Right lateral neck / Mohs Dr. Shell / Repair Dr. Uribe   • Cancer of skin 05/25/2021 05/25/21   Basal cell    Right lateral neck / Mohs & repair Dr. Shell   • Hx of gout 3/17/2016    Very likely   • Malignant melanoma of left lower extremity including hip (CMS/HCC) 5/17/2018   • Mitral valve disorders(424.0)     Mitral  vlave prolapse   • Other malignant neoplasm of skin, site unspecified     Multiple basal cell cancers   • Panic attacks 5/22/2019   • Podagra 2014    ? gout     Past Surgical History:   Procedure Laterality Date   • Adj tiss xfer head,fac,hand <10sqcm  3/27/2007    Right cheek BCC   • Biopsy/removal, lymph node(s) Left 09/30/2020    L groin SLNB - Dr. Esteban Stahl   • Chemosurg mohs 1st stage  6/7/05 3/07 4/10 4/13, 6/15    Dr. Gitter Mohs, Basal Cell CA   • Colonoscopy diagnostic     • Colonoscopy remove lesions by snare  4/13/2005    Fragments of an adenomatous polyp, diverticulosis, repeat in 3 years (Dr. Gómez)   • Colonoscopy remove lesions by snare  12/04/2018    Polyps.FamHX.Diverticulosis. Dr. Gómez   • Colonoscopy w biopsy  07/31/13    colon in 5 yrs,adenoma x2,Diverticulosis,fam hx,Dr. Gómez   • Destr malig trunk,extrem 1.1-2 cm  1/07, 2/08, 4/10, 10/13    Dr Shell Curekyliee, Basal Cell CA; trunk, arm, leg   • Exc skin benig 1.1-2cm trunk,arm,leg Left 09/30/2020    excision of L groin/thigh lesion - Dr. Esteban Stahl   • Malignant skin lesion excision  2/13/2015    wide re-excision basal squamout skin cancer/ Trapezial advancement for neck defect closure- Dr Torres   • Mohs 1 stage upto5 tissue blocks [facility] Right 06/08/2020    Squam Cell   Right lateral neck / Mohs Dr. Shell / Repair Dr. Uribe   • Mohs 1 stage upto5 tissue blocks [facility] Right 05/25/2021 05/25/21   Basal cell    Right lateral neck / Mohs & repair Dr. Shlel   • Mohs 1 stage upto5 tissue blocks [facility] Right 05/25/2021 05/25/21   Basal cell    Right lateral neck / Mohs & repair Dr. Shell   • Muscle-skin graft, leg Left 11/16/2020    Dr. Esteban Stahl   • Remove groin lymph nodes superf Left 11/16/2020    L groing lymph node dissection w/ transposition L sartorius muscle flap - Dr. Esteban Stahl    • Remove tonsils/adenoids,<11 y/o     • Service to gastroenterology     • Tonsillectomy and adenoidectomy      • Wound closure  2014    mohs closure neck and upper back:  Split thickness skin graft to the neck, Closure of deep neck wound by advancement of the trapezius muscle,- Dr Torres     Family History   Problem Relation Age of Onset   • Hypertension Mother         alive at 101   • Arthritis Mother         hip replacements   • Neurological Disorder Mother         TIA at 100   • Heart Father          at 63   • Stroke Father    • Cancer, Breast Sister    • Cancer, Skin Sister         basal cells   • Cancer, Lung Brother            • Other Sister          at birth   • Cancer Brother          of colon cancer at 56.   Diagnosed at 53   • Genitourinary Son    • Genitourinary Daughter      Social History     Tobacco Use   • Smoking status: Never Smoker   • Smokeless tobacco: Never Used   Vaping Use   • Vaping Use: never used   Substance Use Topics   • Alcohol use: Yes     Alcohol/week: 7.0 standard drinks     Types: 7 Standard drinks or equivalent per week     Comment: 1 drink daily, no history of abuse   • Drug use: No       REVIEW OF SYSTEMS:    All other systems are reviewed and are negative except as documented in the history of present illness.    PHYSICAL EXAM:  Vital Signs:   Oncology Encounter Vitals [21 0917]   ONC OP Encounter Vitals Group      /80      Heart Rate (!) 54      Resp       Temp 97.7 °F (36.5 °C)      Temp src Oral      SpO2 99 %      Weight 145 lb 3.2 oz (65.9 kg)      Height       Pain Score  0      Pain Location       Pain Education?       BSA (Calculated - m2) - [employer]       BMI (Calculated)        QOPI Data:     Oral Chemo: NA    Psychiatric ROS: Negative for change in affect, anxiety, depression, insomnia, mentation or sleep disturbance.    ECOG Performance Status:   ECOG [21 0920]   ECOG Performance Status 0     General: The patient is alert, well-developed, well-nourished, no distress.  Skin: Warm, normal color, normal texture, normal turgor  and without rash.  Head: Normocephalic, atraumatic.  Eyes: Normal conjunctivae and sclerae. Pupils equal, round.  Cardiovascular: Regular rate and rhythm, no murmurs, gallops or rubs.  Respiratory: Normal respiratory effort. Clear to auscultation. No wheezes, rales, or rhonchi.  Abdomen: Abdomen is soft and non-tender with active bowel sounds. There is no detected hepatosplenomegaly, masses, or ascites.  Extremities: No clubbing, no cyanosis, no edema and normal muscle tone and development bilaterally.  Lymph: There is continued firm, mobile left sided low lying cervical verses supraclavicular adenopathy, generally stable. No significant inguinal lymphadenopathy.   Neurologic: Motor strength normal, coordination normal, no tremor noted.  Psychiatric: Cooperative. Appropriate mood and affect. Normal judgment.    LABORATORY DATA:  Recent Labs   Lab 12/02/21  0854   WBC 4.9   RBC 4.85   HGB 14.1   HCT 42.5   MCV 87.6      Absolute Neutrophils 2.7   Absolute Lymphocytes 1.5   Absolute Monocytes 0.3   Absolute Eosinophils  0.3   Absolute Basophils 0.1     Recent Labs   Lab 12/02/21  0854   Glucose 61*   Sodium 146*   Potassium 3.6   Chloride 107   Carbon Dioxide 32   BUN 15   Creatinine 1.09   Calcium 8.5   Protein, Total 6.6   Albumin 3.6   GOT/AST 25   Alkaline Phosphatase 79   GPT 36     Recent Labs   Lab 12/02/21  0854 10/14/21  0905 09/23/21  1004   Anion Gap 11   < > 13   Globulin 3.0   < > 3.1   LD, Total  --   --  199   Bilirubin, Total 0.5   < > 0.7    < > = values in this interval not displayed.       IMAGING STUDIES:  Imaging studies reviewed. 11/19/21: PET-CT, IMPRESSION:      1.  No definite evidence for recurrent or metastatic disease. New prominent  but nonenlarged peritracheal lymph node with uptake similar to blood pool  is nonspecific.     2.  New mucosal thickening in the ethmoid and left maxillary sinuses with  mild uptake suspected sinusitis. Clinical correlation is  recommended.    ASSESSMENT:  1.  Melanoma:  He is currently doing well and will proceed with his last cycle of immunotherapy (Keytruda) to increase the potential for a prolonged progression-free survival as well as increase overall survival/potentially cure in the setting of akosua recurrence.         I recommended continued exercise and a focus on a healthy diet to optimize the immune system.   We discussed his PET-CT scan and there is no clear evidence of metastatic disease.  None the less, we discussed that I am concerned about his left cervical/supraclavicular adenopathy and recommend an ultrasound to further evaluate those nodes and determine whether their architecture is concerning.  If so, biopsy would ensue.    The patient indicated understanding of the diagnosis and agreed with the plan of care.        A copy of this note was sent to the requesting provider.   CKD (chronic kidney disease)

## 2021-12-06 NOTE — PATIENT PROFILE ADULT - NSPROEDAABILITYLEARN_GEN_A_NUR
Subjective:      Patient ID: Daniel Tapia is a 50 y.o. female. HPI  Chief Complaint   Patient presents with    Sinus infection  History of Present Illness:Kenroy is a(n) 50 y.o. female who presents with a two week follow up from prescription for sinus infection. Prednisone and Augmentin. Here for evaluation and further recommendations.    Nasal Discharge: none  Nasal Obstruction: Yes right; intermittent  Post Nasal Drainage: Yes  Nasal Bleeding: No  Sense of Smell: decreased  Eye Symptoms: none  Previous Treatments: As above     Patient Active Problem List   Diagnosis    Hypothyroidism    Chronic sinusitis    Asthma in adult    Chronic rhinosinusitis    Iron deficiency anemia    Eczema    Intestinal malabsorption    Morbid obesity with BMI of 40.0-44.9, adult (HCC)    Thrombocytopenia (HCC)    Chronic ITP (idiopathic thrombocytopenia) (HCC)    Hyperlipidemia, mixed    Autoimmune hepatitis ([address]    Anxiety    Chronic pansinusitis    Atrial fibrillation with RVR ([facility] [address]    GERD without esophagitis     Past Surgical History:   Procedure Laterality Date    COLONOSCOPY N/A 12/29/2020    COLONOSCOPY DIAGNOSTIC performed by Yeimy Dee MD at [address] HISTORY  8/22/2012    INCISION AND DRAINAGE POSTERIOR THIGH ABSCESS    RECTAL SURGERY  Aug 2011    repair of rectal fissures and anal skin tag removal    SINUS SURGERY      X 3    TONSILLECTOMY  2004    TONSILLECTOMY AND ADENOIDECTOMY  2005    (partial adenoidectomy)    UPPER GASTROINTESTINAL ENDOSCOPY N/A 12/29/2020    EGD BIOPSY performed by [facility]imy Dee MD at [facility]     Family History   Problem Relation Age of Onset    High Blood Pressure Mother     Heart Disease Father     Diabetes Maternal Aunt      Social History     Socioeconomic History    Marital status: Single     Spouse name: Not on file    Number of children: Not on file    Years of education: Not on file    Highest education level: Not on file   Occupational History    Occupation: NA   Tobacco Use    Smoking status: Never Smoker    Smokeless tobacco: Never Used   Vaping Use    Vaping Use: Never used   Substance and Sexual Activity    Alcohol use: Yes     Alcohol/week: 0.0 standard drinks     Comment: 2 drinks/week     Drug use: No    Sexual activity: Yes   Other Topics Concern    Not on file   Social History Narrative    Not on file     Social Determinants of Health     Financial Resource Strain:     Difficulty of Paying Living Expenses: Not on file   Food Insecurity:     Worried About Running Out of Food in the Last Year: Not on file    [place] of Food in the Last Year: Not on file   Transportation Needs:     Lack of Transportation (Medical): Not on file    Lack of Transportation (Non-Medical): Not on file   Physical Activity:     Days of Exercise per Week: Not on file    Minutes of Exercise per Session: Not on file   Stress:     Feeling of Stress : Not on file   Social Connections:     Frequency of Communication with Friends and Family: Not on file    Frequency of Social Gatherings with Friends and Family: Not on file    Attends Christianity Services: Not on file    Active Member of [address] [place] [address] or Organizations: Not on file    Attends Club or Organization Meetings: Not on file    Marital Status: Not on file   Intimate Partner Violence:     Fear of Current or Ex-Partner: Not on file    Emotionally Abused: Not on file    Physically Abused: Not on file    Sexually Abused: Not on file   Housing Stability:     Unable to Pay for Housing in the Last Year: Not on file    Number of [place] in the Last Year: Not on file    Unstable Housing in the Last Year: Not on file       DRUG/FOOD ALLERGIES: Latex, Clindamycin/lincomycin, Sulfa antibiotics, Diflucan [fluconazole], and Other    CURRENT MEDICATIONS  Prior to Admission medications    Medication Sig Start Date End Date Taking?  Authorizing Provider   predniSONE (CARMOL) 10 % cream Apply topically as needed. 8/1/21  Yes Silvio Marvin MD   levothyroxine (SYNTHROID) 137 MCG tablet Take 1 tablet by mouth daily 7/26/21  Yes Kenneth Saenz MD   liothyronine (CYTOMEL) 5 MCG tablet TAKE TWO TABLETS BY MOUTH DAILY 7/26/21  Yes Kenneth Saenz MD   fluticasone (FLONASE) 50 MCG/ACT nasal spray APPLY 1 SPRAY IN THE AFFECTED NOSTRIL ONE TIME A DAY 6/23/21  Yes Rosanne Gibson MD   folic acid (FOLVITE) 1 MG tablet Take 1 tablet by mouth daily 3/26/21  Yes Silvio Marvin MD   Insulin Syringe-Needle U-100 [employer] [place] INSULIN SYRINGE) 31G X 5/16\" 1 ML MISC AS DIRECTED 3/26/21  Yes Silvio Marvin MD   chlorhexidine (BETASEPT SURGICAL SCRUB) 4 % external liquid APPLY TO AFFECTED AREA(S) TOPICALLY AS NEEDED 1/7/21  Yes Brandi Rubio MD   pantoprazole (PROTONIX) 40 MG tablet Take 1 tablet by mouth 2 times daily (before meals) 12/29/20  Yes Ministerio Buck MD   mometasone (ELOCON) 0.1 % cream Apply topically daily. 6/25/20  Yes Brandi Rubio MD   Multiple Vitamins-Minerals (THERAPEUTIC MULTIVITAMIN-MINERALS) tablet Take 1 tablet by mouth daily 6/25/20  Yes Brandi Rubio MD   Misc. Devices (HIBICLENS HAND PUMP 32OZ) MISC Use as needed for skin irritation or bumps.  6/22/20  Yes Will Mazariegos MD   Misc Natural Product Nasal (PONARIS) SOLN 1 spray by Each Nostril route every 6 hours as needed (as need) 6/25/20 7/25/20  Brandi Rubio MD         Lab Studies:  Lab Results   Component Value Date    WBC 5.9 12/07/2020    HGB 12.7 12/07/2020    HCT 38.0 12/07/2020    MCV 90.8 12/07/2020     (L) 12/07/2020     Lab Results   Component Value Date    GLUCOSE 107 (H) 12/07/2020    BUN 12 12/07/2020    CREATININE 0.5 (L) 12/07/2020    K 4.0 12/07/2020     12/07/2020     12/07/2020    CALCIUM 9.3 12/07/2020     Lab Results   Component Value Date    MG 2.00 02/06/2019     Lab Results   Component Value Date    PHOS 3.2 02/09/2017     Lab Results Component Value Date    ALKPHOS 278 (H) 12/08/2020    ALT 78 (H) 12/08/2020    AST 55 (H) 12/08/2020    BILITOT 0.4 12/08/2020    PROT 8.4 (H) 12/08/2020     Review of Systems   Constitutional: Negative for activity change, appetite change, chills, fatigue and fever. HENT: Negative for congestion, ear discharge, ear pain, facial swelling, hearing loss, nosebleeds, postnasal drip, rhinorrhea, sinus pressure, sinus pain, sneezing, sore throat, tinnitus, trouble swallowing and voice change. Eyes: Negative for pain, redness, itching and visual disturbance. Respiratory: Negative for cough, choking and shortness of breath. Gastrointestinal: Negative for diarrhea and nausea. Endocrine: Negative for cold intolerance and heat intolerance. Objective:   Physical Exam  Constitutional:       Appearance: She is well-developed. HENT:      Head: Not macrocephalic and not microcephalic. No Mejia's sign, abrasion, right periorbital erythema, left periorbital erythema or laceration. Nose: No nasal deformity, septal deviation, laceration, mucosal edema or rhinorrhea. Right Nostril: No epistaxis or occlusion. Left Nostril: No epistaxis or occlusion. Right Turbinates: Not enlarged, swollen or pale. Left Turbinates: Not enlarged, swollen or pale. Right Sinus: No maxillary sinus tenderness or frontal sinus tenderness. Left Sinus: No maxillary sinus tenderness or frontal sinus tenderness. Mouth/Throat:      Lips: No lesions. Mouth: Mucous membranes are moist.      Tongue: No lesions. Palate: No mass. Pharynx: Uvula midline. No oropharyngeal exudate or posterior oropharyngeal erythema. Tonsils: No tonsillar abscesses. Eyes:      General:         Right eye: No discharge. Left eye: No discharge. Extraocular Movements:      Right eye: Normal extraocular motion. Left eye: Normal extraocular motion.       Conjunctiva/sclera:      Right eye: Right There were no complications. Pertinent positives included: There was not edema and purulence in the left middle meatus. There was edema and purulence at the right middle meatus. Polyps were not identified in the sinuses. Masses were not identified. Tolerated well without complication. I attest that I was present for and did the entire procedure myself. Assessment:       Diagnosis Orders   1. Chronic pansinusitis  amoxicillin-clavulanate (AUGMENTIN) 875-125 MG per tablet    predniSONE (DELTASONE) 10 MG tablet   2. Acute recurrent sinusitis, unspecified location  amoxicillin-clavulanate (AUGMENTIN) 875-125 MG per tablet    predniSONE (DELTASONE) 10 MG tablet           Plan:      Extend antibiotics and steroids. The patient was counseled for sinusitis and received a Augmentin and prednisone prescription medication(s) for this diagnosis. The mechanism of action for the prescription(s) were explained/reviewed. The appropriate usage was described and technique for topical use explained if appropriate. Questions from the patient were answered and the prescription(s) were e-prescribed to the appropriate pharmacy.           Silvano Stoner MD none

## 2022-03-30 NOTE — PATIENT PROFILE ADULT. - MEDICATIONS BROUGHT TO HOSPITAL, PROFILE
Department of Anesthesiology  Postprocedure Note    Patient: Georgiana Wu  MRN: [id number]  [place]: 1943  Date of evaluation: 3/30/2022  Time:  9:14 AM     Procedure Summary     Date: 03/30/22 Room / Location: [facility] OR 06 / [facility]    Anesthesia Start: 6498 Anesthesia Stop: 0461    Procedure: L5-S1 POSTERIOR LUMBAR INTERBODY FUSION - O ARM, C ARM, JAYDEN TABLE, CELL SAVER, PLATELET GELL, AUDIOLOGY, CAGES, PLATES, GLOBUS (N/A Back) Diagnosis: (LUMBAR STENOSIS)    Surgeons: Moni Liu MD Responsible Provider: Saeed Pandey MD    Anesthesia Type: general ASA Status: 4          Anesthesia Type: general    Marielena Phase I: Marielena Score: 9    Marielena Phase II:      Last vitals: Reviewed and per EMR flowsheets.        Anesthesia Post Evaluation    Patient location during evaluation: PACU  Patient participation: complete - patient participated  Level of consciousness: awake  Pain score: 0  Airway patency: patent  Nausea & Vomiting: no nausea  Complications: no  Cardiovascular status: hemodynamically stable  Respiratory status: acceptable  Hydration status: stable no

## 2022-04-17 NOTE — PROGRESS NOTE ADULT - PROBLEM SELECTOR PROBLEM 3
Problem: Behavioral Health Plan of Care  Goal: Patient-Specific Goal (Individualization)  Description: Pt. Will follow recommendations of treatment team during hospital stay.  Pt. Will sleep 6-8 hours per night during hospital stay.  Pt. Will attend > 50% of unit programing during hospital stay.   Pt. Will maintain ADLs without prompting during hospital stay.   Note: Report received from RUBEN Hicks.  Rounding complete.  Pt observed sleeping with regular and unlabored respirations.      Pt has been in bed with eyes closed and regular respirations.  15 minute and PRN checks all night.  No complaints offered.  Will continue to monitor.     Face to face end of shift communicated to oncoming RN.     Sussy MARTIN  April 17, 2022  6:10 AM       Problem: Psychotic Symptoms  Goal: Psychotic Symptoms  Description: Signs and symptoms of listed problems will be absent or manageable.  Outcome: Ongoing, Progressing   Goal Outcome Evaluation:                      
CAD (coronary artery disease)
Acute on chronic heart failure, unspecified heart failure type
Acute on chronic heart failure, unspecified heart failure type
Atrial fibrillation, unspecified type

## 2022-07-25 NOTE — PROGRESS NOTE ADULT - PROBLEM SELECTOR PROBLEM 7
HTN (hypertension) O-Z Flap Text: The defect edges were debeveled with a #15 scalpel blade.  Given the location of the defect, shape of the defect and the proximity to free margins an O-Z flap was deemed most appropriate.  Using a sterile surgical marker, an appropriate transposition flap was drawn incorporating the defect and placing the expected incisions within the relaxed skin tension lines where possible. The area thus outlined was incised deep to adipose tissue with a #15 scalpel blade.  The skin margins were undermined to an appropriate distance in all directions utilizing iris scissors.

## 2023-01-04 NOTE — PROGRESS NOTE ADULT - PROBLEM SELECTOR PROBLEM 10
Advised mom, who verbalized understanding and agreement. Appointment scheduled, aware at [facility].   Goals of care, counseling/discussion

## 2023-04-03 NOTE — OCCUPATIONAL THERAPY INITIAL EVALUATION ADULT - ASR WT BEARING STATUS EVAL
Outreach attempt was made to schedule a Medicare Wellness Visit. This was the first attempt. Contact was not made, no answer/busy.       Sent letter to patient asking her to call the office to schedule MWV.  
no weight-bearing restrictions

## 2023-07-07 NOTE — ED PROVIDER NOTE - SKIN LOCATION #1
Refill Decision Note   Angel Luis Mcelroy  is requesting a refill authorization.  Brief Assessment and Rationale for Refill:  Approve     Medication Therapy Plan:         Pharmacist review requested: Yes   Extended chart review required: Yes   Comments:     Note composed:8:52 AM 07/07/2023             Appointments     Last Visit   6/30/2022 Anya Sims MD   Next Visit   Visit date not found Anya Sims MD      
No care due was identified.  [facility] Embedded Care Due Messages. Reference number: [id number].   7/06/2023 7:02:16 PM CDT  
Refill Routing Note   Medication(s) are not appropriate for processing by [facility] for the following reason(s):      Drug-drug interaction    [facility] action(s):  Defer None identified   Medication Therapy Plan: spironolactone, hydroCHLOROthiazide    Pharmacist review requested: Yes     Appointments  past 12m or future 3m with PCP    Date Provider   Last Visit   6/30/2022 Anya Sims MD   Next Visit   Visit date not found Anya Sims MD   ED visits in past 90 days: 0        Note composed:1:37 AM 07/07/2023          
scalp

## 2023-10-24 NOTE — ED ADULT NURSE NOTE - CAS EDP DISCH DISPOSITION ADMI
[facility] Low Dose Naltrexone Pregnancy And Lactation Text: Naltrexone is pregnancy category C.  There have been no adequate and well-controlled studies in pregnant women.  It should be used in pregnancy only if the potential benefit justifies the potential risk to the fetus.   Limited data indicates that naltrexone is minimally excreted into breastmilk.

## 2024-11-14 NOTE — PATIENT PROFILE ADULT - LAST TOBACCO USE (DD-MM-YY)
Cardiac Catheterization Discharge Instructions    Site Care  Check puncture site frequently for swelling or bleeding. If there is any bleeding, lie down (if access is groin), hold firm pressure with a clean towel or wash cloth. If bleeding doesn't stop, call 911. Notify your doctor for any redness, swelling, drainage, or oozing from the puncture site.   If extremity becomes cold, numb or painful go to the emergency room.   You may remove the bandage from your Right, Groin in 24 hours. You may shower at that point. No hot tubs, bath tubs, or swimming for 1 week.   After shower, pat the incision dry and you can place a clean band-aid over the site.   No lotions, ointments, or powders over puncture site for 1 week.   Use a clean band-aid over the puncture site each day for 5 days. Change band-aid daily.   Cold pack or ice can be placed on the area for 10 to 20 minutes to help with the soreness of the site.     Activity  Activity should be limited for the next 48 hours. Climb as little stairs as possible and avoiding any bending, stooping, or strenuous activity for 48 hours. No heavy lifting (anything heavier than a gallon of milk) for 5 days.   You can walk around the house and do light activity such as cooking.   If procedure was done through the groin, avoid stairs for the first day or two  If the procedure was done through the wrist, do not bend your wrist deeply for the first couple of days. Be careful when getting up from sitting as to not use the affected wrist for the first 48 hours.     Diet  You may resume your usual diet. Drink more fluids than usual to flush kidneys. If you have kidney, heart or liver disease talk with your doctor on fluid limitations  Keep eating a heart healthy diet full of fruits, vegetables, and whole grains.     Medication  Your doctor will tell you when to resume your medications.  If you take blood thinners such as warfarin (Coumadin), rivaroxaban (Xarelto), or apixaban (Eliquis)  dangerous until the medication wears off.   Rest when you feel tired.  Drink plenty of fluids  No alcoholic beverages for 24 hours.   Eat a normal diet, unless given other instructions. If stomach is upset, try clear liquids and bland, low-fat foods like rice or toast.   If the doctor gave you a prescription for pain medication, take it as prescribed.   If you are not on any prescribed pain medications, ask your doctor if you can take an over-the-counter pain medications.    General Nurse Instructions:  Follow up care is a key part of treatment and safety. Be sure to make and go to all appointments, and call your doctor if you are having any problems. It's also a good idea to know your test results and keep a list of your medicines you take.  Call 911 if you experience any of the following symptoms:  You passed out  You have severe trouble breathing  You have sudden chest pain and shortness of breath or you cough up blood.  You have symptoms of a heart attack:   Chest pain, pressure, or a strange feeling in the chest  Sweating  Shortness of breath  Nausea or vomiting  Pain, pressure or a strange feeling in the back, neck, jaw, or upper belly or in one or both shoulders or arms  Lightheadedness or sudden weakness  A fast or irregular heart rate.   You have been diagnosed with angina or coronary artery disease, and you have symptoms that do not go away with rest or are not getting better within 5 minutes of nitroglycerin.   After you call 911, the  may tell you to chew 1 adult strength or 2-4 low dose aspirin. Wait for the ambulance do not drive yourself.   Bleeding from the area where the catheter was placed in your artery or vein  You have a fast-growing painful lump at the catheter site  General Instructions for a healthy lifestyle:  No smoking or tobacco products. Avoid any exposure to second hand smoke  Surgeon General's Warning: Quitting smoking now greatly reduces serious risk to your health  Obesity,  15-Feb-2004
